# Patient Record
Sex: FEMALE | Race: BLACK OR AFRICAN AMERICAN | Employment: OTHER | URBAN - METROPOLITAN AREA
[De-identification: names, ages, dates, MRNs, and addresses within clinical notes are randomized per-mention and may not be internally consistent; named-entity substitution may affect disease eponyms.]

---

## 2017-01-19 ENCOUNTER — OFFICE VISIT (OUTPATIENT)
Dept: INTERNAL MEDICINE CLINIC | Facility: CLINIC | Age: 52
End: 2017-01-19

## 2017-01-19 VITALS
DIASTOLIC BLOOD PRESSURE: 78 MMHG | HEART RATE: 84 BPM | HEIGHT: 58 IN | BODY MASS INDEX: 45.13 KG/M2 | SYSTOLIC BLOOD PRESSURE: 122 MMHG | WEIGHT: 215 LBS | RESPIRATION RATE: 16 BRPM

## 2017-01-19 DIAGNOSIS — I27.20 MILD PULMONARY HYPERTENSION (HCC): ICD-10-CM

## 2017-01-19 DIAGNOSIS — Z51.81 ENCOUNTER FOR THERAPEUTIC DRUG MONITORING: Primary | ICD-10-CM

## 2017-01-19 DIAGNOSIS — E88.81 INSULIN RESISTANCE: ICD-10-CM

## 2017-01-19 DIAGNOSIS — E66.01 OBESITY, CLASS III, BMI 40-49.9 (MORBID OBESITY) (HCC): ICD-10-CM

## 2017-01-19 PROBLEM — E55.9 VITAMIN D DEFICIENCY: Status: ACTIVE | Noted: 2017-01-19

## 2017-01-19 PROCEDURE — 99213 OFFICE O/P EST LOW 20 MIN: CPT | Performed by: INTERNAL MEDICINE

## 2017-01-19 RX ORDER — LACTOBACIL 2/BIFIDO 1/S.THERMO 450B CELL
PACKET (EA) ORAL
COMMUNITY

## 2017-01-19 RX ORDER — TOPIRAMATE 25 MG/1
25 TABLET ORAL 2 TIMES DAILY
Qty: 60 TABLET | Refills: 5 | Status: SHIPPED | OUTPATIENT
Start: 2017-01-19 | End: 2017-02-23

## 2017-01-19 NOTE — PROGRESS NOTES
CC: Patient presents with:  Weight Check: up 2 lb       HPI:   Obesity, been trying to watch her carbs, didn't see dietician. Stopped metformin due to nausea. Current Outpatient Prescriptions:  Probiotic Product (VSL#3) Oral Cap Take by mouth. III, BMI 40-49.9 (morbid obesity) ([facility] [address]     Encounter for therapeutic drug monitoring     Insulin resistance     Mild pulmonary hypertension (HCC)     Fatigue     Abnormal EKG        REVIEW OF SYSTEMS:   CARDIOVASCULAR: denies chest pain    EXAM:   /

## 2017-01-23 RX ORDER — TOPIRAMATE 25 MG/1
TABLET ORAL
Qty: 60 TABLET | Refills: 0 | OUTPATIENT
Start: 2017-01-23

## 2017-01-23 NOTE — TELEPHONE ENCOUNTER
Requesting topirimate  LOV: 1/19/17  RTC: 4 weeks  Last Labs: n/a  Filled: 1/19/17 #60 with 5 refills    Future Appointments  Date Time Provider Radha Quarles   1/26/2017 10:00 PM SCHEDULE BY DATE 81 Yisel Hicks   2/23/2017 2:45 PM Trish Morris

## 2017-01-26 ENCOUNTER — OFFICE VISIT (OUTPATIENT)
Dept: SLEEP CENTER | Facility: HOSPITAL | Age: 52
End: 2017-01-26
Attending: INTERNAL MEDICINE
Payer: MEDICARE

## 2017-01-26 PROCEDURE — 95810 POLYSOM 6/> YRS 4/> PARAM: CPT

## 2017-01-30 NOTE — PROCEDURES
[address] [address],[facility]       Accredited by the [facility] ([facility])    PATIENT'S NAME:        Missy Nguyễn  ATTENDING PHYSICIAN:   Annabelle Roberts M.D. REFERRING PHYSICIAN:   Jennifer Cruz M.D.   JENNIFER limited recording montage, no EEG abnormalities were detected. EKG: The EKG demonstrates sinus rhythm. IMPRESSION:  Sleep apnea is present; it is borderline. RECOMMENDATIONS:    1.    Clinical correlation is recommended to determine if symptoms ar

## 2017-01-31 ENCOUNTER — OFFICE VISIT (OUTPATIENT)
Dept: AUDIOLOGY | Facility: CLINIC | Age: 52
End: 2017-01-31

## 2017-01-31 ENCOUNTER — OFFICE VISIT (OUTPATIENT)
Dept: OTOLARYNGOLOGY | Facility: CLINIC | Age: 52
End: 2017-01-31

## 2017-01-31 VITALS
DIASTOLIC BLOOD PRESSURE: 68 MMHG | BODY MASS INDEX: 42.13 KG/M2 | HEIGHT: 59 IN | SYSTOLIC BLOOD PRESSURE: 102 MMHG | TEMPERATURE: 98 F | WEIGHT: 209 LBS

## 2017-01-31 DIAGNOSIS — H93.12 RINGING IN THE EAR, LEFT: Primary | ICD-10-CM

## 2017-01-31 DIAGNOSIS — H93.12 TINNITUS, LEFT: ICD-10-CM

## 2017-01-31 DIAGNOSIS — H92.02 OTALGIA, LEFT EAR: Primary | ICD-10-CM

## 2017-01-31 PROBLEM — H90.3 SENSORINEURAL HEARING LOSS, BILATERAL: Status: ACTIVE | Noted: 2017-01-31

## 2017-01-31 PROCEDURE — 92557 COMPREHENSIVE HEARING TEST: CPT | Performed by: AUDIOLOGIST

## 2017-01-31 PROCEDURE — 99214 OFFICE O/P EST MOD 30 MIN: CPT | Performed by: OTOLARYNGOLOGY

## 2017-01-31 PROCEDURE — 92567 TYMPANOMETRY: CPT | Performed by: AUDIOLOGIST

## 2017-01-31 PROCEDURE — G0463 HOSPITAL OUTPT CLINIC VISIT: HCPCS | Performed by: OTOLARYNGOLOGY

## 2017-01-31 RX ORDER — CLONAZEPAM 2 MG/1
TABLET ORAL
Refills: 0 | COMMUNITY
Start: 2016-12-15 | End: 2017-02-13

## 2017-01-31 RX ORDER — MELOXICAM 15 MG/1
15 TABLET ORAL DAILY
Qty: 30 TABLET | Refills: 3 | Status: SHIPPED | OUTPATIENT
Start: 2017-01-31 | End: 2018-05-09

## 2017-01-31 RX ORDER — MONTELUKAST SODIUM 10 MG/1
10 TABLET ORAL NIGHTLY
Qty: 30 TABLET | Refills: 3 | Status: SHIPPED | OUTPATIENT
Start: 2017-01-31 | End: 2017-05-15 | Stop reason: ALTCHOICE

## 2017-01-31 RX ORDER — LORATADINE 10 MG/1
10 TABLET ORAL DAILY
Qty: 30 TABLET | Refills: 3 | Status: SHIPPED | OUTPATIENT
Start: 2017-01-31 | End: 2018-05-09

## 2017-01-31 RX ORDER — FLUTICASONE PROPIONATE 50 MCG
1 SPRAY, SUSPENSION (ML) NASAL 2 TIMES DAILY
Qty: 1 BOTTLE | Refills: 3 | Status: SHIPPED | OUTPATIENT
Start: 2017-01-31 | End: 2018-05-09

## 2017-01-31 RX ORDER — FLUTICASONE PROPIONATE 50 MCG
SPRAY, SUSPENSION (ML) NASAL
Refills: 0 | OUTPATIENT
Start: 2017-01-31

## 2017-01-31 RX ORDER — PANTOPRAZOLE SODIUM 40 MG/1
TABLET, DELAYED RELEASE ORAL
Refills: 3 | COMMUNITY
Start: 2016-12-15

## 2017-01-31 NOTE — PROGRESS NOTES
Maliha Rosenbaum is a 46year old female.   Patient presents with:  Ringing In Ear: ringing in the left ear, left ear pain for 2 years  Nose Problem: nasal congestion, watery eyes, headache for 2 years      HISTORY OF PRESENT ILLNESS    She presents with a t [facility])      pre diabetic   • Dyspareunia    • Pap smear for cervical cancer screening 5/1/2014     wnl, neg hpv   • H/O mammogram 5/22/2014     normal           Past Surgical History    CHOLECYSTECTOMY  1997 [address] TM - Right: Normal, Left: Normal.   Skin Normal Inspection - Normal.        Lymph Detail Normal Submental. Submandibular. Anterior cervical. Posterior cervical. Supraclavicular. TMJ  Tender to palpation on the loop left.  Small amount of cerumen removed f PLAN    1. Ringing in the ear, left  We did review her previous audiogram with her audiogram performed today demonstrating no change in her hearing over 2 years. She continues to have the same left-sided hearing loss.  She does recall having a previous MRI

## 2017-01-31 NOTE — PROGRESS NOTES
AUDIOGRAM     Joss Wooten was referred for testing by Con Kemp due to left otalgia, pressure and tinnitus. 8/16/1965  [id number]    Pt noted occasional right tinnitus.     Otoscopic Inspection:  Right ear:  No cerumen  Left ear:  No cerumen

## 2017-02-02 ENCOUNTER — TELEPHONE (OUTPATIENT)
Dept: INTERNAL MEDICINE CLINIC | Facility: CLINIC | Age: 52
End: 2017-02-02

## 2017-02-03 NOTE — TELEPHONE ENCOUNTER
LM for patient to call back.  Dr. Timoteo Booth reviewed the sleep study and agreed it shows borderline sleep apnea and she should f/u with sleep specialist.

## 2017-02-13 PROBLEM — G47.33 OSA (OBSTRUCTIVE SLEEP APNEA): Status: ACTIVE | Noted: 2017-02-13

## 2017-02-13 PROBLEM — G47.00 INSOMNIA, UNSPECIFIED TYPE: Status: ACTIVE | Noted: 2017-02-13

## 2017-02-23 ENCOUNTER — OFFICE VISIT (OUTPATIENT)
Dept: INTERNAL MEDICINE CLINIC | Facility: CLINIC | Age: 52
End: 2017-02-23

## 2017-02-23 VITALS
SYSTOLIC BLOOD PRESSURE: 124 MMHG | WEIGHT: 215 LBS | DIASTOLIC BLOOD PRESSURE: 78 MMHG | RESPIRATION RATE: 16 BRPM | HEART RATE: 84 BPM | BODY MASS INDEX: 45.13 KG/M2 | HEIGHT: 58 IN

## 2017-02-23 DIAGNOSIS — E78.5 HYPERLIPIDEMIA, UNSPECIFIED HYPERLIPIDEMIA TYPE: ICD-10-CM

## 2017-02-23 DIAGNOSIS — G47.33 OSA (OBSTRUCTIVE SLEEP APNEA): ICD-10-CM

## 2017-02-23 DIAGNOSIS — E66.01 OBESITY, CLASS III, BMI 40-49.9 (MORBID OBESITY) (HCC): ICD-10-CM

## 2017-02-23 DIAGNOSIS — E55.9 VITAMIN D DEFICIENCY: ICD-10-CM

## 2017-02-23 DIAGNOSIS — E88.81 INSULIN RESISTANCE: ICD-10-CM

## 2017-02-23 DIAGNOSIS — Z51.81 ENCOUNTER FOR THERAPEUTIC DRUG MONITORING: Primary | ICD-10-CM

## 2017-02-23 PROCEDURE — 99214 OFFICE O/P EST MOD 30 MIN: CPT | Performed by: INTERNAL MEDICINE

## 2017-02-23 RX ORDER — TOPIRAMATE 50 MG/1
50 TABLET, FILM COATED ORAL 2 TIMES DAILY
Qty: 60 TABLET | Refills: 5 | Status: SHIPPED | OUTPATIENT
Start: 2017-02-23 | End: 2017-08-03

## 2017-02-23 NOTE — PROGRESS NOTES
CC: Patient presents with:  Weight Check: same weight       HPI:   1. Obesity, Class III, BMI 40-49.9 (morbid obesity) (HCC)/Insulin resistance/Mild pulmonary hypertension ([address] doing well on  topamax.     2. KYLE (obstructive sleep apnea), saw sleep Abnormal uterine bleeding 1/2015    • Anxiety state, unspecified    • Diabetes mellitus ([facility] [address]      pre diabetic   • Dyspareunia    • Pap smear for cervical cancer screening 5/1/2014     wnl, neg hpv   • H/O mammogram 5/22/2014     normal   • Obstructive sl daily.          None     ASSESSMENT:   Encounter for therapeutic drug monitoring  (primary encounter diagnosis)  Obesity, Class III, BMI 40-49.9 (morbid obesity) (HCC)  Insulin resistance  KYLE (obstructive sleep apnea)  Vitamin D deficiency  Hyperlipidemia

## 2017-03-03 ENCOUNTER — TELEPHONE (OUTPATIENT)
Dept: INTERNAL MEDICINE CLINIC | Facility: CLINIC | Age: 52
End: 2017-03-03

## 2017-03-03 NOTE — TELEPHONE ENCOUNTER
Prior authorization started on [facility] for farxiga.  Awaiting response

## 2017-03-13 ENCOUNTER — TELEPHONE (OUTPATIENT)
Dept: INTERNAL MEDICINE CLINIC | Facility: CLINIC | Age: 52
End: 2017-03-13

## 2017-03-13 NOTE — TELEPHONE ENCOUNTER
[facility] and was informed that Topamax 50mg does not require Prior Auth, the patient just requested the refill too soon, she is due for a refill on 03/23/17. Pharmacy states that they will contact the patient to inform her of this.      Pl

## 2017-03-13 NOTE — TELEPHONE ENCOUNTER
Tegan Hall MA at 3/10/2017 11:49 AM      Status: Signed : Tegan Hall MA (Medical Assistant)     Expand All Collapse All    Prior auth for Azucena Gee was denied by insurance. Patient needs to try and fail invokana, jardiance. Recommendations?

## 2017-03-13 NOTE — TELEPHONE ENCOUNTER
Informed pt the    Expand All Collapse All    Prior auth for farxiga was denied by insurance. Patient needs to try and fail invokana, jardiance. Recommendations? Please advise.

## 2017-03-20 NOTE — TELEPHONE ENCOUNTER
Patient returned call, informed of below, patient aware and verbalized understanding with intent to comply.

## 2017-03-21 ENCOUNTER — TELEPHONE (OUTPATIENT)
Dept: INTERNAL MEDICINE CLINIC | Facility: CLINIC | Age: 52
End: 2017-03-21

## 2017-03-29 ENCOUNTER — LAB ENCOUNTER (OUTPATIENT)
Dept: LAB | Facility: HOSPITAL | Age: 52
End: 2017-03-29
Attending: INTERNAL MEDICINE
Payer: MEDICARE

## 2017-03-29 DIAGNOSIS — E05.20 TOXIC MULTINODULAR GOITER: ICD-10-CM

## 2017-03-29 DIAGNOSIS — E05.90: ICD-10-CM

## 2017-03-29 DIAGNOSIS — E66.09 NON MORBID OBESITY DUE TO EXCESS CALORIES: Primary | ICD-10-CM

## 2017-03-29 LAB
ALBUMIN SERPL BCP-MCNC: 3.8 G/DL (ref 3.5–4.8)
ALBUMIN/GLOB SERPL: 1.2 {RATIO} (ref 1–2)
ALP SERPL-CCNC: 64 U/L (ref 32–100)
ALT SERPL-CCNC: 39 U/L (ref 14–54)
ANION GAP SERPL CALC-SCNC: 9 MMOL/L (ref 0–18)
AST SERPL-CCNC: 38 U/L (ref 15–41)
BILIRUB SERPL-MCNC: 0.5 MG/DL (ref 0.3–1.2)
BUN SERPL-MCNC: 8 MG/DL (ref 8–20)
BUN/CREAT SERPL: 13.6 (ref 10–20)
CALCIUM SERPL-MCNC: 9.4 MG/DL (ref 8.5–10.5)
CHLORIDE SERPL-SCNC: 101 MMOL/L (ref 95–110)
CO2 SERPL-SCNC: 29 MMOL/L (ref 22–32)
CREAT SERPL-MCNC: 0.59 MG/DL (ref 0.5–1.5)
GLOBULIN PLAS-MCNC: 3.3 G/DL (ref 2.5–3.7)
GLUCOSE SERPL-MCNC: 107 MG/DL (ref 70–99)
OSMOLALITY UR CALC.SUM OF ELEC: 287 MOSM/KG (ref 275–295)
POTASSIUM SERPL-SCNC: 3.8 MMOL/L (ref 3.3–5.1)
PROT SERPL-MCNC: 7.1 G/DL (ref 5.9–8.4)
SODIUM SERPL-SCNC: 139 MMOL/L (ref 136–144)
T3FREE SERPL-MCNC: 2.73 PG/ML (ref 2.53–4.29)
T4 FREE SERPL-MCNC: 0.72 NG/DL (ref 0.58–1.64)
TSH SERPL-ACNC: 0.67 UIU/ML (ref 0.34–5.6)

## 2017-03-29 PROCEDURE — 84443 ASSAY THYROID STIM HORMONE: CPT

## 2017-03-29 PROCEDURE — 84439 ASSAY OF FREE THYROXINE: CPT

## 2017-03-29 PROCEDURE — 84481 FREE ASSAY (FT-3): CPT

## 2017-03-29 PROCEDURE — 80053 COMPREHEN METABOLIC PANEL: CPT

## 2017-03-29 PROCEDURE — 36415 COLL VENOUS BLD VENIPUNCTURE: CPT

## 2017-04-05 ENCOUNTER — HOSPITAL ENCOUNTER (OUTPATIENT)
Dept: ULTRASOUND IMAGING | Age: 52
Discharge: HOME | End: 2017-04-05
Attending: INTERNAL MEDICINE
Payer: MEDICARE

## 2017-04-05 ENCOUNTER — APPOINTMENT (OUTPATIENT)
Dept: LAB | Age: 52
End: 2017-04-05
Attending: INTERNAL MEDICINE
Payer: MEDICARE

## 2017-04-05 DIAGNOSIS — E78.5 HYPERLIPIDEMIA, UNSPECIFIED HYPERLIPIDEMIA TYPE: ICD-10-CM

## 2017-04-05 DIAGNOSIS — Z51.81 ENCOUNTER FOR THERAPEUTIC DRUG MONITORING: ICD-10-CM

## 2017-04-05 DIAGNOSIS — E05.90 HYPERTHYROIDISM: ICD-10-CM

## 2017-04-05 DIAGNOSIS — E55.9 VITAMIN D DEFICIENCY: ICD-10-CM

## 2017-04-05 DIAGNOSIS — G47.33 OSA (OBSTRUCTIVE SLEEP APNEA): ICD-10-CM

## 2017-04-05 DIAGNOSIS — E88.81 INSULIN RESISTANCE: ICD-10-CM

## 2017-04-05 DIAGNOSIS — E66.01 OBESITY, CLASS III, BMI 40-49.9 (MORBID OBESITY) (HCC): ICD-10-CM

## 2017-04-05 PROCEDURE — 76536 US EXAM OF HEAD AND NECK: CPT

## 2017-04-05 PROCEDURE — 80061 LIPID PANEL: CPT

## 2017-04-05 PROCEDURE — 36415 COLL VENOUS BLD VENIPUNCTURE: CPT

## 2017-04-25 ENCOUNTER — OFFICE VISIT (OUTPATIENT)
Dept: INTERNAL MEDICINE CLINIC | Facility: CLINIC | Age: 52
End: 2017-04-25

## 2017-04-25 VITALS
RESPIRATION RATE: 16 BRPM | HEART RATE: 84 BPM | SYSTOLIC BLOOD PRESSURE: 124 MMHG | WEIGHT: 205 LBS | HEIGHT: 58 IN | BODY MASS INDEX: 43.03 KG/M2 | DIASTOLIC BLOOD PRESSURE: 80 MMHG

## 2017-04-25 DIAGNOSIS — E66.01 OBESITY, CLASS III, BMI 40-49.9 (MORBID OBESITY) (HCC): ICD-10-CM

## 2017-04-25 DIAGNOSIS — R73.01 IMPAIRED FASTING GLUCOSE: ICD-10-CM

## 2017-04-25 DIAGNOSIS — Z51.81 ENCOUNTER FOR THERAPEUTIC DRUG MONITORING: Primary | ICD-10-CM

## 2017-04-25 PROCEDURE — 99213 OFFICE O/P EST LOW 20 MIN: CPT | Performed by: INTERNAL MEDICINE

## 2017-04-25 NOTE — PROGRESS NOTES
CC: Patient presents with:  Weight Check: down 10 lb       HPI:   Obesity, doing well on topamax. No chest pain.        Current Outpatient Prescriptions:  Naltrexone-Bupropion HCl ER (CONTRAVE) 8-90 MG Oral Tablet 12 Hr Take 2 tablets by mouth 2 (two) t Patient Active Problem List:     Unspecified gastritis and gastroduodenitis without mention of hemorrhage     Migraine without aura, without mention of intractable migraine without mention of status migrainosus     Chest pain, unspecified     Impaired consider low dose phentermine in the future if needed.  Farxiga/jardiance not covered. Will start contrave, Pt consulted that I am a speaker for the drug and I speak to other doctors about it.    2. KYLE (obstructive sleep apnea), saw sleep doc, trying to u

## 2017-05-16 ENCOUNTER — HOSPITAL ENCOUNTER (OUTPATIENT)
Dept: ULTRASOUND IMAGING | Facility: HOSPITAL | Age: 52
Discharge: HOME | End: 2017-05-16
Attending: INTERNAL MEDICINE
Payer: COMMERCIAL

## 2017-05-22 ENCOUNTER — TELEPHONE (OUTPATIENT)
Dept: INTERNAL MEDICINE CLINIC | Facility: CLINIC | Age: 52
End: 2017-05-22

## 2017-05-23 ENCOUNTER — HOSPITAL ENCOUNTER (OUTPATIENT)
Dept: ULTRASOUND IMAGING | Facility: HOSPITAL | Age: 52
Discharge: HOME | End: 2017-05-23
Attending: INTERNAL MEDICINE
Payer: MEDICARE

## 2017-05-23 VITALS
HEART RATE: 82 BPM | OXYGEN SATURATION: 99 % | RESPIRATION RATE: 16 BRPM | DIASTOLIC BLOOD PRESSURE: 73 MMHG | SYSTOLIC BLOOD PRESSURE: 130 MMHG

## 2017-05-23 DIAGNOSIS — E05.20 TOXIC MULTINODUL GOITER: ICD-10-CM

## 2017-05-23 PROCEDURE — 88177 CYTP FNA EVAL EA ADDL: CPT | Performed by: INTERNAL MEDICINE

## 2017-05-23 PROCEDURE — 88172 CYTP DX EVAL FNA 1ST EA SITE: CPT | Performed by: INTERNAL MEDICINE

## 2017-05-23 PROCEDURE — 10022 US FNA THYROID SH(CPT=10022/76942): CPT | Performed by: INTERNAL MEDICINE

## 2017-05-23 PROCEDURE — 76942 ECHO GUIDE FOR BIOPSY: CPT | Performed by: INTERNAL MEDICINE

## 2017-05-23 PROCEDURE — 88173 CYTOPATH EVAL FNA REPORT: CPT | Performed by: INTERNAL MEDICINE

## 2017-05-23 NOTE — IMAGING NOTE
PT ARRIVED TO ROOM 3   SCANS BY Scottie Line TECH    HX TAKEN:   DM TYPE 2, MIGRAINES, MILD HEARING LOSS, RIGHT THYROID NODULE, ANXIETY,GOITER, PMILD PUMONARY HTN,HYPERLIPIDEMIA      PROCEDURE EXPLAINED QUESTIONS ANSWERED    PT CONSENTED AT 1055

## 2017-06-20 ENCOUNTER — OFFICE VISIT (OUTPATIENT)
Dept: INTERNAL MEDICINE CLINIC | Facility: CLINIC | Age: 52
End: 2017-06-20

## 2017-06-20 VITALS
WEIGHT: 210 LBS | BODY MASS INDEX: 44.08 KG/M2 | HEART RATE: 84 BPM | SYSTOLIC BLOOD PRESSURE: 124 MMHG | DIASTOLIC BLOOD PRESSURE: 80 MMHG | HEIGHT: 58 IN | RESPIRATION RATE: 16 BRPM

## 2017-06-20 DIAGNOSIS — E88.81 INSULIN RESISTANCE: ICD-10-CM

## 2017-06-20 DIAGNOSIS — Z51.81 ENCOUNTER FOR THERAPEUTIC DRUG MONITORING: Primary | ICD-10-CM

## 2017-06-20 DIAGNOSIS — E66.01 OBESITY, CLASS III, BMI 40-49.9 (MORBID OBESITY) (HCC): ICD-10-CM

## 2017-06-20 DIAGNOSIS — G47.33 OSA (OBSTRUCTIVE SLEEP APNEA): ICD-10-CM

## 2017-06-20 PROCEDURE — 99213 OFFICE O/P EST LOW 20 MIN: CPT | Performed by: INTERNAL MEDICINE

## 2017-06-20 RX ORDER — METFORMIN HYDROCHLORIDE 750 MG/1
750 TABLET, EXTENDED RELEASE ORAL
Qty: 30 TABLET | Refills: 5 | Status: SHIPPED | OUTPATIENT
Start: 2017-06-20 | End: 2017-08-03

## 2017-06-20 NOTE — PROGRESS NOTES
CC: Patient presents with:  Weight Check: up 5 lb       HPI:   Obesity, doing well on topamax, increased stress, forgets to take it twice daily.        Current Outpatient Prescriptions:  MetFORMIN HCl  MG Oral Tablet 24 Hr Take 1 tablet (750 mg to apnea (adult) (pediatric) Edward PSG 1-26-17     AHI 5.3 SaO2 cole 81 % autoPAP 6-16 HME      Patient Active Problem List:     Unspecified gastritis and gastroduodenitis without mention of hemorrhage     Migraine without aura, without mention of intractab topamax. Stopped metformin due to nausea, willing to try again, will give metformin XR daily. Labs show prediabetes and high leptin. On topamax 50mg bid, forgets to take bid, will change to trokendi 100mg XR daily.  Echo back in 2015 showed mild pulm HTN, r

## 2017-07-18 ENCOUNTER — TELEPHONE (OUTPATIENT)
Dept: INTERNAL MEDICINE CLINIC | Facility: CLINIC | Age: 52
End: 2017-07-18

## 2017-07-18 NOTE — TELEPHONE ENCOUNTER
Name of Medication:  Ergocalciferol (Cap) DRISDOL/VITAMIN D2 31520 units Oral Take 1 capsule (50,000 Units total) by mouth once a week. For 6 months         Dose:     How is medication prescribed:    Specific name of pharmacy and location:    Name of ma

## 2017-07-19 NOTE — TELEPHONE ENCOUNTER
Requesting Ergocalciferol  LOV: 6/20/17  RTC: 4 weeks  Last Labs: 11/2016  For 6 months  Filled: 11/30/16 #24 with 0 refills    Future Appointments  Date Time Provider Radha Quarles   8/22/2017 2:00 PM Ace Bedoya MD [facility]   9/18/2017 2

## 2017-07-20 RX ORDER — ERGOCALCIFEROL 1.25 MG/1
CAPSULE ORAL
Qty: 24 CAPSULE | Refills: 0 | OUTPATIENT
Start: 2017-07-20

## 2017-08-03 ENCOUNTER — OFFICE VISIT (OUTPATIENT)
Dept: INTERNAL MEDICINE CLINIC | Facility: CLINIC | Age: 52
End: 2017-08-03

## 2017-08-03 VITALS
HEART RATE: 84 BPM | SYSTOLIC BLOOD PRESSURE: 122 MMHG | DIASTOLIC BLOOD PRESSURE: 78 MMHG | WEIGHT: 202 LBS | RESPIRATION RATE: 16 BRPM | HEIGHT: 58 IN | BODY MASS INDEX: 42.4 KG/M2

## 2017-08-03 DIAGNOSIS — G47.33 OSA (OBSTRUCTIVE SLEEP APNEA): ICD-10-CM

## 2017-08-03 DIAGNOSIS — E88.81 INSULIN RESISTANCE: ICD-10-CM

## 2017-08-03 DIAGNOSIS — E66.01 OBESITY, CLASS III, BMI 40-49.9 (MORBID OBESITY) (HCC): ICD-10-CM

## 2017-08-03 DIAGNOSIS — Z51.81 ENCOUNTER FOR THERAPEUTIC DRUG MONITORING: Primary | ICD-10-CM

## 2017-08-03 PROCEDURE — 99213 OFFICE O/P EST LOW 20 MIN: CPT | Performed by: INTERNAL MEDICINE

## 2017-08-03 RX ORDER — PHENTERMINE HYDROCHLORIDE 15 MG/1
15 CAPSULE ORAL EVERY MORNING
Qty: 30 CAPSULE | Refills: 0 | Status: SHIPPED | OUTPATIENT
Start: 2017-08-03 | End: 2018-05-09

## 2017-08-03 NOTE — PROGRESS NOTES
CC: Patient presents with:  Weight Check: down 8 lb       HPI:   Obesity, metformin and trokendi caused dizziness, she stopped. No chest pain.        Current Outpatient Prescriptions:  Phentermine HCl 15 MG Oral Cap Take 1 capsule (15 mg total) by rodrigo Mild hearing loss of left ear     Mild hearing loss of right ear     Hypercholesteremia     Right thyroid nodule     Diabetes type 2, controlled (HCC)     Anxiety about health     Goiter, euthyroid     Positive ABEBE (antinuclear antibody)     Obesity, Class plan.  Return in about 4 weeks (around 8/31/2017).

## 2017-09-20 ENCOUNTER — TELEPHONE (OUTPATIENT)
Dept: FAMILY MEDICINE CLINIC | Facility: CLINIC | Age: 52
End: 2017-09-20

## 2017-09-20 NOTE — TELEPHONE ENCOUNTER
Spoke to pt and per pt she will cb and schedule the AWV, this is not a good time for pt to schedule one. Pt will cb.

## 2017-10-25 ENCOUNTER — TELEPHONE (OUTPATIENT)
Dept: FAMILY MEDICINE CLINIC | Facility: CLINIC | Age: 52
End: 2017-10-25

## 2017-11-20 ENCOUNTER — TELEPHONE (OUTPATIENT)
Dept: FAMILY MEDICINE CLINIC | Facility: CLINIC | Age: 52
End: 2017-11-20

## 2017-12-17 ENCOUNTER — HOSPITAL ENCOUNTER (OUTPATIENT)
Age: 52
Discharge: HOME | End: 2017-12-17
Payer: MEDICARE

## 2017-12-17 VITALS
HEART RATE: 80 BPM | SYSTOLIC BLOOD PRESSURE: 126 MMHG | TEMPERATURE: 98 F | OXYGEN SATURATION: 99 % | DIASTOLIC BLOOD PRESSURE: 75 MMHG | RESPIRATION RATE: 20 BRPM

## 2017-12-17 DIAGNOSIS — J06.9 VIRAL UPPER RESPIRATORY TRACT INFECTION WITH COUGH: Primary | ICD-10-CM

## 2017-12-17 DIAGNOSIS — J02.9 VIRAL PHARYNGITIS: ICD-10-CM

## 2017-12-17 PROCEDURE — 87081 CULTURE SCREEN ONLY: CPT | Performed by: NURSE PRACTITIONER

## 2017-12-17 PROCEDURE — 99204 OFFICE O/P NEW MOD 45 MIN: CPT

## 2017-12-17 PROCEDURE — 99214 OFFICE O/P EST MOD 30 MIN: CPT

## 2017-12-17 PROCEDURE — 87430 STREP A AG IA: CPT | Performed by: NURSE PRACTITIONER

## 2017-12-17 RX ORDER — FLUTICASONE PROPIONATE 50 MCG
1 SPRAY, SUSPENSION (ML) NASAL 2 TIMES DAILY PRN
Qty: 16 G | Refills: 0 | Status: SHIPPED | OUTPATIENT
Start: 2017-12-17 | End: 2017-12-17

## 2017-12-17 RX ORDER — METHYLPREDNISOLONE 4 MG/1
TABLET ORAL
Qty: 1 PACKAGE | Refills: 0 | Status: SHIPPED | OUTPATIENT
Start: 2017-12-17 | End: 2018-05-09

## 2017-12-17 NOTE — ED PROVIDER NOTES
Patient Seen in: [address] [facility]    History   Patient presents with:  Cough/URI  Sore Throat    Stated Complaint: cold,sore throat,nasal congestion    30-year-old female presents today with complaints of URI symptoms sore throat and a dry throat,nasal congestion  Other systems are as noted in HPI. Constitutional and vital signs reviewed. All other systems reviewed and negative except as noted above.     Physical Exam   ED Triage Vitals [12/17/17 1044]  BP: 126/75  Pulse: 80  Resp: 20 diagnosis)  Viral pharyngitis    Disposition:  Discharge  12/17/2017 11:08 am    Follow-up:  Jessica Valderrama MD  [facility] [address] [address]  [phone]    In 1 week  As needed        Medications Prescribed:  Current Discharge Medication L

## 2018-03-16 ENCOUNTER — PATIENT OUTREACH (OUTPATIENT)
Dept: FAMILY MEDICINE CLINIC | Facility: CLINIC | Age: 53
End: 2018-03-16

## 2018-03-16 NOTE — PROGRESS NOTES
Called and left patient a message regarding AWV. Also asked her to let us know is Veda Diane is still her primary.   Looks like she goes to another [facility] office  Dr. Nissa Israel

## 2018-04-13 ENCOUNTER — PATIENT OUTREACH (OUTPATIENT)
Dept: FAMILY MEDICINE CLINIC | Facility: CLINIC | Age: 53
End: 2018-04-13

## 2018-05-01 ENCOUNTER — PATIENT OUTREACH (OUTPATIENT)
Dept: FAMILY MEDICINE CLINIC | Facility: CLINIC | Age: 53
End: 2018-05-01

## 2018-05-09 ENCOUNTER — OFFICE VISIT (OUTPATIENT)
Dept: INTERNAL MEDICINE CLINIC | Facility: CLINIC | Age: 53
End: 2018-05-09

## 2018-05-09 VITALS
BODY MASS INDEX: 42.17 KG/M2 | RESPIRATION RATE: 20 BRPM | HEART RATE: 76 BPM | WEIGHT: 200.88 LBS | HEIGHT: 58 IN | DIASTOLIC BLOOD PRESSURE: 80 MMHG | SYSTOLIC BLOOD PRESSURE: 124 MMHG

## 2018-05-09 DIAGNOSIS — E78.5 HYPERLIPIDEMIA, UNSPECIFIED HYPERLIPIDEMIA TYPE: ICD-10-CM

## 2018-05-09 DIAGNOSIS — G47.33 OSA (OBSTRUCTIVE SLEEP APNEA): ICD-10-CM

## 2018-05-09 DIAGNOSIS — E66.01 OBESITY, CLASS III, BMI 40-49.9 (MORBID OBESITY) (HCC): ICD-10-CM

## 2018-05-09 DIAGNOSIS — Z51.81 ENCOUNTER FOR THERAPEUTIC DRUG MONITORING: Primary | ICD-10-CM

## 2018-05-09 PROCEDURE — 99214 OFFICE O/P EST MOD 30 MIN: CPT | Performed by: INTERNAL MEDICINE

## 2018-05-09 NOTE — PROGRESS NOTES
HISTORY OF PRESENT ILLNESS  Patient presents with:  Weight Problem: Previous patient of Moses Alvarado is a 46year old female here for follow up in medical weight loss program.     Last saw Dr. Edie Guzman in 7/17.    Did lose and then gained the w 03/29/2017   GFRAA >60 03/29/2017   CA 9.4 03/29/2017   OSMOCALC 287 03/29/2017   ALKPHO 64 03/29/2017   AST 38 03/29/2017   ALT 39 03/29/2017   BILT 0.5 03/29/2017   TP 7.1 03/29/2017   ALB 3.8 03/29/2017   GLOBULIN 3.3 03/29/2017    03/29/2017   K HEMOGLOBIN A1C; Future  -     LIPID PANEL;  Future  -     TSH+FREE T4; Future  -     VITAMIN B12; Future  -     VITAMIN D, 25-HYDROXY; Future  -     OP REFERRAL TO DIETITIAN EMG WLC (WLC USE ONLY)    Hyperlipidemia, unspecified hyperlipidemia type  -     CO soda/juice consumption if soda/juice drinker  2. Eat breakfast daily (within 1 hour) 20 gram of protein   3. Focus on protein: (15-30 grams with each meal) ie. greek yogurt, cottage cheese, string cheese, hard boiled eggs  4.  Healthy snacks: peanut butter

## 2018-05-09 NOTE — PATIENT INSTRUCTIONS
Plan:  Continue with medications:   Go to the lab for blood work   Follow up with me in 1 month  Schedule follow up appointments: Melissa Mallory (dietitian) & Valeri Wilson (behavorial psychologist)   Adrian Leary with Lita Bassett    Please try to work on the following [facility]

## 2018-05-10 ENCOUNTER — TELEPHONE (OUTPATIENT)
Dept: INTERNAL MEDICINE CLINIC | Facility: CLINIC | Age: 53
End: 2018-05-10

## 2018-05-10 NOTE — TELEPHONE ENCOUNTER
I can send in a prescription for the pen needles. Did you want patient to stay on the 0.6mg dose of victoza until her f/u visit with you on 6/7/18 or should she titrate up as tolerated?

## 2018-05-10 NOTE — TELEPHONE ENCOUNTER
Patient was seen yesterday by Dr. Yo Shafer and was prescribed Victoza and the needles were never sent over to the pharmacy.  Patient also stated that the prescription was sent over for 90 days and needs to be changed to a 30 day supply in order for it to be  Co

## 2018-05-29 ENCOUNTER — APPOINTMENT (OUTPATIENT)
Dept: LAB | Age: 53
End: 2018-05-29
Attending: INTERNAL MEDICINE
Payer: MEDICARE

## 2018-05-29 DIAGNOSIS — E78.5 HYPERLIPIDEMIA, UNSPECIFIED HYPERLIPIDEMIA TYPE: ICD-10-CM

## 2018-05-29 DIAGNOSIS — E66.01 OBESITY, CLASS III, BMI 40-49.9 (MORBID OBESITY) (HCC): ICD-10-CM

## 2018-05-29 DIAGNOSIS — G47.33 OSA (OBSTRUCTIVE SLEEP APNEA): ICD-10-CM

## 2018-05-29 PROCEDURE — 36415 COLL VENOUS BLD VENIPUNCTURE: CPT

## 2018-05-29 PROCEDURE — 84443 ASSAY THYROID STIM HORMONE: CPT

## 2018-05-29 PROCEDURE — 80061 LIPID PANEL: CPT

## 2018-05-29 PROCEDURE — 82607 VITAMIN B-12: CPT

## 2018-05-29 PROCEDURE — 82306 VITAMIN D 25 HYDROXY: CPT

## 2018-05-29 PROCEDURE — 80053 COMPREHEN METABOLIC PANEL: CPT

## 2018-05-29 PROCEDURE — 83036 HEMOGLOBIN GLYCOSYLATED A1C: CPT

## 2018-05-29 PROCEDURE — 84439 ASSAY OF FREE THYROXINE: CPT

## 2018-05-29 PROCEDURE — 85025 COMPLETE CBC W/AUTO DIFF WBC: CPT

## 2018-06-07 ENCOUNTER — OFFICE VISIT (OUTPATIENT)
Dept: INTERNAL MEDICINE CLINIC | Facility: CLINIC | Age: 53
End: 2018-06-07

## 2018-06-07 ENCOUNTER — TELEPHONE (OUTPATIENT)
Dept: INTERNAL MEDICINE CLINIC | Facility: CLINIC | Age: 53
End: 2018-06-07

## 2018-06-07 VITALS
BODY MASS INDEX: 43.03 KG/M2 | SYSTOLIC BLOOD PRESSURE: 122 MMHG | HEART RATE: 84 BPM | RESPIRATION RATE: 16 BRPM | WEIGHT: 205 LBS | HEIGHT: 58 IN | DIASTOLIC BLOOD PRESSURE: 80 MMHG

## 2018-06-07 DIAGNOSIS — E88.81 INSULIN RESISTANCE: ICD-10-CM

## 2018-06-07 DIAGNOSIS — Z51.81 ENCOUNTER FOR THERAPEUTIC DRUG MONITORING: Primary | ICD-10-CM

## 2018-06-07 DIAGNOSIS — E55.9 VITAMIN D DEFICIENCY: ICD-10-CM

## 2018-06-07 DIAGNOSIS — E66.01 OBESITY, CLASS III, BMI 40-49.9 (MORBID OBESITY) (HCC): ICD-10-CM

## 2018-06-07 DIAGNOSIS — E78.00 PURE HYPERCHOLESTEROLEMIA: ICD-10-CM

## 2018-06-07 DIAGNOSIS — E11.9 CONTROLLED TYPE 2 DIABETES MELLITUS WITHOUT COMPLICATION, WITHOUT LONG-TERM CURRENT USE OF INSULIN (HCC): ICD-10-CM

## 2018-06-07 PROCEDURE — 99214 OFFICE O/P EST MOD 30 MIN: CPT | Performed by: INTERNAL MEDICINE

## 2018-06-07 RX ORDER — METFORMIN HYDROCHLORIDE 750 MG/1
750 TABLET, EXTENDED RELEASE ORAL 2 TIMES DAILY WITH MEALS
Qty: 60 TABLET | Refills: 2 | Status: SHIPPED | OUTPATIENT
Start: 2018-06-07 | End: 2018-08-07

## 2018-06-07 RX ORDER — ERGOCALCIFEROL 1.25 MG/1
50000 CAPSULE ORAL WEEKLY
Qty: 4 CAPSULE | Refills: 2 | Status: SHIPPED | OUTPATIENT
Start: 2018-06-07 | End: 2018-07-07

## 2018-06-07 NOTE — PROGRESS NOTES
HISTORY OF PRESENT ILLNESS  Patient presents with:  Weight Check: up 5 lb      Charity Varela is a 46year old female here for follow up in medical weight loss program.     Has been eating cottage cheese and grape fruit.    Has been walking 1-2/ 3 days of  05/29/2018   CO2 29.0 05/29/2018       Lab Results  Component Value Date    (H) 05/29/2018   A1C 6.2 (H) 05/29/2018       Lab Results  Component Value Date   CHOLEST 216 (H) 05/29/2018   TRIG 88 05/29/2018   HDL 64 05/29/2018    (H) side effects and adverse effects of this medication  · Worsening cholesterol, advised needs to continue to change eating and work on weight loss   · Continue victoza 1.8 mg q day   · Needs to start tracking her daily calorie intake.    · Nutrition: low carb

## 2018-06-07 NOTE — TELEPHONE ENCOUNTER
Aetammy called from the prior authorization dept   Would like to discuss/request additional clinical information on rx jardiance also would like to know names of meds pt has tried before       # [phone]

## 2018-06-07 NOTE — TELEPHONE ENCOUNTER
Insurance is not covering Vit D 50,000 Units Dr Nayeli Alvarez recommends to take Vit D OTC 5,000 Units daily    Insurance is not covering Etoformin ER Dr Nayeli Alvarez wants to change to metformin 500 mg BID, free at [place].

## 2018-06-07 NOTE — TELEPHONE ENCOUNTER
Cash price for Vitamin D at [employer] is $11.  Metformin about $30 and Victoza about $40.

## 2018-06-08 NOTE — TELEPHONE ENCOUNTER
Spoke with patient who states that jardiance is the medication that Dr. Sandhya Agudelo really wants patient to be on but does think that her insurance will cover it so metformin was prescribed instead.     Patient called her insurance to have them fax us paperwork to

## 2018-06-08 NOTE — TELEPHONE ENCOUNTER
Spoke with patient and informed her of the cash prices of the medications that Brenda De Leon called on. Patient states that she will go to [place] and have it figured out. I advised her to call us back if there were any issues.

## 2018-06-12 ENCOUNTER — TELEPHONE (OUTPATIENT)
Dept: INTERNAL MEDICINE CLINIC | Facility: CLINIC | Age: 53
End: 2018-06-12

## 2018-06-12 NOTE — TELEPHONE ENCOUNTER
MD Konrad Linda, RN   18 minutes ago (1:17 PM)      Lets take metformin at dinner every other day until she can tolerate it  (Routing comment)      Patient aware of Dr. Olamide Thompson recommendations. Voiced understanding and agreement.

## 2018-06-12 NOTE — TELEPHONE ENCOUNTER
Miranda:pt called to speak to a nurse states has been feeling extremely sluggis,no energy, tired, struggles to get out of bed and is currently on metformin and Minnie Boyle wondering if those are symptoms and how long will she be that way or does it take time to g

## 2018-06-12 NOTE — TELEPHONE ENCOUNTER
[facility] fax from [place] that [place] medication will be covered from 12/30/17-12/31/18. Referral #  [id number]  Member ID# [id number]  Customer Service # [phone]. Please advise if you would like to change patient from metformin to Jardiance.  Pérez

## 2018-06-12 NOTE — TELEPHONE ENCOUNTER
Paperwork signed by Provider and faxed back to Mary Ann at number below. Confirmation rcvd. Sent to scan and copy in blue bin.

## 2018-06-12 NOTE — TELEPHONE ENCOUNTER
Has been taking metformin ER 750mg 1QAM for 5 days and has been feeling very tired and sluggish since starting it. Her insurance did approve jardiance if you wanted to change her to that    She is also on victoza 1.8mg for 3 weeks now.       LOV: 6/7/18

## 2018-06-21 ENCOUNTER — CHARTING TRANS (OUTPATIENT)
Dept: OTHER | Age: 53
End: 2018-06-21

## 2018-07-23 ENCOUNTER — CHARTING TRANS (OUTPATIENT)
Dept: OTHER | Age: 53
End: 2018-07-23

## 2018-08-07 ENCOUNTER — OFFICE VISIT (OUTPATIENT)
Dept: INTERNAL MEDICINE CLINIC | Facility: CLINIC | Age: 53
End: 2018-08-07
Payer: COMMERCIAL

## 2018-08-07 VITALS
HEIGHT: 58 IN | SYSTOLIC BLOOD PRESSURE: 124 MMHG | RESPIRATION RATE: 16 BRPM | HEART RATE: 76 BPM | BODY MASS INDEX: 43.24 KG/M2 | DIASTOLIC BLOOD PRESSURE: 80 MMHG | WEIGHT: 206 LBS

## 2018-08-07 DIAGNOSIS — I27.20 MILD PULMONARY HYPERTENSION (HCC): ICD-10-CM

## 2018-08-07 DIAGNOSIS — E66.01 OBESITY, CLASS III, BMI 40-49.9 (MORBID OBESITY) (HCC): ICD-10-CM

## 2018-08-07 DIAGNOSIS — Z51.81 ENCOUNTER FOR THERAPEUTIC DRUG MONITORING: Primary | ICD-10-CM

## 2018-08-07 PROCEDURE — 99214 OFFICE O/P EST MOD 30 MIN: CPT | Performed by: INTERNAL MEDICINE

## 2018-08-07 RX ORDER — LORAZEPAM 2 MG/1
TABLET ORAL
COMMUNITY
End: 2018-09-11

## 2018-08-07 RX ORDER — HYDROCHLOROTHIAZIDE 25 MG/1
TABLET ORAL
Refills: 2 | COMMUNITY
Start: 2018-06-16 | End: 2018-09-11

## 2018-08-07 RX ORDER — ERGOCALCIFEROL 1.25 MG/1
CAPSULE ORAL
Refills: 2 | COMMUNITY
Start: 2018-07-10 | End: 2018-10-11

## 2018-08-07 RX ORDER — CLONAZEPAM 1 MG/1
1 TABLET ORAL
COMMUNITY
Start: 2018-07-10 | End: 2018-08-07

## 2018-08-07 RX ORDER — TRIAMTERENE AND HYDROCHLOROTHIAZIDE 37.5; 25 MG/1; MG/1
1 TABLET ORAL DAILY
COMMUNITY
Start: 2018-05-15

## 2018-08-07 RX ORDER — MOMETASONE FUROATE 1 MG/G
CREAM TOPICAL
COMMUNITY
Start: 2018-07-23 | End: 2018-09-11

## 2018-08-08 NOTE — PROGRESS NOTES
HISTORY OF PRESENT ILLNESS  Patient presents with:  Weight Check: up 1 lb      Terese Urbina is a 46year old female here for follow up in medical weight loss program.     Up 1 lb from previous.    Did not tolerate medication with victoza/ metformin felt 05/29/2018    05/29/2018   CO2 29.0 05/29/2018       Lab Results  Component Value Date    (H) 05/29/2018   A1C 6.2 (H) 05/29/2018       Lab Results  Component Value Date   CHOLEST 216 (H) 05/29/2018   TRIG 88 05/29/2018   HDL 64 05/29/2018   L jardiance  · -advised of side effects and adverse effects of this medication  · We reviewed the limitations of medication   · Advised of side effects and adverse effects of this medication.    · Worsening cholesterol, advised needs to continue to change eat

## 2018-09-28 ENCOUNTER — APPOINTMENT (OUTPATIENT)
Dept: CT IMAGING | Age: 53
End: 2018-09-28
Attending: EMERGENCY MEDICINE
Payer: MEDICARE

## 2018-09-28 ENCOUNTER — HOSPITAL ENCOUNTER (EMERGENCY)
Age: 53
Discharge: HOME | End: 2018-09-28
Attending: EMERGENCY MEDICINE
Payer: MEDICARE

## 2018-09-28 VITALS
BODY MASS INDEX: 40.32 KG/M2 | WEIGHT: 200 LBS | HEART RATE: 75 BPM | DIASTOLIC BLOOD PRESSURE: 57 MMHG | TEMPERATURE: 98 F | SYSTOLIC BLOOD PRESSURE: 114 MMHG | OXYGEN SATURATION: 99 % | RESPIRATION RATE: 16 BRPM | HEIGHT: 59 IN

## 2018-09-28 DIAGNOSIS — M51.37 DEGENERATIVE DISC DISEASE AT L5-S1 LEVEL: ICD-10-CM

## 2018-09-28 DIAGNOSIS — R10.12 ABDOMINAL PAIN, LEFT UPPER QUADRANT: Primary | ICD-10-CM

## 2018-09-28 DIAGNOSIS — D25.9 UTERINE LEIOMYOMA, UNSPECIFIED LOCATION: ICD-10-CM

## 2018-09-28 PROCEDURE — 74177 CT ABD & PELVIS W/CONTRAST: CPT | Performed by: EMERGENCY MEDICINE

## 2018-09-28 PROCEDURE — 99284 EMERGENCY DEPT VISIT MOD MDM: CPT

## 2018-09-28 PROCEDURE — 96361 HYDRATE IV INFUSION ADD-ON: CPT

## 2018-09-28 PROCEDURE — 96374 THER/PROPH/DIAG INJ IV PUSH: CPT

## 2018-09-28 PROCEDURE — 85025 COMPLETE CBC W/AUTO DIFF WBC: CPT | Performed by: EMERGENCY MEDICINE

## 2018-09-28 PROCEDURE — 96375 TX/PRO/DX INJ NEW DRUG ADDON: CPT

## 2018-09-28 PROCEDURE — 80053 COMPREHEN METABOLIC PANEL: CPT | Performed by: EMERGENCY MEDICINE

## 2018-09-28 PROCEDURE — 81003 URINALYSIS AUTO W/O SCOPE: CPT | Performed by: EMERGENCY MEDICINE

## 2018-09-28 PROCEDURE — 83690 ASSAY OF LIPASE: CPT | Performed by: EMERGENCY MEDICINE

## 2018-09-28 RX ORDER — KETOROLAC TROMETHAMINE 30 MG/ML
30 INJECTION, SOLUTION INTRAMUSCULAR; INTRAVENOUS ONCE
Status: COMPLETED | OUTPATIENT
Start: 2018-09-28 | End: 2018-09-28

## 2018-09-28 RX ORDER — ONDANSETRON 2 MG/ML
4 INJECTION INTRAMUSCULAR; INTRAVENOUS ONCE
Status: COMPLETED | OUTPATIENT
Start: 2018-09-28 | End: 2018-09-28

## 2018-09-28 RX ORDER — FAMOTIDINE 20 MG/1
20 TABLET ORAL 2 TIMES DAILY PRN
Qty: 30 TABLET | Refills: 0 | Status: SHIPPED | OUTPATIENT
Start: 2018-09-28 | End: 2018-10-11

## 2018-09-28 NOTE — ED PROVIDER NOTES
Patient Seen in: THE [facility] [place]    History   Patient presents with:  Abdomen/Flank Pain (GI/)    Stated Complaint: abd pain radiated around left side    HPI    The patient is a 51-year-old female with a history of chronic low OOPHORECTOMY  3/2015: OTHER SURGICAL HISTORY      Comment:  benign  2000: TUBAL LIGATION        Social History    Tobacco Use      Smoking status: Never Smoker      Smokeless tobacco: Never Used    Alcohol use: No      Alcohol/week: 0.0 oz    Drug use:  No Course     Labs Reviewed   COMP METABOLIC PANEL (14) - Abnormal; Notable for the following components:       Result Value    AST 14 (*)     Total Protein 8.4 (*)     Globulin  4.6 (*)     A/G Ratio 0.8 (*)     All other components within normal limits   LI with her PCP. She states that she already was started on pantoprazole by her PCP but only 2 days ago. I believe it would be reasonable to start an H2 blocker in addition to her PPI. So I also discussed with her dietary modification.   She states that she

## 2018-10-30 PROBLEM — K31.7 POLYP OF STOMACH AND DUODENUM: Status: ACTIVE | Noted: 2018-10-30

## 2018-10-30 PROBLEM — R10.12 LEFT UPPER QUADRANT PAIN: Status: ACTIVE | Noted: 2018-10-30

## 2018-10-30 PROBLEM — R14.1 GAS PAIN: Status: ACTIVE | Noted: 2018-10-30

## 2018-11-23 ENCOUNTER — IMAGING SERVICES (OUTPATIENT)
Dept: OTHER | Age: 53
End: 2018-11-23

## 2019-01-22 ENCOUNTER — OFFICE VISIT (OUTPATIENT)
Dept: PODIATRY | Age: 54
End: 2019-01-22

## 2019-01-22 DIAGNOSIS — M76.821 POSTERIOR TIBIAL TENDINITIS OF RIGHT LOWER EXTREMITY: Primary | ICD-10-CM

## 2019-01-22 DIAGNOSIS — S93.691A: ICD-10-CM

## 2019-01-22 DIAGNOSIS — M21.40 PES PLANUS, UNSPECIFIED LATERALITY: ICD-10-CM

## 2019-01-22 DIAGNOSIS — S86.111A TRAUMATIC RUPTURE OF RIGHT POSTERIOR TIBIAL TENDON, INITIAL ENCOUNTER: ICD-10-CM

## 2019-01-22 PROCEDURE — L4360 PNEUMAT WALKING BOOT PRE CST: HCPCS

## 2019-01-22 PROCEDURE — 99204 OFFICE O/P NEW MOD 45 MIN: CPT | Performed by: PODIATRIST

## 2019-01-22 RX ORDER — TRAMADOL HYDROCHLORIDE 50 MG/1
50 TABLET ORAL
COMMUNITY
Start: 2011-01-18 | End: 2021-11-01 | Stop reason: ALTCHOICE

## 2019-01-22 RX ORDER — CLONAZEPAM 1 MG/1
1 TABLET ORAL
COMMUNITY
Start: 2018-07-10

## 2019-01-22 RX ORDER — TRIAMTERENE AND HYDROCHLOROTHIAZIDE 37.5; 25 MG/1; MG/1
TABLET ORAL PRN
COMMUNITY
Start: 2018-05-15

## 2019-01-22 RX ORDER — PANTOPRAZOLE SODIUM 40 MG/1
TABLET, DELAYED RELEASE ORAL
COMMUNITY
Start: 2016-04-02 | End: 2023-09-19 | Stop reason: ALTCHOICE

## 2019-01-22 RX ORDER — DOCUSATE SODIUM 100 MG/1
CAPSULE, LIQUID FILLED ORAL PRN
COMMUNITY
Start: 2006-03-10

## 2019-01-22 RX ORDER — DIPHENOXYLATE HYDROCHLORIDE AND ATROPINE SULFATE 2.5; .025 MG/1; MG/1
TABLET ORAL
COMMUNITY

## 2019-01-22 RX ORDER — LACTOBACIL 2/BIFIDO 1/S.THERMO 450B CELL
PACKET (EA) ORAL
COMMUNITY

## 2019-01-22 RX ORDER — CETIRIZINE HYDROCHLORIDE 10 MG/1
TABLET ORAL PRN
COMMUNITY
Start: 2018-11-01

## 2019-01-22 RX ORDER — KETOPROFEN 25 MG/1
25 CAPSULE ORAL 4 TIMES DAILY PRN
Qty: 28 CAPSULE | Refills: 0 | Status: SHIPPED | OUTPATIENT
Start: 2019-01-22

## 2019-01-23 PROBLEM — S86.111A: Status: ACTIVE | Noted: 2019-01-23

## 2019-01-23 PROBLEM — M76.821 POSTERIOR TIBIAL TENDINITIS OF RIGHT LOWER EXTREMITY: Status: ACTIVE | Noted: 2019-01-23

## 2019-01-23 PROBLEM — M21.40 PES PLANUS: Status: ACTIVE | Noted: 2019-01-23

## 2019-01-23 PROBLEM — S93.699A: Status: ACTIVE | Noted: 2019-01-23

## 2019-01-23 ASSESSMENT — ENCOUNTER SYMPTOMS: NUMBNESS: 1

## 2019-02-08 RX ORDER — MOMETASONE FUROATE 1 MG/G
CREAM TOPICAL
COMMUNITY
End: 2021-11-01 | Stop reason: ALTCHOICE

## 2019-02-19 ENCOUNTER — OFFICE VISIT (OUTPATIENT)
Dept: PODIATRY | Age: 54
End: 2019-02-19

## 2019-02-19 ENCOUNTER — IMAGING SERVICES (OUTPATIENT)
Dept: GENERAL RADIOLOGY | Age: 54
End: 2019-02-19
Attending: PODIATRIST

## 2019-02-19 DIAGNOSIS — S93.691D: ICD-10-CM

## 2019-02-19 DIAGNOSIS — M76.821 POSTERIOR TIBIAL TENDINITIS OF RIGHT LOWER EXTREMITY: ICD-10-CM

## 2019-02-19 DIAGNOSIS — S86.111D TRAUMATIC RUPTURE OF RIGHT POSTERIOR TIBIAL TENDON, SUBSEQUENT ENCOUNTER: ICD-10-CM

## 2019-02-19 DIAGNOSIS — M21.40 PES PLANUS, UNSPECIFIED LATERALITY: ICD-10-CM

## 2019-02-19 DIAGNOSIS — S93.691D: Primary | ICD-10-CM

## 2019-02-19 PROCEDURE — 99213 OFFICE O/P EST LOW 20 MIN: CPT | Performed by: PODIATRIST

## 2019-02-19 PROCEDURE — 73630 X-RAY EXAM OF FOOT: CPT | Performed by: RADIOLOGY

## 2019-02-19 ASSESSMENT — ENCOUNTER SYMPTOMS: NUMBNESS: 1

## 2019-03-05 VITALS — WEIGHT: 202 LBS | HEIGHT: 59 IN | BODY MASS INDEX: 40.72 KG/M2

## 2019-03-15 ENCOUNTER — TELEPHONE (OUTPATIENT)
Dept: CARDIOLOGY | Age: 54
End: 2019-03-15

## 2019-03-28 ENCOUNTER — IMAGING SERVICES (OUTPATIENT)
Dept: MAMMOGRAPHY | Age: 54
End: 2019-03-28

## 2019-03-28 ENCOUNTER — IMAGING SERVICES (OUTPATIENT)
Dept: BONE DENSITY | Age: 54
End: 2019-03-28

## 2019-03-28 ENCOUNTER — IMAGING SERVICES (OUTPATIENT)
Dept: ULTRASOUND IMAGING | Age: 54
End: 2019-03-28

## 2019-03-28 DIAGNOSIS — E05.90 SUBCLINICAL HYPERTHYROIDISM: ICD-10-CM

## 2019-03-28 DIAGNOSIS — E05.20 TOXIC MULTINODULAR GOITER: ICD-10-CM

## 2019-03-28 DIAGNOSIS — Z13.820 SCREENING FOR OSTEOPOROSIS: ICD-10-CM

## 2019-03-28 DIAGNOSIS — Z12.31 VISIT FOR SCREENING MAMMOGRAM: ICD-10-CM

## 2019-03-28 PROCEDURE — 76536 US EXAM OF HEAD AND NECK: CPT | Performed by: RADIOLOGY

## 2019-03-28 PROCEDURE — 77080 DXA BONE DENSITY AXIAL: CPT | Performed by: RADIOLOGY

## 2019-03-28 PROCEDURE — 77063 BREAST TOMOSYNTHESIS BI: CPT | Performed by: RADIOLOGY

## 2019-03-28 PROCEDURE — 77067 SCR MAMMO BI INCL CAD: CPT | Performed by: RADIOLOGY

## 2019-04-02 ENCOUNTER — OFFICE VISIT (OUTPATIENT)
Dept: PODIATRY | Age: 54
End: 2019-04-02

## 2019-04-02 DIAGNOSIS — S86.111D TRAUMATIC RUPTURE OF RIGHT POSTERIOR TIBIAL TENDON, SUBSEQUENT ENCOUNTER: ICD-10-CM

## 2019-04-02 DIAGNOSIS — M21.40 PES PLANUS, UNSPECIFIED LATERALITY: ICD-10-CM

## 2019-04-02 DIAGNOSIS — S93.691D: ICD-10-CM

## 2019-04-02 DIAGNOSIS — M21.6X2 ACQUIRED EQUINUS DEFORMITY OF BOTH FEET: ICD-10-CM

## 2019-04-02 DIAGNOSIS — M21.6X1 ACQUIRED EQUINUS DEFORMITY OF BOTH FEET: ICD-10-CM

## 2019-04-02 DIAGNOSIS — M76.821 POSTERIOR TIBIAL TENDINITIS OF RIGHT LOWER EXTREMITY: Primary | ICD-10-CM

## 2019-04-02 PROCEDURE — 99214 OFFICE O/P EST MOD 30 MIN: CPT | Performed by: PODIATRIST

## 2019-04-02 ASSESSMENT — ENCOUNTER SYMPTOMS: NUMBNESS: 1

## 2019-04-03 PROBLEM — M21.6X1 ACQUIRED EQUINUS DEFORMITY OF BOTH FEET: Status: ACTIVE | Noted: 2019-04-03

## 2019-04-03 PROBLEM — M21.6X2 ACQUIRED EQUINUS DEFORMITY OF BOTH FEET: Status: ACTIVE | Noted: 2019-04-03

## 2019-04-15 ENCOUNTER — IMAGING SERVICES (OUTPATIENT)
Dept: MAMMOGRAPHY | Age: 54
End: 2019-04-15

## 2019-04-15 PROCEDURE — X1094 NO CHARGE VISIT: HCPCS | Performed by: FAMILY MEDICINE

## 2019-05-28 ENCOUNTER — OFFICE VISIT (OUTPATIENT)
Dept: PODIATRY | Age: 54
End: 2019-05-28

## 2019-05-28 DIAGNOSIS — M21.6X1 ACQUIRED EQUINUS DEFORMITY OF BOTH FEET: ICD-10-CM

## 2019-05-28 DIAGNOSIS — M21.6X2 ACQUIRED EQUINUS DEFORMITY OF BOTH FEET: ICD-10-CM

## 2019-05-28 DIAGNOSIS — M76.821 POSTERIOR TIBIAL TENDINITIS OF RIGHT LOWER EXTREMITY: ICD-10-CM

## 2019-05-28 DIAGNOSIS — M21.40 PES PLANUS, UNSPECIFIED LATERALITY: Primary | ICD-10-CM

## 2019-05-28 DIAGNOSIS — S93.691D: ICD-10-CM

## 2019-05-28 PROCEDURE — 99214 OFFICE O/P EST MOD 30 MIN: CPT | Performed by: PODIATRIST

## 2019-05-28 ASSESSMENT — ENCOUNTER SYMPTOMS: NUMBNESS: 1

## 2019-07-01 ENCOUNTER — EXTERNAL RECORD (OUTPATIENT)
Dept: HEALTH INFORMATION MANAGEMENT | Facility: OTHER | Age: 54
End: 2019-07-01

## 2019-07-23 PROBLEM — M21.6X1 ACQUIRED EQUINUS DEFORMITY OF BOTH FEET: Status: ACTIVE | Noted: 2019-04-03

## 2019-07-23 PROBLEM — S93.699A: Status: ACTIVE | Noted: 2019-01-23

## 2019-07-23 PROBLEM — S86.111A: Status: ACTIVE | Noted: 2019-01-23

## 2019-07-23 PROBLEM — M21.6X2 ACQUIRED EQUINUS DEFORMITY OF BOTH FEET: Status: ACTIVE | Noted: 2019-04-03

## 2019-07-23 PROBLEM — M76.821 POSTERIOR TIBIAL TENDINITIS OF RIGHT LOWER EXTREMITY: Status: ACTIVE | Noted: 2019-01-23

## 2019-07-23 PROBLEM — M21.40 PES PLANUS: Status: ACTIVE | Noted: 2019-01-23

## 2019-09-05 ENCOUNTER — TELEPHONE (OUTPATIENT)
Dept: OPHTHALMOLOGY | Age: 54
End: 2019-09-05

## 2019-11-01 ENCOUNTER — APPOINTMENT (OUTPATIENT)
Dept: CT IMAGING | Age: 54
End: 2019-11-01

## 2019-11-06 ENCOUNTER — IMAGING SERVICES (OUTPATIENT)
Dept: CT IMAGING | Age: 54
End: 2019-11-06

## 2019-11-06 DIAGNOSIS — R10.9 FLANK PAIN: ICD-10-CM

## 2019-11-06 DIAGNOSIS — N20.0 RENAL CALCULI: ICD-10-CM

## 2019-11-06 PROCEDURE — 74176 CT ABD & PELVIS W/O CONTRAST: CPT | Performed by: RADIOLOGY

## 2019-11-07 ENCOUNTER — EXTERNAL RECORD (OUTPATIENT)
Dept: HEALTH INFORMATION MANAGEMENT | Facility: OTHER | Age: 54
End: 2019-11-07

## 2019-11-08 ENCOUNTER — TELEPHONE (OUTPATIENT)
Dept: GASTROENTEROLOGY | Age: 54
End: 2019-11-08

## 2019-11-19 ENCOUNTER — TELEPHONE (OUTPATIENT)
Dept: GASTROENTEROLOGY | Age: 54
End: 2019-11-19

## 2019-11-19 ENCOUNTER — OFFICE VISIT (OUTPATIENT)
Dept: GASTROENTEROLOGY | Age: 54
End: 2019-11-19

## 2019-11-19 VITALS — BODY MASS INDEX: 41.73 KG/M2 | WEIGHT: 207 LBS | HEIGHT: 59 IN

## 2019-11-19 DIAGNOSIS — R93.5 ABNORMAL CT OF THE ABDOMEN: ICD-10-CM

## 2019-11-19 DIAGNOSIS — R10.12 LUQ PAIN: Primary | ICD-10-CM

## 2019-11-19 DIAGNOSIS — R93.5 ABNORMAL CT OF THE ABDOMEN: Primary | ICD-10-CM

## 2019-11-19 DIAGNOSIS — K59.00 CONSTIPATION, UNSPECIFIED CONSTIPATION TYPE: ICD-10-CM

## 2019-11-19 PROCEDURE — 99204 OFFICE O/P NEW MOD 45 MIN: CPT | Performed by: INTERNAL MEDICINE

## 2019-11-19 RX ORDER — LUBIPROSTONE 8 UG/1
8 CAPSULE ORAL 2 TIMES DAILY WITH MEALS
Qty: 60 CAPSULE | Refills: 5 | Status: SHIPPED | OUTPATIENT
Start: 2019-11-19 | End: 2019-12-27 | Stop reason: DRUGHIGH

## 2019-11-20 RX ORDER — SIMETHICONE 125 MG
TABLET,CHEWABLE ORAL
Qty: 2 TABLET | Refills: 0 | Status: SHIPPED | OUTPATIENT
Start: 2019-11-20 | End: 2020-01-04

## 2019-11-20 RX ORDER — BISACODYL 5 MG/1
TABLET, DELAYED RELEASE ORAL
Qty: 2 TABLET | Refills: 0 | Status: SHIPPED | OUTPATIENT
Start: 2019-11-20 | End: 2020-01-04

## 2019-12-12 ENCOUNTER — APPOINTMENT (OUTPATIENT)
Dept: OTHER | Facility: PHYSICIAN GROUP | Age: 54
End: 2019-12-12
Attending: INTERNAL MEDICINE

## 2019-12-12 ENCOUNTER — EXTERNAL RECORD (OUTPATIENT)
Dept: GASTROENTEROLOGY | Age: 54
End: 2019-12-12

## 2019-12-12 ENCOUNTER — EXTERNAL RECORD (OUTPATIENT)
Dept: HEALTH INFORMATION MANAGEMENT | Facility: OTHER | Age: 54
End: 2019-12-12

## 2019-12-12 PROCEDURE — 45385 COLONOSCOPY W/LESION REMOVAL: CPT | Performed by: INTERNAL MEDICINE

## 2019-12-13 ENCOUNTER — TELEPHONE (OUTPATIENT)
Dept: GASTROENTEROLOGY | Age: 54
End: 2019-12-13

## 2019-12-27 DIAGNOSIS — Z98.890 S/P COLONOSCOPY: ICD-10-CM

## 2019-12-27 DIAGNOSIS — R93.89 ABNORMAL CT SCAN: Primary | ICD-10-CM

## 2019-12-27 RX ORDER — LUBIPROSTONE 8 UG/1
24 CAPSULE ORAL 2 TIMES DAILY WITH MEALS
Qty: 60 CAPSULE | Refills: 5 | Status: SHIPPED | OUTPATIENT
Start: 2019-12-27 | End: 2019-12-30 | Stop reason: DRUGHIGH

## 2019-12-27 RX ORDER — LUBIPROSTONE 8 UG/1
24 CAPSULE ORAL 2 TIMES DAILY WITH MEALS
Qty: 60 CAPSULE | Refills: 5 | Status: SHIPPED | OUTPATIENT
Start: 2019-12-27 | End: 2019-12-27 | Stop reason: SDUPTHER

## 2019-12-30 ENCOUNTER — TELEPHONE (OUTPATIENT)
Dept: GASTROENTEROLOGY | Age: 54
End: 2019-12-30

## 2019-12-30 RX ORDER — LUBIPROSTONE 24 UG/1
24 CAPSULE ORAL 2 TIMES DAILY WITH MEALS
Qty: 60 CAPSULE | Refills: 5 | Status: SHIPPED | OUTPATIENT
Start: 2019-12-30 | End: 2021-11-01 | Stop reason: ALTCHOICE

## 2020-02-21 ENCOUNTER — APPOINTMENT (OUTPATIENT)
Dept: OPHTHALMOLOGY | Age: 55
End: 2020-02-21

## 2020-09-21 ENCOUNTER — TELEPHONE (OUTPATIENT)
Dept: OPHTHALMOLOGY | Age: 55
End: 2020-09-21

## 2020-09-22 ENCOUNTER — LAB ENCOUNTER (OUTPATIENT)
Dept: LAB | Age: 55
End: 2020-09-22
Attending: INTERNAL MEDICINE
Payer: MEDICARE

## 2020-09-22 DIAGNOSIS — E05.20 TOXIC MULTINODULAR GOITER: ICD-10-CM

## 2020-09-22 DIAGNOSIS — E05.90 PRETIBIAL MYXEDEMA: Primary | ICD-10-CM

## 2020-09-22 LAB
T3 SERPL-MCNC: 90 NG/DL (ref 60–181)
T4 FREE SERPL-MCNC: 1 NG/DL (ref 0.8–1.7)
THYROPEROXIDASE AB SERPL-ACNC: 37 U/ML (ref ?–60)
TSI SER-ACNC: 0.73 MIU/ML (ref 0.36–3.74)

## 2020-09-22 PROCEDURE — 86376 MICROSOMAL ANTIBODY EACH: CPT

## 2020-09-22 PROCEDURE — 84439 ASSAY OF FREE THYROXINE: CPT

## 2020-09-22 PROCEDURE — 84443 ASSAY THYROID STIM HORMONE: CPT

## 2020-09-22 PROCEDURE — 84480 ASSAY TRIIODOTHYRONINE (T3): CPT

## 2020-09-22 PROCEDURE — 36415 COLL VENOUS BLD VENIPUNCTURE: CPT

## 2020-09-24 ENCOUNTER — HOSPITAL ENCOUNTER (OUTPATIENT)
Dept: ULTRASOUND IMAGING | Age: 55
Discharge: HOME | End: 2020-09-24
Attending: INTERNAL MEDICINE
Payer: MEDICARE

## 2020-09-24 DIAGNOSIS — E05.20 TOXIC MULTINODULAR GOITER: ICD-10-CM

## 2020-09-24 DIAGNOSIS — E05.90 SUBCLINICAL HYPERTHYROIDISM: ICD-10-CM

## 2020-09-24 PROCEDURE — 76536 US EXAM OF HEAD AND NECK: CPT | Performed by: INTERNAL MEDICINE

## 2020-10-09 ENCOUNTER — TELEPHONE (OUTPATIENT)
Dept: OPHTHALMOLOGY | Age: 55
End: 2020-10-09

## 2021-01-01 NOTE — PROGRESS NOTES
Quick Note:    Follow up regarding this sleep study will be coordinated through my practice  ______
Quick Note:    Pt should follow-up with sleep medicine  ______
Home

## 2021-05-18 ENCOUNTER — TELEPHONE (OUTPATIENT)
Dept: OTOLARYNGOLOGY | Age: 56
End: 2021-05-18

## 2021-05-21 ENCOUNTER — OFFICE VISIT (OUTPATIENT)
Dept: OTOLARYNGOLOGY | Age: 56
End: 2021-05-21

## 2021-05-21 ENCOUNTER — OFFICE VISIT (OUTPATIENT)
Dept: AUDIOLOGY | Age: 56
End: 2021-05-21

## 2021-05-21 VITALS — WEIGHT: 195 LBS | HEIGHT: 59 IN | BODY MASS INDEX: 39.31 KG/M2

## 2021-05-21 DIAGNOSIS — H90.3 SENSORINEURAL HEARING LOSS (SNHL) OF BOTH EARS: Primary | ICD-10-CM

## 2021-05-21 DIAGNOSIS — S03.00XA DISLOCATION OF TEMPOROMANDIBULAR JOINT, INITIAL ENCOUNTER: ICD-10-CM

## 2021-05-21 DIAGNOSIS — H60.543 ECZEMA OF EXTERNAL EAR, BILATERAL: ICD-10-CM

## 2021-05-21 PROCEDURE — 92557 COMPREHENSIVE HEARING TEST: CPT | Performed by: AUDIOLOGIST

## 2021-05-21 PROCEDURE — 92567 TYMPANOMETRY: CPT | Performed by: AUDIOLOGIST

## 2021-05-21 PROCEDURE — 99214 OFFICE O/P EST MOD 30 MIN: CPT | Performed by: OTOLARYNGOLOGY

## 2021-05-21 RX ORDER — PREDNISONE 10 MG/1
TABLET ORAL
Qty: 22 TABLET | Refills: 0 | Status: SHIPPED | OUTPATIENT
Start: 2021-05-21 | End: 2021-05-29

## 2021-05-21 RX ORDER — MOMETASONE FUROATE 1 MG/G
CREAM TOPICAL
Qty: 15 G | Refills: 2 | Status: SHIPPED | OUTPATIENT
Start: 2021-05-21 | End: 2021-11-01 | Stop reason: ALTCHOICE

## 2021-05-25 ENCOUNTER — TELEPHONE (OUTPATIENT)
Dept: OTOLARYNGOLOGY | Age: 56
End: 2021-05-25

## 2021-05-25 DIAGNOSIS — S03.00XA DISLOCATION OF TEMPOROMANDIBULAR JOINT, INITIAL ENCOUNTER: Primary | ICD-10-CM

## 2021-05-26 ENCOUNTER — APPOINTMENT (OUTPATIENT)
Dept: OTOLARYNGOLOGY | Age: 56
End: 2021-05-26

## 2021-05-27 ENCOUNTER — IMAGING SERVICES (OUTPATIENT)
Dept: MAMMOGRAPHY | Age: 56
End: 2021-05-27
Attending: INTERNAL MEDICINE

## 2021-05-27 ENCOUNTER — APPOINTMENT (OUTPATIENT)
Dept: MAMMOGRAPHY | Age: 56
End: 2021-05-27
Attending: INTERNAL MEDICINE

## 2021-05-27 DIAGNOSIS — Z12.31 VISIT FOR SCREENING MAMMOGRAM: ICD-10-CM

## 2021-05-27 PROCEDURE — 77067 SCR MAMMO BI INCL CAD: CPT | Performed by: RADIOLOGY

## 2021-05-27 PROCEDURE — 77063 BREAST TOMOSYNTHESIS BI: CPT | Performed by: RADIOLOGY

## 2021-07-14 ENCOUNTER — APPOINTMENT (OUTPATIENT)
Dept: CT IMAGING | Age: 56
End: 2021-07-14
Attending: EMERGENCY MEDICINE

## 2021-07-14 ENCOUNTER — HOSPITAL ENCOUNTER (EMERGENCY)
Age: 56
Discharge: HOME | End: 2021-07-15
Attending: EMERGENCY MEDICINE

## 2021-07-14 DIAGNOSIS — M54.12 CERVICAL RADICULOPATHY: ICD-10-CM

## 2021-07-14 DIAGNOSIS — R51.9 CHRONIC LEFT-SIDED HEADACHE: Primary | ICD-10-CM

## 2021-07-14 DIAGNOSIS — G89.29 CHRONIC LEFT-SIDED HEADACHE: Primary | ICD-10-CM

## 2021-07-14 DIAGNOSIS — G50.0 TRIGEMINAL NEURALGIA: ICD-10-CM

## 2021-07-14 LAB
ANION GAP SERPL CALC-SCNC: 7 MMOL/L (ref 10–20)
BASOPHILS # BLD: 0 K/MCL (ref 0–0.3)
BASOPHILS NFR BLD: 0 %
BUN SERPL-MCNC: 14 MG/DL (ref 6–20)
BUN/CREAT SERPL: 23 (ref 7–25)
CALCIUM SERPL-MCNC: 9 MG/DL (ref 8.4–10.2)
CHLORIDE SERPL-SCNC: 104 MMOL/L (ref 98–107)
CO2 SERPL-SCNC: 31 MMOL/L (ref 21–32)
CREAT SERPL-MCNC: 0.61 MG/DL (ref 0.51–0.95)
CRP SERPL-MCNC: 0.4 MG/DL
DEPRECATED RDW RBC: 42.8 FL (ref 39–50)
EOSINOPHIL # BLD: 0.1 K/MCL (ref 0–0.5)
EOSINOPHIL NFR BLD: 1 %
ERYTHROCYTE [DISTWIDTH] IN BLOOD: 12.8 % (ref 11–15)
ERYTHROCYTE [SEDIMENTATION RATE] IN BLOOD BY WESTERGREN METHOD: 23 MM/HR (ref 0–20)
FASTING DURATION TIME PATIENT: ABNORMAL H
GFR SERPLBLD BASED ON 1.73 SQ M-ARVRAT: >90 ML/MIN/1.73M2
GLUCOSE SERPL-MCNC: 103 MG/DL (ref 65–99)
HCT VFR BLD CALC: 36 % (ref 36–46.5)
HGB BLD-MCNC: 11.9 G/DL (ref 12–15.5)
IMM GRANULOCYTES # BLD AUTO: 0 K/MCL (ref 0–0.2)
IMM GRANULOCYTES # BLD: 0 %
LYMPHOCYTES # BLD: 3.7 K/MCL (ref 1–4)
LYMPHOCYTES NFR BLD: 43 %
MAGNESIUM SERPL-MCNC: 2.3 MG/DL (ref 1.7–2.4)
MCH RBC QN AUTO: 30.4 PG (ref 26–34)
MCHC RBC AUTO-ENTMCNC: 33.1 G/DL (ref 32–36.5)
MCV RBC AUTO: 91.8 FL (ref 78–100)
MONOCYTES # BLD: 0.5 K/MCL (ref 0.3–0.9)
MONOCYTES NFR BLD: 6 %
NEUTROPHILS # BLD: 4.3 K/MCL (ref 1.8–7.7)
NEUTROPHILS NFR BLD: 50 %
NRBC BLD MANUAL-RTO: 0 /100 WBC
PLATELET # BLD AUTO: 279 K/MCL (ref 140–450)
POTASSIUM SERPL-SCNC: 3.8 MMOL/L (ref 3.4–5.1)
RBC # BLD: 3.92 MIL/MCL (ref 4–5.2)
SODIUM SERPL-SCNC: 138 MMOL/L (ref 135–145)
WBC # BLD: 8.6 K/MCL (ref 4.2–11)

## 2021-07-14 PROCEDURE — 96374 THER/PROPH/DIAG INJ IV PUSH: CPT

## 2021-07-14 PROCEDURE — 70498 CT ANGIOGRAPHY NECK: CPT

## 2021-07-14 PROCEDURE — 70496 CT ANGIOGRAPHY HEAD: CPT

## 2021-07-14 PROCEDURE — 83735 ASSAY OF MAGNESIUM: CPT | Performed by: EMERGENCY MEDICINE

## 2021-07-14 PROCEDURE — 80048 BASIC METABOLIC PNL TOTAL CA: CPT | Performed by: EMERGENCY MEDICINE

## 2021-07-14 PROCEDURE — 85025 COMPLETE CBC W/AUTO DIFF WBC: CPT | Performed by: EMERGENCY MEDICINE

## 2021-07-14 PROCEDURE — 96361 HYDRATE IV INFUSION ADD-ON: CPT

## 2021-07-14 PROCEDURE — 84443 ASSAY THYROID STIM HORMONE: CPT | Performed by: EMERGENCY MEDICINE

## 2021-07-14 PROCEDURE — 10002800 HB RX 250 W HCPCS: Performed by: EMERGENCY MEDICINE

## 2021-07-14 PROCEDURE — 99284 EMERGENCY DEPT VISIT MOD MDM: CPT

## 2021-07-14 PROCEDURE — 10002803 HB RX 637: Performed by: EMERGENCY MEDICINE

## 2021-07-14 PROCEDURE — 10002807 HB RX 258: Performed by: EMERGENCY MEDICINE

## 2021-07-14 PROCEDURE — 96375 TX/PRO/DX INJ NEW DRUG ADDON: CPT

## 2021-07-14 PROCEDURE — 84145 PROCALCITONIN (PCT): CPT | Performed by: EMERGENCY MEDICINE

## 2021-07-14 PROCEDURE — 85652 RBC SED RATE AUTOMATED: CPT | Performed by: EMERGENCY MEDICINE

## 2021-07-14 PROCEDURE — 86140 C-REACTIVE PROTEIN: CPT | Performed by: EMERGENCY MEDICINE

## 2021-07-14 RX ORDER — MELOXICAM 15 MG/1
TABLET ORAL
COMMUNITY
Start: 2021-04-23

## 2021-07-14 RX ORDER — BUTALBITAL, ACETAMINOPHEN AND CAFFEINE 50; 325; 40 MG/1; MG/1; MG/1
2 TABLET ORAL ONCE
Status: COMPLETED | OUTPATIENT
Start: 2021-07-14 | End: 2021-07-14

## 2021-07-14 RX ORDER — DIPHENHYDRAMINE HYDROCHLORIDE 50 MG/ML
25 INJECTION INTRAMUSCULAR; INTRAVENOUS ONCE
Status: COMPLETED | OUTPATIENT
Start: 2021-07-14 | End: 2021-07-14

## 2021-07-14 RX ORDER — METOCLOPRAMIDE HYDROCHLORIDE 5 MG/ML
10 INJECTION INTRAMUSCULAR; INTRAVENOUS ONCE
Status: COMPLETED | OUTPATIENT
Start: 2021-07-14 | End: 2021-07-14

## 2021-07-14 RX ORDER — ALBUTEROL SULFATE 2.5 MG/3ML
SOLUTION RESPIRATORY (INHALATION)
COMMUNITY
Start: 2021-05-05

## 2021-07-14 RX ADMIN — METOCLOPRAMIDE HYDROCHLORIDE 10 MG: 5 INJECTION INTRAMUSCULAR; INTRAVENOUS at 23:02

## 2021-07-14 RX ADMIN — SODIUM CHLORIDE, POTASSIUM CHLORIDE, SODIUM LACTATE AND CALCIUM CHLORIDE 1000 ML: 600; 310; 30; 20 INJECTION, SOLUTION INTRAVENOUS at 23:02

## 2021-07-14 RX ADMIN — DIPHENHYDRAMINE HYDROCHLORIDE 25 MG: 50 INJECTION, SOLUTION INTRAMUSCULAR; INTRAVENOUS at 23:02

## 2021-07-14 RX ADMIN — BUTALBITAL, ACETAMINOPHEN AND CAFFEINE 2 TABLET: 50; 325; 40 TABLET ORAL at 23:02

## 2021-07-14 ASSESSMENT — PAIN SCALES - GENERAL: PAINLEVEL_OUTOF10: 8

## 2021-07-15 VITALS
OXYGEN SATURATION: 100 % | TEMPERATURE: 97.7 F | BODY MASS INDEX: 38.3 KG/M2 | WEIGHT: 190 LBS | RESPIRATION RATE: 17 BRPM | SYSTOLIC BLOOD PRESSURE: 117 MMHG | DIASTOLIC BLOOD PRESSURE: 76 MMHG | HEART RATE: 78 BPM | HEIGHT: 59 IN

## 2021-07-15 LAB
PROCALCITONIN SERPL IA-MCNC: <0.05 NG/ML
RAINBOW EXTRA TUBES HOLD SPECIMEN: NORMAL
TSH SERPL-ACNC: 0.56 MCUNITS/ML (ref 0.35–5)

## 2021-07-15 PROCEDURE — 10002805 HB CONTRAST AGENT: Performed by: EMERGENCY MEDICINE

## 2021-07-15 RX ORDER — BUTALBITAL, ACETAMINOPHEN AND CAFFEINE 50; 325; 40 MG/1; MG/1; MG/1
1-2 TABLET ORAL EVERY 8 HOURS PRN
Qty: 15 TABLET | Refills: 0 | Status: SHIPPED | OUTPATIENT
Start: 2021-07-15 | End: 2021-07-22

## 2021-07-15 RX ORDER — IBUPROFEN 800 MG/1
800 TABLET ORAL EVERY 8 HOURS PRN
Qty: 21 TABLET | Refills: 0 | Status: SHIPPED | OUTPATIENT
Start: 2021-07-15 | End: 2021-07-22

## 2021-07-15 RX ADMIN — IOHEXOL 85 ML: 350 INJECTION, SOLUTION INTRAVENOUS at 00:21

## 2021-08-02 ENCOUNTER — APPOINTMENT (OUTPATIENT)
Dept: MAMMOGRAPHY | Age: 56
End: 2021-08-02
Attending: INTERNAL MEDICINE

## 2021-08-02 ENCOUNTER — OFFICE VISIT (OUTPATIENT)
Dept: DERMATOLOGY | Age: 56
End: 2021-08-02

## 2021-08-02 DIAGNOSIS — L21.9 SEBORRHEIC DERMATITIS: Primary | ICD-10-CM

## 2021-08-02 PROCEDURE — 99203 OFFICE O/P NEW LOW 30 MIN: CPT | Performed by: DERMATOLOGY

## 2021-08-02 RX ORDER — PREDNISONE 20 MG/1
TABLET ORAL
Qty: 6 TABLET | Refills: 0 | Status: SHIPPED | OUTPATIENT
Start: 2021-08-02 | End: 2021-11-01 | Stop reason: ALTCHOICE

## 2021-08-02 RX ORDER — BETAMETHASONE DIPROPIONATE 0.5 MG/ML
LOTION, AUGMENTED TOPICAL 2 TIMES DAILY
Qty: 30 ML | Refills: 0 | Status: SHIPPED | OUTPATIENT
Start: 2021-08-02 | End: 2021-11-01 | Stop reason: ALTCHOICE

## 2021-10-19 ENCOUNTER — OFFICE VISIT (OUTPATIENT)
Dept: NEUROLOGY | Facility: CLINIC | Age: 56
End: 2021-10-19
Payer: MEDICARE

## 2021-10-19 ENCOUNTER — LAB ENCOUNTER (OUTPATIENT)
Dept: LAB | Age: 56
End: 2021-10-19
Attending: Other
Payer: MEDICARE

## 2021-10-19 VITALS
SYSTOLIC BLOOD PRESSURE: 128 MMHG | DIASTOLIC BLOOD PRESSURE: 78 MMHG | BODY MASS INDEX: 40 KG/M2 | WEIGHT: 198.31 LBS | RESPIRATION RATE: 16 BRPM | HEART RATE: 78 BPM

## 2021-10-19 DIAGNOSIS — F41.9 ANXIETY: ICD-10-CM

## 2021-10-19 DIAGNOSIS — M54.42 CHRONIC LEFT-SIDED LOW BACK PAIN WITH BILATERAL SCIATICA: ICD-10-CM

## 2021-10-19 DIAGNOSIS — G44.209 TENSION-TYPE HEADACHE, NOT INTRACTABLE, UNSPECIFIED CHRONICITY PATTERN: ICD-10-CM

## 2021-10-19 DIAGNOSIS — G44.51 HEMICRANIA CONTINUA: ICD-10-CM

## 2021-10-19 DIAGNOSIS — G89.29 CHRONIC LEFT-SIDED LOW BACK PAIN WITH BILATERAL SCIATICA: ICD-10-CM

## 2021-10-19 DIAGNOSIS — H93.12 TINNITUS, LEFT: ICD-10-CM

## 2021-10-19 DIAGNOSIS — M54.2 NECK PAIN: ICD-10-CM

## 2021-10-19 DIAGNOSIS — F51.01 PRIMARY INSOMNIA: ICD-10-CM

## 2021-10-19 DIAGNOSIS — M54.41 CHRONIC LEFT-SIDED LOW BACK PAIN WITH BILATERAL SCIATICA: ICD-10-CM

## 2021-10-19 DIAGNOSIS — R20.0 NUMBNESS IN LEFT LEG: ICD-10-CM

## 2021-10-19 DIAGNOSIS — H93.12 TINNITUS OF LEFT EAR: Primary | ICD-10-CM

## 2021-10-19 PROBLEM — M54.9 CHRONIC LEFT-SIDED BACK PAIN: Status: ACTIVE | Noted: 2021-10-19

## 2021-10-19 PROCEDURE — 36415 COLL VENOUS BLD VENIPUNCTURE: CPT

## 2021-10-19 PROCEDURE — 86038 ANTINUCLEAR ANTIBODIES: CPT

## 2021-10-19 PROCEDURE — 3078F DIAST BP <80 MM HG: CPT | Performed by: OTHER

## 2021-10-19 PROCEDURE — 99205 OFFICE O/P NEW HI 60 MIN: CPT | Performed by: OTHER

## 2021-10-19 PROCEDURE — 82607 VITAMIN B-12: CPT

## 2021-10-19 PROCEDURE — 85652 RBC SED RATE AUTOMATED: CPT

## 2021-10-19 PROCEDURE — 86431 RHEUMATOID FACTOR QUANT: CPT

## 2021-10-19 PROCEDURE — 3074F SYST BP LT 130 MM HG: CPT | Performed by: OTHER

## 2021-10-19 PROCEDURE — 82746 ASSAY OF FOLIC ACID SERUM: CPT

## 2021-10-19 RX ORDER — MELOXICAM 15 MG/1
15 TABLET ORAL DAILY
COMMUNITY
Start: 2021-09-21

## 2021-10-19 RX ORDER — PREDNISONE 10 MG/1
10 TABLET ORAL DAILY
Qty: 36 TABLET | Refills: 0 | Status: SHIPPED | OUTPATIENT
Start: 2021-10-19 | End: 2021-10-29

## 2021-10-19 RX ORDER — ALBUTEROL SULFATE 90 UG/1
2 AEROSOL, METERED RESPIRATORY (INHALATION) EVERY 4 HOURS PRN
COMMUNITY
Start: 2021-04-30

## 2021-10-19 RX ORDER — ALBUTEROL SULFATE 2.5 MG/3ML
SOLUTION RESPIRATORY (INHALATION)
COMMUNITY
Start: 2021-05-05

## 2021-10-19 RX ORDER — MECLIZINE HYDROCHLORIDE 25 MG/1
TABLET ORAL
COMMUNITY
Start: 2021-09-21

## 2021-10-19 RX ORDER — TOPIRAMATE 25 MG/1
25 TABLET ORAL 2 TIMES DAILY
Qty: 60 TABLET | Refills: 5 | Status: SHIPPED | OUTPATIENT
Start: 2021-10-19

## 2021-10-19 RX ORDER — CETIRIZINE HYDROCHLORIDE 10 MG/1
10 TABLET ORAL DAILY
COMMUNITY
Start: 2021-08-30

## 2021-10-19 RX ORDER — KETOCONAZOLE 20 MG/ML
SHAMPOO TOPICAL
COMMUNITY
Start: 2021-08-04

## 2021-10-19 RX ORDER — SUMATRIPTAN 50 MG/1
TABLET, FILM COATED ORAL
Qty: 9 TABLET | Refills: 5 | Status: SHIPPED | OUTPATIENT
Start: 2021-10-19

## 2021-10-19 NOTE — PROGRESS NOTES
Patient states left sided pressure on the face. Patient states headaches have increased. She is also having dizziness on and off. Patient states left sided tingling and cold sensation in the foot. Denies changes in speech.  Patient states pins and needle se

## 2021-10-19 NOTE — PROGRESS NOTES
[facility]   Neurology; INITIAL CLINIC VISIT  10/19/2021, 1:46 PM     Joss Ralph Patient Status:  No patient class for patient encounter    1965 MRN [id number]   [facility] Staff 10-1-2014    benign pt stated   • H/O mammogram 5/22/2014    normal   • Obstructive sleep apnea (adult) (pediatric) Edward PSG 1-26-17    AHI 5.3 SaO2 cole 81 % autoPAP 6-16 HME   • Pain in joints    • Pap smear for cervical cancer screening 10-1-2014 APPLY 10 ML TOPICALLY TO THE SCALP 2 TIMES EVERY WEEK     • topiramate (TOPAMAX) 25 MG Oral Tab Take 1 tablet (25 mg total) by mouth 2 (two) times daily. 60 tablet 5   • predniSONE 10 MG Oral Tab Take 1 tablet (10 mg total) by mouth daily for 10 days.  Take complaints in  upper or lower extremities  NEURO: see HPI;  PSYCHE: no symptoms of depression or anxiety  HEMATOLOGY:  denies bruising or excessive bleeding  ENDOCRINE: denies excessive thirst or urination; denies unexpected wt gain or wt loss  ALLERGY/IMM sign is negative; Coordination: Normal FTN and HTS;   Gait: Normal based,  Romberg sign is negative, Tandem walk is normal       LABS:     Reviewed with patient      IMAGING: reviewed film or imaging with patient.     CTA brain and neck  1.  CT of the head pain    (G44.209) Tension-type headache, not intractable, unspecified chronicity pattern    (M54.41,  M54.42,  G89.29) Chronic left-sided low back pain with bilateral sciatica  Plan: EMG    (F41.9) Anxiety    (R20.0) Numbness in left leg    (F51.01) Primar

## 2021-10-20 ENCOUNTER — TELEPHONE (OUTPATIENT)
Dept: NEUROLOGY | Facility: CLINIC | Age: 56
End: 2021-10-20

## 2021-10-20 NOTE — TELEPHONE ENCOUNTER
Leif Pendleton MD   10/19/2021  6:00 PM CDT Back to Top        B12 is very high, she does not need to take B supplement     Informed pt. Verbalized understanding, no further questions.

## 2021-10-22 ENCOUNTER — TELEPHONE (OUTPATIENT)
Dept: SURGERY | Facility: CLINIC | Age: 56
End: 2021-10-22

## 2021-10-22 DIAGNOSIS — H93.19 TINNITUS, UNSPECIFIED LATERALITY: ICD-10-CM

## 2021-10-22 DIAGNOSIS — G44.209 TENSION-TYPE HEADACHE, NOT INTRACTABLE, UNSPECIFIED CHRONICITY PATTERN: Primary | ICD-10-CM

## 2021-10-22 DIAGNOSIS — R42 DIZZINESS: ICD-10-CM

## 2021-10-22 DIAGNOSIS — R51.9 WORSENING HEADACHES: ICD-10-CM

## 2021-10-22 NOTE — TELEPHONE ENCOUNTER
Pt called back and wants to know when Dr. Adams Fam in for a new order w/o contrast could the new order be sent over to the [facility], located at [facility], [address] [place] [address], phone# [phone]

## 2021-10-22 NOTE — TELEPHONE ENCOUNTER
Patient states she was scheduling MRI that was ordered by Filipe Tabor and when patient called to schedule she was notified it was W/WO contrast. Patient states she does not want to do the imaging with contrast due to some side effects she had last time with sandra

## 2021-10-25 NOTE — TELEPHONE ENCOUNTER
New order placed for MRI Brain w/o contrast to be done at [address].  Spoke with Michelle Alvarez in [address] who states she will call insurance and send [facility] the order and approval.

## 2021-11-01 ENCOUNTER — TELEPHONE (OUTPATIENT)
Dept: URGENT CARE | Age: 56
End: 2021-11-01

## 2021-11-01 ENCOUNTER — WALK IN (OUTPATIENT)
Dept: URGENT CARE | Age: 56
End: 2021-11-01

## 2021-11-01 ENCOUNTER — IMAGING SERVICES (OUTPATIENT)
Dept: OTHER | Age: 56
End: 2021-11-01

## 2021-11-01 VITALS
SYSTOLIC BLOOD PRESSURE: 135 MMHG | RESPIRATION RATE: 16 BRPM | OXYGEN SATURATION: 96 % | TEMPERATURE: 97.6 F | DIASTOLIC BLOOD PRESSURE: 79 MMHG | HEART RATE: 76 BPM

## 2021-11-01 DIAGNOSIS — R11.0 NAUSEA: ICD-10-CM

## 2021-11-01 DIAGNOSIS — R10.9 LEFT FLANK PAIN: Primary | ICD-10-CM

## 2021-11-01 DIAGNOSIS — R10.30 LOWER ABDOMINAL PAIN: ICD-10-CM

## 2021-11-01 DIAGNOSIS — N90.9 IRRITATION OF EXTERNAL FEMALE GENITALIA: ICD-10-CM

## 2021-11-01 DIAGNOSIS — R10.9 LEFT SIDED ABDOMINAL PAIN: ICD-10-CM

## 2021-11-01 LAB
APPEARANCE, POC: CLEAR
BILIRUBIN, POC: NEGATIVE
COLOR, POC: YELLOW
GLUCOSE UR-MCNC: NEGATIVE MG/DL
KETONES, POC: NEGATIVE MG/DL
MUCOUS: NORMAL
NITRITE, POC: NEGATIVE
OCCULT BLOOD, POC: NEGATIVE
PH UR: 6.5 [PH] (ref 5–7)
PROT UR-MCNC: NEGATIVE MG/DL
RED BLOOD CELLS URINE: NORMAL (ref 0–3)
SP GR UR: 1.01 (ref 1–1.03)
SQUAMOUS EPITHELIAL CELLS: 0
UROBILINOGEN UR-MCNC: 0.2 MG/DL (ref 0–1)
WBC (LEUKOCYTE) ESTERASE, POC: NEGATIVE
WHITE BLOOD CELLS URINE: NORMAL (ref 0–5)

## 2021-11-01 PROCEDURE — 81015 MICROSCOPIC EXAM OF URINE: CPT | Performed by: FAMILY MEDICINE

## 2021-11-01 PROCEDURE — 99203 OFFICE O/P NEW LOW 30 MIN: CPT | Performed by: FAMILY MEDICINE

## 2021-11-01 PROCEDURE — 87086 URINE CULTURE/COLONY COUNT: CPT | Performed by: FAMILY MEDICINE

## 2021-11-01 PROCEDURE — 81002 URINALYSIS NONAUTO W/O SCOPE: CPT | Performed by: FAMILY MEDICINE

## 2021-11-01 RX ORDER — SUMATRIPTAN 25 MG/1
TABLET, FILM COATED ORAL
COMMUNITY
Start: 2021-08-30

## 2021-11-01 RX ORDER — KETOCONAZOLE 20 MG/ML
SHAMPOO TOPICAL
COMMUNITY
Start: 2021-10-25

## 2021-11-01 RX ORDER — MECLIZINE HYDROCHLORIDE 25 MG/1
TABLET ORAL
COMMUNITY
Start: 2021-09-21

## 2021-11-01 ASSESSMENT — PAIN SCALES - GENERAL: PAINLEVEL: 8

## 2021-11-02 LAB — FINAL REPORT: NORMAL

## 2021-11-18 ENCOUNTER — OFFICE VISIT (OUTPATIENT)
Dept: OTOLARYNGOLOGY | Age: 56
End: 2021-11-18

## 2021-11-18 ENCOUNTER — TELEPHONE (OUTPATIENT)
Dept: OTOLARYNGOLOGY | Age: 56
End: 2021-11-18

## 2021-11-18 VITALS — BODY MASS INDEX: 38.1 KG/M2 | WEIGHT: 189 LBS

## 2021-11-18 DIAGNOSIS — H69.92 DYSFUNCTION OF LEFT EUSTACHIAN TUBE: ICD-10-CM

## 2021-11-18 DIAGNOSIS — H60.543 ECZEMA OF EXTERNAL EAR, BILATERAL: ICD-10-CM

## 2021-11-18 DIAGNOSIS — S03.00XD DISLOCATION OF TEMPOROMANDIBULAR JOINT, SUBSEQUENT ENCOUNTER: Primary | ICD-10-CM

## 2021-11-18 PROCEDURE — 99214 OFFICE O/P EST MOD 30 MIN: CPT | Performed by: OTOLARYNGOLOGY

## 2021-11-18 RX ORDER — FLUOCINOLONE ACETONIDE 0.11 MG/ML
OIL AURICULAR (OTIC)
Qty: 1 EACH | Refills: 0 | Status: SHIPPED | OUTPATIENT
Start: 2021-11-18

## 2021-12-02 ENCOUNTER — TELEPHONE (OUTPATIENT)
Dept: NEUROLOGY | Facility: CLINIC | Age: 56
End: 2021-12-02

## 2021-12-02 NOTE — TELEPHONE ENCOUNTER
rec'd MRI of brain report from [address] dated 12/1/21; placed in provider folder for review

## 2021-12-06 ENCOUNTER — TELEPHONE (OUTPATIENT)
Dept: NEUROLOGY | Facility: CLINIC | Age: 56
End: 2021-12-06

## 2021-12-06 NOTE — TELEPHONE ENCOUNTER
I called her back, no answer, left a message to her, MRI brain is not significant, no change from 2016

## 2022-01-10 NOTE — TELEPHONE ENCOUNTER
Pt states vm was unclear, hard to decipher, please call again to discuss. Home # preferred or cell ok [phone].

## 2022-01-12 ENCOUNTER — LAB ENCOUNTER (OUTPATIENT)
Dept: LAB | Facility: HOSPITAL | Age: 57
End: 2022-01-12
Attending: INTERNAL MEDICINE
Payer: MEDICARE

## 2022-01-12 DIAGNOSIS — E05.20 TOXIC MULTINODULAR GOITER: Primary | ICD-10-CM

## 2022-01-12 DIAGNOSIS — E88.81 DYSMETABOLIC SYNDROME X: ICD-10-CM

## 2022-01-12 LAB
ALBUMIN SERPL-MCNC: 4 G/DL (ref 3.4–5)
ALBUMIN/GLOB SERPL: 1.1 {RATIO} (ref 1–2)
ALP LIVER SERPL-CCNC: 65 U/L
ALT SERPL-CCNC: 20 U/L
ANION GAP SERPL CALC-SCNC: 3 MMOL/L (ref 0–18)
AST SERPL-CCNC: 15 U/L (ref 15–37)
BILIRUB SERPL-MCNC: 0.3 MG/DL (ref 0.1–2)
BUN BLD-MCNC: 10 MG/DL (ref 7–18)
BUN/CREAT SERPL: 16.7 (ref 10–20)
CALCIUM BLD-MCNC: 9.5 MG/DL (ref 8.5–10.1)
CHLORIDE SERPL-SCNC: 105 MMOL/L (ref 98–112)
CO2 SERPL-SCNC: 32 MMOL/L (ref 21–32)
CREAT BLD-MCNC: 0.6 MG/DL
EST. AVERAGE GLUCOSE BLD GHB EST-MCNC: 131 MG/DL (ref 68–126)
FASTING STATUS PATIENT QL REPORTED: NO
GLOBULIN PLAS-MCNC: 3.5 G/DL (ref 2.8–4.4)
GLUCOSE BLD-MCNC: 102 MG/DL (ref 70–99)
HBA1C MFR BLD: 6.2 % (ref ?–5.7)
OSMOLALITY SERPL CALC.SUM OF ELEC: 289 MOSM/KG (ref 275–295)
POTASSIUM SERPL-SCNC: 4.6 MMOL/L (ref 3.5–5.1)
PROT SERPL-MCNC: 7.5 G/DL (ref 6.4–8.2)
SODIUM SERPL-SCNC: 140 MMOL/L (ref 136–145)
T3 SERPL-MCNC: 107 NG/DL (ref 60–181)
T4 FREE SERPL-MCNC: 1.1 NG/DL (ref 0.8–1.7)
TSI SER-ACNC: 0.59 MIU/ML (ref 0.36–3.74)

## 2022-01-12 PROCEDURE — 80053 COMPREHEN METABOLIC PANEL: CPT

## 2022-01-12 PROCEDURE — 84480 ASSAY TRIIODOTHYRONINE (T3): CPT

## 2022-01-12 PROCEDURE — 84439 ASSAY OF FREE THYROXINE: CPT

## 2022-01-12 PROCEDURE — 36415 COLL VENOUS BLD VENIPUNCTURE: CPT

## 2022-01-12 PROCEDURE — 84443 ASSAY THYROID STIM HORMONE: CPT

## 2022-01-12 PROCEDURE — 83036 HEMOGLOBIN GLYCOSYLATED A1C: CPT

## 2022-01-20 ENCOUNTER — HOSPITAL ENCOUNTER (OUTPATIENT)
Dept: ULTRASOUND IMAGING | Age: 57
Discharge: HOME | End: 2022-01-20
Attending: INTERNAL MEDICINE
Payer: MEDICARE

## 2022-01-20 DIAGNOSIS — E05.20 TOXIC MULTINODUL GOITER: ICD-10-CM

## 2022-01-20 PROCEDURE — 76536 US EXAM OF HEAD AND NECK: CPT | Performed by: INTERNAL MEDICINE

## 2022-02-14 ENCOUNTER — OFFICE VISIT (OUTPATIENT)
Dept: OPHTHALMOLOGY | Age: 57
End: 2022-02-14

## 2022-02-14 ENCOUNTER — LAB SERVICES (OUTPATIENT)
Dept: LAB | Age: 57
End: 2022-02-14

## 2022-02-14 DIAGNOSIS — H40.003 GLAUCOMA SUSPECT, BILATERAL: ICD-10-CM

## 2022-02-14 DIAGNOSIS — H57.12 OCULAR PAIN, LEFT EYE: Primary | ICD-10-CM

## 2022-02-14 DIAGNOSIS — H57.12 OCULAR PAIN, LEFT EYE: ICD-10-CM

## 2022-02-14 LAB
CRP SERPL-MCNC: 4.4 MG/DL (ref 0–1)
SEDIMENTATION RATE, RBC: 22 MM/H (ref 0–20)

## 2022-02-14 PROCEDURE — 86140 C-REACTIVE PROTEIN: CPT | Performed by: OPHTHALMOLOGY

## 2022-02-14 PROCEDURE — 85652 RBC SED RATE AUTOMATED: CPT | Performed by: OPHTHALMOLOGY

## 2022-02-14 PROCEDURE — 36415 COLL VENOUS BLD VENIPUNCTURE: CPT | Performed by: OPHTHALMOLOGY

## 2022-02-14 PROCEDURE — 99204 OFFICE O/P NEW MOD 45 MIN: CPT | Performed by: OPHTHALMOLOGY

## 2022-02-14 RX ORDER — PREDNISOLONE ACETATE 10 MG/ML
1 SUSPENSION/ DROPS OPHTHALMIC 4 TIMES DAILY
Qty: 5 ML | Refills: 0 | Status: SHIPPED | OUTPATIENT
Start: 2022-02-14

## 2022-02-15 ENCOUNTER — TELEPHONE (OUTPATIENT)
Dept: OPHTHALMOLOGY | Age: 57
End: 2022-02-15

## 2022-10-28 ENCOUNTER — APPOINTMENT (OUTPATIENT)
Dept: OTOLARYNGOLOGY | Age: 57
End: 2022-10-28

## 2022-11-02 ENCOUNTER — APPOINTMENT (OUTPATIENT)
Dept: OTOLARYNGOLOGY | Age: 57
End: 2022-11-02

## 2022-11-03 ENCOUNTER — OFFICE VISIT (OUTPATIENT)
Dept: AUDIOLOGY | Age: 57
End: 2022-11-03

## 2022-11-03 ENCOUNTER — OFFICE VISIT (OUTPATIENT)
Dept: OTOLARYNGOLOGY | Age: 57
End: 2022-11-03

## 2022-11-03 VITALS — HEIGHT: 59 IN | BODY MASS INDEX: 36.08 KG/M2 | WEIGHT: 179 LBS

## 2022-11-03 DIAGNOSIS — M26.629 TMJ PAIN DYSFUNCTION SYNDROME: ICD-10-CM

## 2022-11-03 DIAGNOSIS — H91.93 BILATERAL HEARING LOSS, UNSPECIFIED HEARING LOSS TYPE: Primary | ICD-10-CM

## 2022-11-03 DIAGNOSIS — H69.93 DYSFUNCTION OF BOTH EUSTACHIAN TUBES: ICD-10-CM

## 2022-11-03 DIAGNOSIS — H90.3 SENSORINEURAL HEARING LOSS, BILATERAL: Primary | ICD-10-CM

## 2022-11-03 PROCEDURE — 92567 TYMPANOMETRY: CPT | Performed by: AUDIOLOGIST

## 2022-11-03 PROCEDURE — 92556 SPEECH AUDIOMETRY COMPLETE: CPT | Performed by: AUDIOLOGIST

## 2022-11-03 PROCEDURE — 92552 PURE TONE AUDIOMETRY AIR: CPT | Performed by: AUDIOLOGIST

## 2022-11-03 PROCEDURE — 99214 OFFICE O/P EST MOD 30 MIN: CPT | Performed by: OTOLARYNGOLOGY

## 2022-11-03 RX ORDER — METHYLPREDNISOLONE 4 MG/1
4 TABLET ORAL SEE ADMIN INSTRUCTIONS
Qty: 21 TABLET | Refills: 0 | Status: SHIPPED | OUTPATIENT
Start: 2022-11-03

## 2022-11-03 RX ORDER — FEXOFENADINE HCL 180 MG/1
180 TABLET ORAL DAILY
Qty: 30 TABLET | Refills: 3 | Status: SHIPPED | OUTPATIENT
Start: 2022-11-03

## 2023-04-20 ENCOUNTER — TELEPHONE (OUTPATIENT)
Dept: GASTROENTEROLOGY | Age: 58
End: 2023-04-20

## 2023-04-25 ENCOUNTER — OFFICE VISIT (OUTPATIENT)
Dept: GASTROENTEROLOGY | Age: 58
End: 2023-04-25

## 2023-04-25 ENCOUNTER — LAB SERVICES (OUTPATIENT)
Dept: LAB | Age: 58
End: 2023-04-25

## 2023-04-25 VITALS — BODY MASS INDEX: 38.67 KG/M2 | HEIGHT: 59 IN | WEIGHT: 191.8 LBS

## 2023-04-25 DIAGNOSIS — Z12.11 SPECIAL SCREENING FOR MALIGNANT NEOPLASMS, COLON: ICD-10-CM

## 2023-04-25 DIAGNOSIS — K59.09 OTHER CONSTIPATION: ICD-10-CM

## 2023-04-25 DIAGNOSIS — R10.12 LUQ PAIN: Primary | ICD-10-CM

## 2023-04-25 DIAGNOSIS — R10.12 LUQ PAIN: ICD-10-CM

## 2023-04-25 PROBLEM — K59.00 CONSTIPATION: Status: ACTIVE | Noted: 2023-04-25

## 2023-04-25 LAB
ALBUMIN SERPL-MCNC: 3.6 G/DL (ref 3.6–5.1)
ALBUMIN/GLOB SERPL: 1 {RATIO} (ref 1–2.4)
ALP SERPL-CCNC: 69 UNITS/L (ref 45–117)
ALT SERPL-CCNC: 26 UNITS/L
ANION GAP SERPL CALC-SCNC: 11 MMOL/L (ref 7–19)
AST SERPL-CCNC: 13 UNITS/L
BASOPHILS # BLD: 0 K/MCL (ref 0–0.3)
BASOPHILS NFR BLD: 1 %
BILIRUB SERPL-MCNC: 0.2 MG/DL (ref 0.2–1)
BUN SERPL-MCNC: 8 MG/DL (ref 6–20)
BUN/CREAT SERPL: 13 (ref 7–25)
CALCIUM SERPL-MCNC: 9.4 MG/DL (ref 8.4–10.2)
CHLORIDE SERPL-SCNC: 100 MMOL/L (ref 97–110)
CO2 SERPL-SCNC: 32 MMOL/L (ref 21–32)
CREAT SERPL-MCNC: 0.6 MG/DL (ref 0.51–0.95)
DEPRECATED RDW RBC: 43.5 FL (ref 39–50)
EOSINOPHIL # BLD: 0.1 K/MCL (ref 0–0.5)
EOSINOPHIL NFR BLD: 2 %
ERYTHROCYTE [DISTWIDTH] IN BLOOD: 12.4 % (ref 11–15)
FASTING DURATION TIME PATIENT: ABNORMAL H
GFR SERPLBLD BASED ON 1.73 SQ M-ARVRAT: >90 ML/MIN
GLOBULIN SER-MCNC: 3.5 G/DL (ref 2–4)
GLUCOSE SERPL-MCNC: 105 MG/DL (ref 70–99)
HCT VFR BLD CALC: 37.9 % (ref 36–46.5)
HGB BLD-MCNC: 12.4 G/DL (ref 12–15.5)
IMM GRANULOCYTES # BLD AUTO: 0 K/MCL (ref 0–0.2)
IMM GRANULOCYTES # BLD: 0 %
LIPASE SERPL-CCNC: 52 UNITS/L (ref 73–393)
LYMPHOCYTES # BLD: 3.4 K/MCL (ref 1–4)
LYMPHOCYTES NFR BLD: 41 %
MCH RBC QN AUTO: 31 PG (ref 26–34)
MCHC RBC AUTO-ENTMCNC: 32.7 G/DL (ref 32–36.5)
MCV RBC AUTO: 94.8 FL (ref 78–100)
MONOCYTES # BLD: 0.4 K/MCL (ref 0.3–0.9)
MONOCYTES NFR BLD: 5 %
NEUTROPHILS # BLD: 4.1 K/MCL (ref 1.8–7.7)
NEUTROPHILS NFR BLD: 51 %
NRBC BLD MANUAL-RTO: 0 /100 WBC
PLATELET # BLD AUTO: 286 K/MCL (ref 140–450)
POTASSIUM SERPL-SCNC: 4.3 MMOL/L (ref 3.4–5.1)
PROT SERPL-MCNC: 7.1 G/DL (ref 6.4–8.2)
RBC # BLD: 4 MIL/MCL (ref 4–5.2)
SODIUM SERPL-SCNC: 139 MMOL/L (ref 135–145)
WBC # BLD: 8.1 K/MCL (ref 4.2–11)

## 2023-04-25 PROCEDURE — 83690 ASSAY OF LIPASE: CPT | Performed by: INTERNAL MEDICINE

## 2023-04-25 PROCEDURE — 99204 OFFICE O/P NEW MOD 45 MIN: CPT

## 2023-04-25 PROCEDURE — 80053 COMPREHEN METABOLIC PANEL: CPT | Performed by: INTERNAL MEDICINE

## 2023-04-25 PROCEDURE — 36415 COLL VENOUS BLD VENIPUNCTURE: CPT

## 2023-04-25 PROCEDURE — 85025 COMPLETE CBC W/AUTO DIFF WBC: CPT | Performed by: INTERNAL MEDICINE

## 2023-04-25 RX ORDER — BISACODYL 5 MG/1
TABLET, DELAYED RELEASE ORAL
Qty: 2 TABLET | Refills: 0 | Status: SHIPPED | OUTPATIENT
Start: 2023-04-25 | End: 2023-06-09

## 2023-04-25 RX ORDER — SIMETHICONE 125 MG
TABLET,CHEWABLE ORAL
Qty: 2 TABLET | Refills: 0 | Status: SHIPPED | OUTPATIENT
Start: 2023-04-25 | End: 2023-06-09

## 2023-04-27 ENCOUNTER — EXTERNAL RECORD (OUTPATIENT)
Dept: HEALTH INFORMATION MANAGEMENT | Facility: OTHER | Age: 58
End: 2023-04-27

## 2023-05-01 ENCOUNTER — TELEPHONE (OUTPATIENT)
Dept: GASTROENTEROLOGY | Age: 58
End: 2023-05-01

## 2023-05-03 LAB
CYTOLOGY CVX/VAG DOC THIN PREP: NORMAL
HPV16+18+45 E6+E7MRNA CVX NAA+PROBE: NEGATIVE

## 2023-05-06 PROBLEM — K59.09 OTHER CONSTIPATION: Status: ACTIVE | Noted: 2023-04-25

## 2023-05-06 ASSESSMENT — ENCOUNTER SYMPTOMS
DIARRHEA: 0
NAUSEA: 0
ABDOMINAL PAIN: 1
CHILLS: 0
TROUBLE SWALLOWING: 0
CONFUSION: 0
CHOKING: 0
VOMITING: 0
LIGHT-HEADEDNESS: 0
FATIGUE: 0
COUGH: 0
DIZZINESS: 0
ACTIVITY CHANGE: 0
SHORTNESS OF BREATH: 0
ANAL BLEEDING: 0
WEAKNESS: 0
BLOOD IN STOOL: 0
UNEXPECTED WEIGHT CHANGE: 0
COLOR CHANGE: 0
ABDOMINAL DISTENTION: 0
CONSTIPATION: 1
APPETITE CHANGE: 0
DIAPHORESIS: 0
VOICE CHANGE: 0
RECTAL PAIN: 0
FEVER: 0

## 2023-05-18 ENCOUNTER — IMAGING SERVICES (OUTPATIENT)
Dept: CT IMAGING | Age: 58
End: 2023-05-18

## 2023-05-18 ENCOUNTER — APPOINTMENT (OUTPATIENT)
Dept: CT IMAGING | Age: 58
End: 2023-05-18

## 2023-05-18 DIAGNOSIS — R10.12 LUQ PAIN: ICD-10-CM

## 2023-05-23 ENCOUNTER — TELEPHONE (OUTPATIENT)
Dept: GASTROENTEROLOGY | Age: 58
End: 2023-05-23

## 2023-05-23 DIAGNOSIS — R10.12 LUQ PAIN: Primary | ICD-10-CM

## 2023-05-23 DIAGNOSIS — Z12.11 SPECIAL SCREENING FOR MALIGNANT NEOPLASMS, COLON: ICD-10-CM

## 2023-06-11 ENCOUNTER — HOSPITAL ENCOUNTER (EMERGENCY)
Age: 58
Discharge: HOME | End: 2023-06-11
Attending: EMERGENCY MEDICINE

## 2023-06-11 ENCOUNTER — APPOINTMENT (OUTPATIENT)
Dept: CT IMAGING | Age: 58
End: 2023-06-11
Attending: EMERGENCY MEDICINE

## 2023-06-11 VITALS
SYSTOLIC BLOOD PRESSURE: 153 MMHG | BODY MASS INDEX: 38.74 KG/M2 | HEART RATE: 88 BPM | OXYGEN SATURATION: 99 % | RESPIRATION RATE: 18 BRPM | DIASTOLIC BLOOD PRESSURE: 87 MMHG | HEIGHT: 59 IN | TEMPERATURE: 97.7 F

## 2023-06-11 DIAGNOSIS — R51.9 ACUTE NONINTRACTABLE HEADACHE, UNSPECIFIED HEADACHE TYPE: Primary | ICD-10-CM

## 2023-06-11 LAB
ANION GAP SERPL CALC-SCNC: 9 MMOL/L (ref 7–19)
BASOPHILS # BLD: 0 K/MCL (ref 0–0.3)
BASOPHILS NFR BLD: 0 %
BUN SERPL-MCNC: 11 MG/DL (ref 6–20)
BUN/CREAT SERPL: 18 (ref 7–25)
CALCIUM SERPL-MCNC: 9.5 MG/DL (ref 8.4–10.2)
CHLORIDE SERPL-SCNC: 105 MMOL/L (ref 97–110)
CO2 SERPL-SCNC: 29 MMOL/L (ref 21–32)
CREAT SERPL-MCNC: 0.62 MG/DL (ref 0.51–0.95)
DEPRECATED RDW RBC: 44.7 FL (ref 39–50)
EOSINOPHIL # BLD: 0 K/MCL (ref 0–0.5)
EOSINOPHIL NFR BLD: 0 %
ERYTHROCYTE [DISTWIDTH] IN BLOOD: 12.9 % (ref 11–15)
FASTING DURATION TIME PATIENT: ABNORMAL H
GFR SERPLBLD BASED ON 1.73 SQ M-ARVRAT: >90 ML/MIN
GLUCOSE SERPL-MCNC: 148 MG/DL (ref 70–99)
HCT VFR BLD CALC: 38.9 % (ref 36–46.5)
HGB BLD-MCNC: 12.8 G/DL (ref 12–15.5)
IMM GRANULOCYTES # BLD AUTO: 0 K/MCL (ref 0–0.2)
IMM GRANULOCYTES # BLD: 0 %
LYMPHOCYTES # BLD: 1.4 K/MCL (ref 1–4)
LYMPHOCYTES NFR BLD: 14 %
MCH RBC QN AUTO: 31.2 PG (ref 26–34)
MCHC RBC AUTO-ENTMCNC: 32.9 G/DL (ref 32–36.5)
MCV RBC AUTO: 94.9 FL (ref 78–100)
MONOCYTES # BLD: 0.3 K/MCL (ref 0.3–0.9)
MONOCYTES NFR BLD: 3 %
NEUTROPHILS # BLD: 8.1 K/MCL (ref 1.8–7.7)
NEUTROPHILS NFR BLD: 83 %
NRBC BLD MANUAL-RTO: 0 /100 WBC
PLATELET # BLD AUTO: 315 K/MCL (ref 140–450)
POTASSIUM SERPL-SCNC: 3.9 MMOL/L (ref 3.4–5.1)
RBC # BLD: 4.1 MIL/MCL (ref 4–5.2)
SODIUM SERPL-SCNC: 139 MMOL/L (ref 135–145)
WBC # BLD: 9.9 K/MCL (ref 4.2–11)

## 2023-06-11 PROCEDURE — 96374 THER/PROPH/DIAG INJ IV PUSH: CPT

## 2023-06-11 PROCEDURE — G1004 CDSM NDSC: HCPCS

## 2023-06-11 PROCEDURE — 99285 EMERGENCY DEPT VISIT HI MDM: CPT

## 2023-06-11 PROCEDURE — 96375 TX/PRO/DX INJ NEW DRUG ADDON: CPT

## 2023-06-11 PROCEDURE — 10002807 HB RX 258: Performed by: EMERGENCY MEDICINE

## 2023-06-11 PROCEDURE — 80048 BASIC METABOLIC PNL TOTAL CA: CPT | Performed by: EMERGENCY MEDICINE

## 2023-06-11 PROCEDURE — 10002800 HB RX 250 W HCPCS: Performed by: EMERGENCY MEDICINE

## 2023-06-11 PROCEDURE — 70486 CT MAXILLOFACIAL W/O DYE: CPT

## 2023-06-11 PROCEDURE — 70450 CT HEAD/BRAIN W/O DYE: CPT

## 2023-06-11 PROCEDURE — 85025 COMPLETE CBC W/AUTO DIFF WBC: CPT | Performed by: EMERGENCY MEDICINE

## 2023-06-11 PROCEDURE — 96361 HYDRATE IV INFUSION ADD-ON: CPT

## 2023-06-11 PROCEDURE — 93005 ELECTROCARDIOGRAM TRACING: CPT | Performed by: EMERGENCY MEDICINE

## 2023-06-11 RX ORDER — BUTALBITAL, ACETAMINOPHEN AND CAFFEINE 50; 325; 40 MG/1; MG/1; MG/1
1 TABLET ORAL EVERY 4 HOURS PRN
Qty: 20 TABLET | Refills: 0 | Status: SHIPPED | OUTPATIENT
Start: 2023-06-11

## 2023-06-11 RX ORDER — KETOROLAC TROMETHAMINE 15 MG/ML
15 INJECTION, SOLUTION INTRAMUSCULAR; INTRAVENOUS ONCE
Status: COMPLETED | OUTPATIENT
Start: 2023-06-11 | End: 2023-06-11

## 2023-06-11 RX ORDER — METOCLOPRAMIDE HYDROCHLORIDE 5 MG/ML
10 INJECTION INTRAMUSCULAR; INTRAVENOUS ONCE
Status: COMPLETED | OUTPATIENT
Start: 2023-06-11 | End: 2023-06-11

## 2023-06-11 RX ORDER — DIPHENHYDRAMINE HYDROCHLORIDE 50 MG/ML
25 INJECTION INTRAMUSCULAR; INTRAVENOUS ONCE
Status: COMPLETED | OUTPATIENT
Start: 2023-06-11 | End: 2023-06-11

## 2023-06-11 RX ADMIN — KETOROLAC TROMETHAMINE 15 MG: 15 INJECTION, SOLUTION INTRAMUSCULAR; INTRAVENOUS at 17:34

## 2023-06-11 RX ADMIN — SODIUM CHLORIDE 1000 ML: 9 INJECTION, SOLUTION INTRAVENOUS at 17:34

## 2023-06-11 RX ADMIN — METOCLOPRAMIDE 10 MG: 5 INJECTION, SOLUTION INTRAMUSCULAR; INTRAVENOUS at 17:35

## 2023-06-11 RX ADMIN — DIPHENHYDRAMINE HYDROCHLORIDE 25 MG: 50 INJECTION, SOLUTION INTRAMUSCULAR; INTRAVENOUS at 17:35

## 2023-06-11 ASSESSMENT — ENCOUNTER SYMPTOMS
POLYPHAGIA: 0
FEVER: 0
EYE DISCHARGE: 0
POLYDIPSIA: 0
WOUND: 0
SHORTNESS OF BREATH: 0
NAUSEA: 0
EYE PAIN: 0
DIZZINESS: 0
CHILLS: 0
BRUISES/BLEEDS EASILY: 0
BACK PAIN: 0
HEADACHES: 1
SORE THROAT: 0
FACIAL SWELLING: 0
EYE REDNESS: 0
WHEEZING: 0
ACTIVITY CHANGE: 0
DIARRHEA: 0
NUMBNESS: 0
VOMITING: 0
COUGH: 0
CONFUSION: 0
COLOR CHANGE: 0
ABDOMINAL PAIN: 0

## 2023-06-11 ASSESSMENT — PAIN SCALES - GENERAL: PAINLEVEL_OUTOF10: 8

## 2023-06-12 LAB
ATRIAL RATE (BPM): 111
P AXIS (DEGREES): 62
PR-INTERVAL (MSEC): 140
QRS-INTERVAL (MSEC): 84
QT-INTERVAL (MSEC): 346
QTC: 470
R AXIS (DEGREES): 26
RAINBOW EXTRA TUBES HOLD SPECIMEN: NORMAL
REPORT TEXT: NORMAL
T AXIS (DEGREES): 34
VENTRICULAR RATE EKG/MIN (BPM): 111

## 2023-06-14 ENCOUNTER — APPOINTMENT (OUTPATIENT)
Dept: CT IMAGING | Age: 58
End: 2023-06-14

## 2023-06-14 ENCOUNTER — IMAGING SERVICES (OUTPATIENT)
Dept: CT IMAGING | Age: 58
End: 2023-06-14

## 2023-06-14 PROCEDURE — 74177 CT ABD & PELVIS W/CONTRAST: CPT | Performed by: RADIOLOGY

## 2023-06-21 ENCOUNTER — IMAGING SERVICES (OUTPATIENT)
Dept: MAMMOGRAPHY | Age: 58
End: 2023-06-21
Attending: OBSTETRICS & GYNECOLOGY

## 2023-06-21 DIAGNOSIS — Z12.31 VISIT FOR SCREENING MAMMOGRAM: ICD-10-CM

## 2023-06-21 PROCEDURE — 77063 BREAST TOMOSYNTHESIS BI: CPT | Performed by: STUDENT IN AN ORGANIZED HEALTH CARE EDUCATION/TRAINING PROGRAM

## 2023-06-21 PROCEDURE — 77067 SCR MAMMO BI INCL CAD: CPT | Performed by: STUDENT IN AN ORGANIZED HEALTH CARE EDUCATION/TRAINING PROGRAM

## 2023-06-22 ENCOUNTER — TELEPHONE (OUTPATIENT)
Dept: GASTROENTEROLOGY | Age: 58
End: 2023-06-22

## 2023-07-07 ENCOUNTER — TELEPHONE (OUTPATIENT)
Dept: ORTHOPEDICS | Age: 58
End: 2023-07-07

## 2023-07-12 ENCOUNTER — OFFICE VISIT (OUTPATIENT)
Dept: ORTHOPEDICS | Age: 58
End: 2023-07-12

## 2023-07-12 DIAGNOSIS — M16.11 PRIMARY OSTEOARTHRITIS OF RIGHT HIP: Primary | ICD-10-CM

## 2023-07-12 PROCEDURE — 20610 DRAIN/INJ JOINT/BURSA W/O US: CPT | Performed by: ORTHOPAEDIC SURGERY

## 2023-07-12 PROCEDURE — 99203 OFFICE O/P NEW LOW 30 MIN: CPT | Performed by: ORTHOPAEDIC SURGERY

## 2023-07-12 RX ADMIN — BUPIVACAINE HYDROCHLORIDE 2 ML: 5 INJECTION, SOLUTION PERINEURAL at 16:57

## 2023-07-12 RX ADMIN — TRIAMCINOLONE ACETONIDE 40 MG: 40 INJECTION, SUSPENSION INTRA-ARTICULAR; INTRAMUSCULAR at 16:57

## 2023-07-14 ENCOUNTER — TELEPHONE (OUTPATIENT)
Dept: GASTROENTEROLOGY | Age: 58
End: 2023-07-14

## 2023-07-14 ENCOUNTER — TELEPHONE (OUTPATIENT)
Dept: ORTHOPEDICS | Age: 58
End: 2023-07-14

## 2023-07-18 ENCOUNTER — TELEPHONE (OUTPATIENT)
Dept: OBGYN CLINIC | Facility: CLINIC | Age: 58
End: 2023-07-18

## 2023-07-18 NOTE — TELEPHONE ENCOUNTER
Patient was advised that Dr. Linda Choi will call to schedule earlier appointment per sister, Jeanne Moya who is a nurse at [employer]. (1) bedfast

## 2023-07-18 NOTE — TELEPHONE ENCOUNTER
Called pt and pt stated she is scheduled to see CAP as a new pt in January for fibroids. Pt stated that her daughter, Dallas Green who works at [employer], stated that [facility] told her to call our office to get pt in for sooner appt. Message to CAP to please advise where you would like for pt to be added.

## 2023-07-20 ENCOUNTER — APPOINTMENT (OUTPATIENT)
Dept: GENERAL RADIOLOGY | Age: 58
End: 2023-07-20
Attending: STUDENT IN AN ORGANIZED HEALTH CARE EDUCATION/TRAINING PROGRAM

## 2023-07-20 ENCOUNTER — HOSPITAL ENCOUNTER (EMERGENCY)
Age: 58
Discharge: HOME | End: 2023-07-20
Attending: STUDENT IN AN ORGANIZED HEALTH CARE EDUCATION/TRAINING PROGRAM

## 2023-07-20 ENCOUNTER — APPOINTMENT (OUTPATIENT)
Dept: ULTRASOUND IMAGING | Age: 58
End: 2023-07-20
Attending: STUDENT IN AN ORGANIZED HEALTH CARE EDUCATION/TRAINING PROGRAM

## 2023-07-20 VITALS
TEMPERATURE: 97.9 F | SYSTOLIC BLOOD PRESSURE: 141 MMHG | HEIGHT: 59 IN | OXYGEN SATURATION: 97 % | WEIGHT: 190.92 LBS | BODY MASS INDEX: 38.49 KG/M2 | RESPIRATION RATE: 15 BRPM | HEART RATE: 72 BPM | DIASTOLIC BLOOD PRESSURE: 86 MMHG

## 2023-07-20 DIAGNOSIS — D21.9 FIBROID: ICD-10-CM

## 2023-07-20 DIAGNOSIS — N95.0 POSTMENOPAUSAL VAGINAL BLEEDING: ICD-10-CM

## 2023-07-20 DIAGNOSIS — R07.9 CHEST PAIN, UNSPECIFIED TYPE: Primary | ICD-10-CM

## 2023-07-20 DIAGNOSIS — R93.89 ENDOMETRIAL THICKENING ON ULTRASOUND: ICD-10-CM

## 2023-07-20 LAB
ALBUMIN SERPL-MCNC: 4 G/DL (ref 3.6–5.1)
ALBUMIN/GLOB SERPL: 1 {RATIO} (ref 1–2.4)
ALP SERPL-CCNC: 72 UNITS/L (ref 45–117)
ALT SERPL-CCNC: 24 UNITS/L
ANION GAP SERPL CALC-SCNC: 7 MMOL/L (ref 7–19)
APPEARANCE UR: CLEAR
AST SERPL-CCNC: 15 UNITS/L
BACTERIA #/AREA URNS HPF: ABNORMAL /HPF
BASOPHILS # BLD: 0 K/MCL (ref 0–0.3)
BASOPHILS NFR BLD: 0 %
BILIRUB SERPL-MCNC: 0.3 MG/DL (ref 0.2–1)
BILIRUB UR QL STRIP: NEGATIVE
BUN SERPL-MCNC: 11 MG/DL (ref 6–20)
BUN/CREAT SERPL: 21 (ref 7–25)
CALCIUM SERPL-MCNC: 9.8 MG/DL (ref 8.4–10.2)
CHLORIDE SERPL-SCNC: 105 MMOL/L (ref 97–110)
CO2 SERPL-SCNC: 31 MMOL/L (ref 21–32)
COLOR UR: COLORLESS
CREAT SERPL-MCNC: 0.52 MG/DL (ref 0.51–0.95)
DEPRECATED RDW RBC: 43.6 FL (ref 39–50)
EOSINOPHIL # BLD: 0 K/MCL (ref 0–0.5)
EOSINOPHIL NFR BLD: 0 %
ERYTHROCYTE [DISTWIDTH] IN BLOOD: 12.6 % (ref 11–15)
FASTING DURATION TIME PATIENT: ABNORMAL H
GFR SERPLBLD BASED ON 1.73 SQ M-ARVRAT: >90 ML/MIN
GLOBULIN SER-MCNC: 4.2 G/DL (ref 2–4)
GLUCOSE SERPL-MCNC: 101 MG/DL (ref 70–99)
GLUCOSE UR STRIP-MCNC: NEGATIVE MG/DL
HCT VFR BLD CALC: 39 % (ref 36–46.5)
HGB BLD-MCNC: 13 G/DL (ref 12–15.5)
HGB UR QL STRIP: ABNORMAL
HYALINE CASTS #/AREA URNS LPF: ABNORMAL /LPF
IMM GRANULOCYTES # BLD AUTO: 0.1 K/MCL (ref 0–0.2)
IMM GRANULOCYTES # BLD: 1 %
KETONES UR STRIP-MCNC: NEGATIVE MG/DL
LEUKOCYTE ESTERASE UR QL STRIP: NEGATIVE
LYMPHOCYTES # BLD: 3.8 K/MCL (ref 1–4)
LYMPHOCYTES NFR BLD: 31 %
MCH RBC QN AUTO: 31.6 PG (ref 26–34)
MCHC RBC AUTO-ENTMCNC: 33.3 G/DL (ref 32–36.5)
MCV RBC AUTO: 94.7 FL (ref 78–100)
MONOCYTES # BLD: 0.6 K/MCL (ref 0.3–0.9)
MONOCYTES NFR BLD: 5 %
NEUTROPHILS # BLD: 7.8 K/MCL (ref 1.8–7.7)
NEUTROPHILS NFR BLD: 63 %
NITRITE UR QL STRIP: NEGATIVE
NRBC BLD MANUAL-RTO: 0 /100 WBC
PH UR STRIP: 6.5 [PH] (ref 5–7)
PLATELET # BLD AUTO: 296 K/MCL (ref 140–450)
POTASSIUM SERPL-SCNC: 3.7 MMOL/L (ref 3.4–5.1)
PROT SERPL-MCNC: 8.2 G/DL (ref 6.4–8.2)
PROT UR STRIP-MCNC: NEGATIVE MG/DL
RBC # BLD: 4.12 MIL/MCL (ref 4–5.2)
RBC #/AREA URNS HPF: ABNORMAL /HPF
SODIUM SERPL-SCNC: 139 MMOL/L (ref 135–145)
SP GR UR STRIP: 1.01 (ref 1–1.03)
SQUAMOUS #/AREA URNS HPF: ABNORMAL /HPF
TROPONIN I SERPL DL<=0.01 NG/ML-MCNC: <4 NG/L
UROBILINOGEN UR STRIP-MCNC: 0.2 MG/DL
WBC # BLD: 12.3 K/MCL (ref 4.2–11)
WBC #/AREA URNS HPF: ABNORMAL /HPF

## 2023-07-20 PROCEDURE — 10002803 HB RX 637: Performed by: STUDENT IN AN ORGANIZED HEALTH CARE EDUCATION/TRAINING PROGRAM

## 2023-07-20 PROCEDURE — 93005 ELECTROCARDIOGRAM TRACING: CPT | Performed by: EMERGENCY MEDICINE

## 2023-07-20 PROCEDURE — 76830 TRANSVAGINAL US NON-OB: CPT

## 2023-07-20 PROCEDURE — 71045 X-RAY EXAM CHEST 1 VIEW: CPT

## 2023-07-20 PROCEDURE — 10004651 HB RX, NO CHARGE ITEM: Performed by: STUDENT IN AN ORGANIZED HEALTH CARE EDUCATION/TRAINING PROGRAM

## 2023-07-20 PROCEDURE — 36415 COLL VENOUS BLD VENIPUNCTURE: CPT

## 2023-07-20 PROCEDURE — 81001 URINALYSIS AUTO W/SCOPE: CPT | Performed by: EMERGENCY MEDICINE

## 2023-07-20 PROCEDURE — 85025 COMPLETE CBC W/AUTO DIFF WBC: CPT | Performed by: STUDENT IN AN ORGANIZED HEALTH CARE EDUCATION/TRAINING PROGRAM

## 2023-07-20 PROCEDURE — 80053 COMPREHEN METABOLIC PANEL: CPT | Performed by: STUDENT IN AN ORGANIZED HEALTH CARE EDUCATION/TRAINING PROGRAM

## 2023-07-20 PROCEDURE — 99285 EMERGENCY DEPT VISIT HI MDM: CPT

## 2023-07-20 PROCEDURE — 84484 ASSAY OF TROPONIN QUANT: CPT | Performed by: STUDENT IN AN ORGANIZED HEALTH CARE EDUCATION/TRAINING PROGRAM

## 2023-07-20 RX ORDER — ACETAMINOPHEN 325 MG/1
325 TABLET ORAL ONCE
Status: DISCONTINUED | OUTPATIENT
Start: 2023-07-20 | End: 2023-07-20 | Stop reason: SDUPTHER

## 2023-07-20 RX ORDER — ONDANSETRON 4 MG/1
4 TABLET, ORALLY DISINTEGRATING ORAL EVERY 6 HOURS PRN
Qty: 9 TABLET | Refills: 0 | Status: SHIPPED | OUTPATIENT
Start: 2023-07-20 | End: 2023-07-23

## 2023-07-20 RX ORDER — ONDANSETRON 4 MG/1
4 TABLET, ORALLY DISINTEGRATING ORAL ONCE
Status: COMPLETED | OUTPATIENT
Start: 2023-07-20 | End: 2023-07-20

## 2023-07-20 RX ORDER — ACETAMINOPHEN 325 MG/1
650 TABLET ORAL ONCE
Status: COMPLETED | OUTPATIENT
Start: 2023-07-20 | End: 2023-07-20

## 2023-07-20 RX ADMIN — ACETAMINOPHEN 650 MG: 325 TABLET ORAL at 15:54

## 2023-07-20 RX ADMIN — ONDANSETRON 4 MG: 4 TABLET, ORALLY DISINTEGRATING ORAL at 15:54

## 2023-07-20 ASSESSMENT — HEART SCORE
AGE: GREATER THAN 45 TO LESS THAN 65
HEART SCORE: 2
RISK FACTORS: 1-2 RISK FACTORS
HISTORY: SLIGHTLY SUSPICIOUS
EKG: NORMAL
TROPONIN: EQUAL OR LESS THAN NORMAL LIMIT

## 2023-07-21 LAB
RAINBOW EXTRA TUBES HOLD SPECIMEN: NORMAL

## 2023-07-22 LAB
ATRIAL RATE (BPM): 88
P AXIS (DEGREES): 65
PR-INTERVAL (MSEC): 146
QRS-INTERVAL (MSEC): 86
QT-INTERVAL (MSEC): 354
QTC: 428
R AXIS (DEGREES): 19
REPORT TEXT: NORMAL
T AXIS (DEGREES): 31
VENTRICULAR RATE EKG/MIN (BPM): 88

## 2023-07-31 RX ORDER — BUPIVACAINE HYDROCHLORIDE 5 MG/ML
2 INJECTION, SOLUTION PERINEURAL
Status: COMPLETED | OUTPATIENT
Start: 2023-07-12 | End: 2023-07-12

## 2023-07-31 RX ORDER — TRIAMCINOLONE ACETONIDE 40 MG/ML
40 INJECTION, SUSPENSION INTRA-ARTICULAR; INTRAMUSCULAR
Status: COMPLETED | OUTPATIENT
Start: 2023-07-12 | End: 2023-07-12

## 2023-08-07 NOTE — TELEPHONE ENCOUNTER
Pt calling and stating 8/18 at 11:30 works for her unless something earlier is available. There was no slot, so I couldn't reschedule her.

## 2023-08-18 ENCOUNTER — OFFICE VISIT (OUTPATIENT)
Dept: OBGYN CLINIC | Facility: CLINIC | Age: 58
End: 2023-08-18

## 2023-08-18 VITALS
DIASTOLIC BLOOD PRESSURE: 80 MMHG | WEIGHT: 190.38 LBS | HEART RATE: 92 BPM | BODY MASS INDEX: 38 KG/M2 | SYSTOLIC BLOOD PRESSURE: 144 MMHG

## 2023-08-18 DIAGNOSIS — D25.1 FIBROIDS, INTRAMURAL: Primary | ICD-10-CM

## 2023-08-18 DIAGNOSIS — N95.0 POSTMENOPAUSAL BLEEDING: ICD-10-CM

## 2023-08-18 DIAGNOSIS — N89.8 VAGINAL MASS: ICD-10-CM

## 2023-08-18 PROCEDURE — 3079F DIAST BP 80-89 MM HG: CPT | Performed by: OBSTETRICS & GYNECOLOGY

## 2023-08-18 PROCEDURE — 3077F SYST BP >= 140 MM HG: CPT | Performed by: OBSTETRICS & GYNECOLOGY

## 2023-08-18 PROCEDURE — 99204 OFFICE O/P NEW MOD 45 MIN: CPT | Performed by: OBSTETRICS & GYNECOLOGY

## 2023-08-18 RX ORDER — ONDANSETRON 4 MG/1
TABLET, ORALLY DISINTEGRATING ORAL
COMMUNITY

## 2023-08-18 RX ORDER — ALBUTEROL SULFATE 90 UG/1
2 AEROSOL, METERED RESPIRATORY (INHALATION) EVERY 4 HOURS PRN
COMMUNITY
Start: 2023-07-28

## 2023-08-18 RX ORDER — AMOXICILLIN AND CLAVULANATE POTASSIUM 875; 125 MG/1; MG/1
1 TABLET, FILM COATED ORAL 2 TIMES DAILY
COMMUNITY
Start: 2023-07-21

## 2023-08-18 RX ORDER — TRAMADOL HYDROCHLORIDE 50 MG/1
50 TABLET ORAL EVERY 12 HOURS PRN
COMMUNITY
Start: 2023-07-27

## 2023-08-20 ENCOUNTER — OFF PREMISE (OUTPATIENT)
Dept: OTHER | Age: 58
End: 2023-08-20

## 2023-08-20 PROCEDURE — 93010 ELECTROCARDIOGRAM REPORT: CPT | Performed by: INTERNAL MEDICINE

## 2023-08-24 ENCOUNTER — TELEPHONE (OUTPATIENT)
Facility: CLINIC | Age: 58
End: 2023-08-24

## 2023-08-24 NOTE — TELEPHONE ENCOUNTER
8/24/23-Received via fax from [facility] medical records from 9/28/20, 1/12/22 and 1/20/22. Placed in folder at reception desk.

## 2023-08-25 PROCEDURE — 93018 CV STRESS TEST I&R ONLY: CPT | Performed by: INTERNAL MEDICINE

## 2023-08-25 PROCEDURE — 93016 CV STRESS TEST SUPVJ ONLY: CPT | Performed by: INTERNAL MEDICINE

## 2023-08-25 PROCEDURE — 93306 TTE W/DOPPLER COMPLETE: CPT | Performed by: INTERNAL MEDICINE

## 2023-08-25 PROCEDURE — 78452 HT MUSCLE IMAGE SPECT MULT: CPT | Performed by: INTERNAL MEDICINE

## 2023-08-31 ENCOUNTER — TELEPHONE (OUTPATIENT)
Dept: ORTHOPEDICS | Age: 58
End: 2023-08-31

## 2023-09-05 ENCOUNTER — OFFICE VISIT (OUTPATIENT)
Dept: OBGYN CLINIC | Facility: CLINIC | Age: 58
End: 2023-09-05

## 2023-09-05 VITALS — DIASTOLIC BLOOD PRESSURE: 85 MMHG | SYSTOLIC BLOOD PRESSURE: 141 MMHG | HEART RATE: 95 BPM

## 2023-09-05 DIAGNOSIS — R93.89 THICKENED ENDOMETRIUM: ICD-10-CM

## 2023-09-05 DIAGNOSIS — N84.1 MUCOUS POLYP OF CERVIX: Primary | ICD-10-CM

## 2023-09-05 PROCEDURE — 3079F DIAST BP 80-89 MM HG: CPT | Performed by: OBSTETRICS & GYNECOLOGY

## 2023-09-05 PROCEDURE — 57500 BIOPSY OF CERVIX: CPT | Performed by: OBSTETRICS & GYNECOLOGY

## 2023-09-05 PROCEDURE — 3077F SYST BP >= 140 MM HG: CPT | Performed by: OBSTETRICS & GYNECOLOGY

## 2023-09-05 NOTE — PROCEDURES
Endometrial Biopsy    Pre-Procedure Care:   Polyp was removed first- see separate procedure note- Consent was obtained. Procedure/risks were explained. Questions were answered. Correct patient was identified. Correct side and site were confirmed. Pregnancy Results: negative from  n/a  test   Birth control method(s) used:  postmenopausal    Pre-Medications: The patient was premedicated with Ibuprofen . Description of Procedure:  Under satisfactory analgesia, the patient was prepped and draped in the dorsal lithotomy position. A bivalve speculum was placed in the vagina and the cervix was prepped with Betadine solution. Single tooth tenaculum placed at the  o'clock position. The endometrial cavity was curetted for pipelle tissue sampling, 1   passes. Specimen was sent to pathology. The single tooth tenaculum was removed. Silver nitrate was applied at the site of tenaculum application   Good hemostasis was noted. There were no complications. There was no blood loss. Discharge instructions were provided to the patient. Visit Plan:  Await final pathology prior to treatment.

## 2023-09-05 NOTE — PROCEDURES
Polypectomy     Birth control method(s) used: condoms  Consent signed. Procedure discussed with the patient in detail including indication, risks, benefits, alternatives and complications. Pelvic Exam Findings:  Cervix: large irreg red fleshy polyp prolapsed through the os    Procedure:  Speculum placed in the vagina. Polyp grasped with ring forceps and twisted off base. Good hemostasis noted. Patient tolerated procedure well. Visit Plan:  Specimen sent to pathology. Pelvic rest for 1 week. Discharge instructions reviewed with patient. Pt requesting a call to schedule a TORRES consult with castro.Please contact pt to schedule.

## 2023-09-07 ENCOUNTER — ANESTHESIA EVENT (OUTPATIENT)
Dept: GASTROENTEROLOGY | Age: 58
End: 2023-09-07

## 2023-09-07 ENCOUNTER — E-ADVICE (OUTPATIENT)
Dept: GASTROENTEROLOGY | Age: 58
End: 2023-09-07

## 2023-09-07 ENCOUNTER — TELEPHONE (OUTPATIENT)
Dept: GASTROENTEROLOGY | Age: 58
End: 2023-09-07

## 2023-09-08 ENCOUNTER — TELEPHONE (OUTPATIENT)
Dept: GASTROENTEROLOGY | Age: 58
End: 2023-09-08

## 2023-09-11 ENCOUNTER — ANESTHESIA (OUTPATIENT)
Dept: GASTROENTEROLOGY | Age: 58
End: 2023-09-11

## 2023-09-11 ENCOUNTER — HOSPITAL ENCOUNTER (OUTPATIENT)
Dept: GASTROENTEROLOGY | Age: 58
Discharge: HOME | End: 2023-09-11
Attending: INTERNAL MEDICINE

## 2023-09-11 VITALS
OXYGEN SATURATION: 99 % | HEIGHT: 59 IN | SYSTOLIC BLOOD PRESSURE: 127 MMHG | TEMPERATURE: 97 F | HEART RATE: 78 BPM | BODY MASS INDEX: 39.51 KG/M2 | WEIGHT: 196 LBS | RESPIRATION RATE: 16 BRPM | DIASTOLIC BLOOD PRESSURE: 78 MMHG

## 2023-09-11 DIAGNOSIS — K63.5 POLYP OF SIGMOID COLON, UNSPECIFIED TYPE: ICD-10-CM

## 2023-09-11 DIAGNOSIS — Z12.11 SPECIAL SCREENING FOR MALIGNANT NEOPLASMS, COLON: ICD-10-CM

## 2023-09-11 DIAGNOSIS — K29.70 GASTRITIS WITHOUT BLEEDING, UNSPECIFIED CHRONICITY, UNSPECIFIED GASTRITIS TYPE: ICD-10-CM

## 2023-09-11 DIAGNOSIS — R10.12 LUQ PAIN: ICD-10-CM

## 2023-09-11 PROCEDURE — 45385 COLONOSCOPY W/LESION REMOVAL: CPT | Performed by: INTERNAL MEDICINE

## 2023-09-11 PROCEDURE — 43239 EGD BIOPSY SINGLE/MULTIPLE: CPT | Performed by: INTERNAL MEDICINE

## 2023-09-11 PROCEDURE — 43239 EGD BIOPSY SINGLE/MULTIPLE: CPT | Performed by: CLINIC/CENTER

## 2023-09-11 PROCEDURE — 45385 COLONOSCOPY W/LESION REMOVAL: CPT | Performed by: CLINIC/CENTER

## 2023-09-11 PROCEDURE — 88342 IMHCHEM/IMCYTCHM 1ST ANTB: CPT | Performed by: INTERNAL MEDICINE

## 2023-09-11 PROCEDURE — 88305 TISSUE EXAM BY PATHOLOGIST: CPT | Performed by: INTERNAL MEDICINE

## 2023-09-11 RX ORDER — SODIUM CHLORIDE 9 MG/ML
INJECTION, SOLUTION INTRAVENOUS CONTINUOUS
Status: DISCONTINUED | OUTPATIENT
Start: 2023-09-11 | End: 2023-09-13 | Stop reason: HOSPADM

## 2023-09-11 RX ORDER — SODIUM CHLORIDE, SODIUM LACTATE, POTASSIUM CHLORIDE, CALCIUM CHLORIDE 600; 310; 30; 20 MG/100ML; MG/100ML; MG/100ML; MG/100ML
INJECTION, SOLUTION INTRAVENOUS CONTINUOUS
Status: DISCONTINUED | OUTPATIENT
Start: 2023-09-11 | End: 2023-09-13 | Stop reason: HOSPADM

## 2023-09-11 RX ORDER — DEXTROSE MONOHYDRATE 50 MG/ML
INJECTION, SOLUTION INTRAVENOUS CONTINUOUS PRN
Status: DISCONTINUED | OUTPATIENT
Start: 2023-09-11 | End: 2023-09-13 | Stop reason: HOSPADM

## 2023-09-11 RX ORDER — PROPOFOL 10 MG/ML
INJECTION, EMULSION INTRAVENOUS PRN
Status: DISCONTINUED | OUTPATIENT
Start: 2023-09-11 | End: 2023-09-11

## 2023-09-11 RX ORDER — LIDOCAINE HYDROCHLORIDE 10 MG/ML
5-10 INJECTION, SOLUTION INFILTRATION; PERINEURAL PRN
Status: DISCONTINUED | OUTPATIENT
Start: 2023-09-11 | End: 2023-09-13 | Stop reason: HOSPADM

## 2023-09-11 RX ADMIN — SODIUM CHLORIDE, SODIUM LACTATE, POTASSIUM CHLORIDE, CALCIUM CHLORIDE: 600; 310; 30; 20 INJECTION, SOLUTION INTRAVENOUS at 07:04

## 2023-09-11 RX ADMIN — PROPOFOL 100 MG: 10 INJECTION, EMULSION INTRAVENOUS at 07:14

## 2023-09-11 RX ADMIN — PROPOFOL 100 MG: 10 INJECTION, EMULSION INTRAVENOUS at 07:29

## 2023-09-11 RX ADMIN — PROPOFOL 50 MG: 10 INJECTION, EMULSION INTRAVENOUS at 07:25

## 2023-09-11 RX ADMIN — PROPOFOL 100 MG: 10 INJECTION, EMULSION INTRAVENOUS at 07:20

## 2023-09-11 RX ADMIN — PROPOFOL 50 MG: 10 INJECTION, EMULSION INTRAVENOUS at 07:35

## 2023-09-11 SDOH — SOCIAL STABILITY: SOCIAL INSECURITY: RISK FACTORS: BMI> 30 (OBESITY)

## 2023-09-11 ASSESSMENT — PAIN SCALES - GENERAL
PAINLEVEL_OUTOF10: 0

## 2023-09-11 ASSESSMENT — ACTIVITIES OF DAILY LIVING (ADL)
ADL_SCORE: 12
ADL_BEFORE_ADMISSION: INDEPENDENT

## 2023-09-12 ENCOUNTER — TELEPHONE (OUTPATIENT)
Dept: GASTROENTEROLOGY | Age: 58
End: 2023-09-12

## 2023-09-14 LAB
ASR DISCLAIMER: NORMAL
CASE RPRT: NORMAL
CLINICAL INFO: NORMAL
PATH REPORT.FINAL DX SPEC: NORMAL
PATH REPORT.GROSS SPEC: NORMAL

## 2023-09-19 ENCOUNTER — CLINICAL DOCUMENTATION (OUTPATIENT)
Dept: GASTROENTEROLOGY | Age: 58
End: 2023-09-19

## 2023-09-19 LAB — COLONOSCOPY STUDY: NORMAL

## 2023-09-19 RX ORDER — PANTOPRAZOLE SODIUM 40 MG/1
40 TABLET, DELAYED RELEASE ORAL DAILY
Qty: 90 TABLET | Refills: 3 | Status: SHIPPED | OUTPATIENT
Start: 2023-09-19

## 2023-09-20 ENCOUNTER — E-ADVICE (OUTPATIENT)
Dept: GASTROENTEROLOGY | Age: 58
End: 2023-09-20

## 2023-09-20 ENCOUNTER — TELEPHONE (OUTPATIENT)
Dept: GASTROENTEROLOGY | Age: 58
End: 2023-09-20

## 2023-09-22 ENCOUNTER — MED REC SCAN ONLY (OUTPATIENT)
Facility: CLINIC | Age: 58
End: 2023-09-22

## 2023-09-27 ENCOUNTER — APPOINTMENT (OUTPATIENT)
Dept: GASTROENTEROLOGY | Age: 58
End: 2023-09-27

## 2023-09-27 ENCOUNTER — OFFICE VISIT (OUTPATIENT)
Dept: GASTROENTEROLOGY | Age: 58
End: 2023-09-27

## 2023-09-27 VITALS — WEIGHT: 200 LBS | HEIGHT: 59 IN | BODY MASS INDEX: 40.32 KG/M2

## 2023-09-27 DIAGNOSIS — E66.01 CLASS 3 SEVERE OBESITY WITHOUT SERIOUS COMORBIDITY WITH BODY MASS INDEX (BMI) OF 40.0 TO 44.9 IN ADULT, UNSPECIFIED OBESITY TYPE (CMD): ICD-10-CM

## 2023-09-27 DIAGNOSIS — K22.70 BARRETT'S ESOPHAGUS WITHOUT DYSPLASIA: Primary | ICD-10-CM

## 2023-09-27 DIAGNOSIS — R10.12 LUQ PAIN: ICD-10-CM

## 2023-09-27 PROBLEM — E66.813 CLASS 3 SEVERE OBESITY WITHOUT SERIOUS COMORBIDITY WITH BODY MASS INDEX (BMI) OF 40.0 TO 44.9 IN ADULT (CMD): Status: ACTIVE | Noted: 2023-09-27

## 2023-09-27 PROCEDURE — 99214 OFFICE O/P EST MOD 30 MIN: CPT | Performed by: INTERNAL MEDICINE

## 2023-09-27 RX ORDER — DICYCLOMINE HCL 20 MG
20 TABLET ORAL EVERY 6 HOURS PRN
Qty: 120 TABLET | Refills: 0 | Status: SHIPPED | OUTPATIENT
Start: 2023-09-27

## 2023-10-03 ASSESSMENT — ENCOUNTER SYMPTOMS
FATIGUE: 0
SPEECH DIFFICULTY: 0
APPETITE CHANGE: 0
RECTAL PAIN: 0
VOMITING: 0
CHEST TIGHTNESS: 0
CONFUSION: 0
NAUSEA: 0
ABDOMINAL DISTENTION: 1
SEIZURES: 0
BRUISES/BLEEDS EASILY: 0
ANAL BLEEDING: 0
BLOOD IN STOOL: 0
COUGH: 0
CHILLS: 0
DIAPHORESIS: 0
CONSTIPATION: 0
HEADACHES: 0
SORE THROAT: 0
BACK PAIN: 0
EYE REDNESS: 0
DIARRHEA: 0
UNEXPECTED WEIGHT CHANGE: 0
ABDOMINAL PAIN: 1
LIGHT-HEADEDNESS: 0
SHORTNESS OF BREATH: 0
TREMORS: 0
EYE PAIN: 0
CHOKING: 0
COLOR CHANGE: 0

## 2023-10-10 ENCOUNTER — OFFICE VISIT (OUTPATIENT)
Facility: CLINIC | Age: 58
End: 2023-10-10
Payer: MEDICARE

## 2023-10-10 VITALS
HEART RATE: 87 BPM | DIASTOLIC BLOOD PRESSURE: 92 MMHG | WEIGHT: 193.38 LBS | HEIGHT: 59 IN | OXYGEN SATURATION: 97 % | BODY MASS INDEX: 38.99 KG/M2 | SYSTOLIC BLOOD PRESSURE: 132 MMHG | RESPIRATION RATE: 18 BRPM

## 2023-10-10 DIAGNOSIS — E04.2 NONTOXIC MULTINODULAR GOITER: Primary | ICD-10-CM

## 2023-10-10 NOTE — PATIENT INSTRUCTIONS
Return Visit   [ x ] Physician in 1 month   [  ] In person or video visit  [  ] In person only    [ x ] After visit summary   [ x ] Placed labs/imaging. Labs are to be drawn at [address] and fasting. [ x ] Central scheduling # for ultrasound/nuclear med/CT/MRI/DXA  [  ] Directions to 1st floor lab  [  ] Med rep info for:  [  ] Dental clearance form  [  ] Will need authorization for outside records  [  ] Give blood sugar log  [  ] Other: It was great meeting you today!     Today we discussed your thyroid:  -Because you recently got IV contrast, lets delay getting your thyroid blood work until 10/30  -Please also get your thyroid US done at your earliest convenience   -Once all your labs result, we can discuss at your follow up appointment  -If anything needs further work up prior to your follow up, I will be in touch with you   - Please bring any paperwork from your previous provider to your next visit     Take care!  -Dr. Bo Caballero

## 2023-10-19 ENCOUNTER — TELEPHONE (OUTPATIENT)
Dept: GASTROENTEROLOGY | Age: 58
End: 2023-10-19

## 2023-10-20 ENCOUNTER — E-ADVICE (OUTPATIENT)
Dept: GASTROENTEROLOGY | Age: 58
End: 2023-10-20

## 2023-11-02 ENCOUNTER — E-ADVICE (OUTPATIENT)
Dept: BARIATRICS/WEIGHT MGMT | Age: 58
End: 2023-11-02

## 2023-11-21 ENCOUNTER — TELEPHONE (OUTPATIENT)
Facility: CLINIC | Age: 58
End: 2023-11-21

## 2023-11-21 DIAGNOSIS — R20.0 NUMBNESS IN LEFT LEG: ICD-10-CM

## 2023-11-21 DIAGNOSIS — E04.2 NONTOXIC MULTINODULAR GOITER: Primary | ICD-10-CM

## 2023-11-21 NOTE — TELEPHONE ENCOUNTER
Patient has been having \"issues\" and wants to make sure not diabetes    Symptom: Baby toe has been numb since summer- PCP said it was due to the shoes she was wearing, but patient has switched to [employer] shoes\" and still has no feelings. Patient had an A1C of 6.2 on 1/12/2022  Then and A1C of 5.7 on 02/10/2023  She states that she was said to have prediabetes before. Labs pended with out Dx code chosen, routed to Dr. Souleymane Rios for review. Once labs are ordered- they need to be faxed to [facility]:   LOCATION INFORMATION  [id number], [address] [place] [address] Street  Phone   [phone]  Fax   [phone]    Patient would also like call to know which lab tests were ordered and if she needs to be fasting or not.

## 2023-11-21 NOTE — TELEPHONE ENCOUNTER
RN printed lab orders and faxed to [address] [place] [address] at [phone]. Will await fax confirmation.

## 2023-11-28 ENCOUNTER — CLINICAL ABSTRACT (OUTPATIENT)
Dept: CARE COORDINATION | Age: 58
End: 2023-11-28

## 2023-12-05 ENCOUNTER — APPOINTMENT (OUTPATIENT)
Dept: GASTROENTEROLOGY | Age: 58
End: 2023-12-05

## 2023-12-06 ENCOUNTER — OFFICE VISIT (OUTPATIENT)
Dept: GASTROENTEROLOGY | Age: 58
End: 2023-12-06

## 2023-12-06 VITALS — HEIGHT: 59 IN | WEIGHT: 201 LBS | BODY MASS INDEX: 40.52 KG/M2

## 2023-12-06 DIAGNOSIS — K21.9 GERD WITHOUT ESOPHAGITIS: ICD-10-CM

## 2023-12-06 DIAGNOSIS — R10.12 LUQ ABDOMINAL PAIN: ICD-10-CM

## 2023-12-06 DIAGNOSIS — K29.60 OTHER GASTRITIS WITHOUT HEMORRHAGE, UNSPECIFIED CHRONICITY: ICD-10-CM

## 2023-12-06 DIAGNOSIS — K57.30 DIVERTICULOSIS OF LARGE INTESTINE WITHOUT PERFORATION OR ABSCESS WITHOUT BLEEDING: ICD-10-CM

## 2023-12-06 DIAGNOSIS — K22.70 BARRETT'S ESOPHAGUS WITHOUT DYSPLASIA: Primary | ICD-10-CM

## 2023-12-06 DIAGNOSIS — Z86.010 PERSONAL HISTORY OF COLONIC POLYPS: ICD-10-CM

## 2023-12-06 DIAGNOSIS — K59.09 OTHER CONSTIPATION: ICD-10-CM

## 2023-12-06 PROBLEM — Z86.0100 PERSONAL HISTORY OF COLONIC POLYPS: Status: ACTIVE | Noted: 2023-12-06

## 2023-12-06 PROCEDURE — 99213 OFFICE O/P EST LOW 20 MIN: CPT

## 2023-12-06 RX ORDER — OMEPRAZOLE 40 MG/1
40 CAPSULE, DELAYED RELEASE ORAL DAILY
Qty: 30 CAPSULE | Refills: 5 | Status: SHIPPED | OUTPATIENT
Start: 2023-12-06 | End: 2024-06-03

## 2023-12-15 PROBLEM — E53.8 B12 DEFICIENCY: Status: ACTIVE | Noted: 2023-09-14

## 2023-12-15 PROBLEM — K22.70 BARRETT'S ESOPHAGUS WITHOUT DYSPLASIA: Status: ACTIVE | Noted: 2023-09-27

## 2023-12-15 PROBLEM — I10 ESSENTIAL HYPERTENSION: Status: ACTIVE | Noted: 2022-08-26

## 2023-12-15 PROBLEM — M16.10 HIP ARTHRITIS: Status: ACTIVE | Noted: 2022-08-26

## 2023-12-15 PROBLEM — R73.03 PREDIABETES: Status: ACTIVE | Noted: 2022-12-28

## 2023-12-15 PROBLEM — K59.09 OTHER CONSTIPATION: Status: ACTIVE | Noted: 2023-04-25

## 2023-12-15 PROBLEM — M16.11 PRIMARY OSTEOARTHRITIS OF RIGHT HIP: Status: ACTIVE | Noted: 2023-07-12

## 2023-12-15 PROBLEM — R06.2 WHEEZING: Status: ACTIVE | Noted: 2023-12-15

## 2023-12-15 PROBLEM — R94.6 ABNORMAL RESULTS OF THYROID FUNCTION STUDIES: Status: ACTIVE | Noted: 2023-09-14

## 2023-12-15 PROBLEM — H65.90 SEROUS OTITIS MEDIA: Status: ACTIVE | Noted: 2023-12-15

## 2023-12-15 PROBLEM — K57.30 DIVERTICULOSIS OF LARGE INTESTINE WITHOUT PERFORATION OR ABSCESS WITHOUT BLEEDING: Status: ACTIVE | Noted: 2023-12-06

## 2023-12-17 PROBLEM — K29.70 GASTRITIS: Status: ACTIVE | Noted: 2023-12-17

## 2023-12-17 ASSESSMENT — ENCOUNTER SYMPTOMS
TROUBLE SWALLOWING: 1
WEAKNESS: 0
ROS GI COMMENTS: BELCHING
COLOR CHANGE: 0
FATIGUE: 0
APPETITE CHANGE: 0
VOMITING: 0
NAUSEA: 0
UNEXPECTED WEIGHT CHANGE: 0
FEVER: 0
DIAPHORESIS: 0
ANAL BLEEDING: 0
BLOOD IN STOOL: 0
CONFUSION: 0
ABDOMINAL PAIN: 1
DIARRHEA: 0
CONSTIPATION: 0
RECTAL PAIN: 0
DIZZINESS: 0
LIGHT-HEADEDNESS: 0
VOICE CHANGE: 0
SHORTNESS OF BREATH: 0
ABDOMINAL DISTENTION: 0
ACTIVITY CHANGE: 0
COUGH: 0
CHILLS: 0

## 2023-12-23 LAB
T3, TOTAL: 121 NG/DL (ref 76–181)
T4, FREE: 1 NG/DL (ref 0.8–1.8)
TSH: 0.53 MIU/L (ref 0.4–4.5)

## 2023-12-26 ENCOUNTER — TELEPHONE (OUTPATIENT)
Facility: CLINIC | Age: 58
End: 2023-12-26

## 2023-12-26 NOTE — TELEPHONE ENCOUNTER
Received fax from [employer] [place] [employer] with thyroid results for patient. Routed and placed in Dr. Maci Bailey for review.

## 2023-12-26 NOTE — TELEPHONE ENCOUNTER
Reviewed labs, they are in the normal range. She was supposed to get a thyroid US 10/31/2023. Would you be able to remind her to get this done and schedule a follow up visit with me after to discuss results and thyroid testing. Thanks!

## 2023-12-28 ENCOUNTER — MED REC SCAN ONLY (OUTPATIENT)
Facility: CLINIC | Age: 58
End: 2023-12-28

## 2024-01-20 ENCOUNTER — APPOINTMENT (OUTPATIENT)
Dept: CT IMAGING | Facility: HOSPITAL | Age: 59
End: 2024-01-20
Attending: EMERGENCY MEDICINE
Payer: MEDICARE

## 2024-01-20 ENCOUNTER — HOSPITAL ENCOUNTER (EMERGENCY)
Facility: HOSPITAL | Age: 59
Discharge: HOME | End: 2024-01-20
Attending: EMERGENCY MEDICINE
Payer: MEDICARE

## 2024-01-20 VITALS
RESPIRATION RATE: 15 BRPM | DIASTOLIC BLOOD PRESSURE: 60 MMHG | HEART RATE: 77 BPM | BODY MASS INDEX: 40.72 KG/M2 | WEIGHT: 202 LBS | TEMPERATURE: 99 F | SYSTOLIC BLOOD PRESSURE: 106 MMHG | OXYGEN SATURATION: 100 % | HEIGHT: 59 IN

## 2024-01-20 DIAGNOSIS — K59.00 CONSTIPATION, UNSPECIFIED CONSTIPATION TYPE: ICD-10-CM

## 2024-01-20 DIAGNOSIS — R10.9 ABDOMINAL PAIN OF UNKNOWN ETIOLOGY: Primary | ICD-10-CM

## 2024-01-20 LAB
ALBUMIN SERPL-MCNC: 4 G/DL (ref 3.4–5)
ALBUMIN/GLOB SERPL: 1 {RATIO} (ref 1–2)
ALP LIVER SERPL-CCNC: 77 U/L
ANION GAP SERPL CALC-SCNC: 4 MMOL/L (ref 0–18)
AST SERPL-CCNC: 12 U/L (ref 15–37)
BASOPHILS # BLD AUTO: 0.03 X10(3) UL (ref 0–0.2)
BASOPHILS NFR BLD AUTO: 0.3 %
BILIRUB SERPL-MCNC: 0.2 MG/DL (ref 0.1–2)
BILIRUB UR QL STRIP.AUTO: NEGATIVE
BUN BLD-MCNC: 9 MG/DL (ref 9–23)
CALCIUM BLD-MCNC: 9.5 MG/DL (ref 8.5–10.1)
CHLORIDE SERPL-SCNC: 103 MMOL/L (ref 98–112)
CLARITY UR REFRACT.AUTO: CLEAR
CO2 SERPL-SCNC: 32 MMOL/L (ref 21–32)
COLOR UR AUTO: COLORLESS
CREAT BLD-MCNC: 0.64 MG/DL
EGFRCR SERPLBLD CKD-EPI 2021: 102 ML/MIN/1.73M2 (ref 60–?)
EOSINOPHIL # BLD AUTO: 0.11 X10(3) UL (ref 0–0.7)
EOSINOPHIL NFR BLD AUTO: 1.2 %
ERYTHROCYTE [DISTWIDTH] IN BLOOD BY AUTOMATED COUNT: 12.4 %
GLOBULIN PLAS-MCNC: 4 G/DL (ref 2.8–4.4)
GLUCOSE BLD-MCNC: 111 MG/DL (ref 70–99)
GLUCOSE UR STRIP.AUTO-MCNC: NORMAL MG/DL
HCT VFR BLD AUTO: 38.6 %
HGB BLD-MCNC: 12.9 G/DL
IMM GRANULOCYTES # BLD AUTO: 0.03 X10(3) UL (ref 0–1)
IMM GRANULOCYTES NFR BLD: 0.3 %
KETONES UR STRIP.AUTO-MCNC: NEGATIVE MG/DL
LEUKOCYTE ESTERASE UR QL STRIP.AUTO: NEGATIVE
LIPASE SERPL-CCNC: 15 U/L (ref 13–75)
LYMPHOCYTES # BLD AUTO: 3.66 X10(3) UL (ref 1–4)
LYMPHOCYTES NFR BLD AUTO: 39 %
MCH RBC QN AUTO: 30.4 PG (ref 26–34)
MCHC RBC AUTO-ENTMCNC: 33.4 G/DL (ref 31–37)
MCV RBC AUTO: 91 FL
MONOCYTES # BLD AUTO: 0.51 X10(3) UL (ref 0.1–1)
MONOCYTES NFR BLD AUTO: 5.4 %
NEUTROPHILS # BLD AUTO: 5.04 X10 (3) UL (ref 1.5–7.7)
NEUTROPHILS # BLD AUTO: 5.04 X10(3) UL (ref 1.5–7.7)
NEUTROPHILS NFR BLD AUTO: 53.8 %
NITRITE UR QL STRIP.AUTO: NEGATIVE
OSMOLALITY SERPL CALC.SUM OF ELEC: 287 MOSM/KG (ref 275–295)
PH UR STRIP.AUTO: 6.5 [PH] (ref 5–8)
PLATELET # BLD AUTO: 277 10(3)UL (ref 150–450)
POTASSIUM SERPL-SCNC: 3.8 MMOL/L (ref 3.5–5.1)
PROT SERPL-MCNC: 8 G/DL (ref 6.4–8.2)
PROT UR STRIP.AUTO-MCNC: NEGATIVE MG/DL
RBC # BLD AUTO: 4.24 X10(6)UL
RBC UR QL AUTO: NEGATIVE
SODIUM SERPL-SCNC: 139 MMOL/L (ref 136–145)
SP GR UR STRIP.AUTO: <1.005 (ref 1–1.03)
UROBILINOGEN UR STRIP.AUTO-MCNC: NORMAL MG/DL
WBC # BLD AUTO: 9.4 X10(3) UL (ref 4–11)

## 2024-01-20 PROCEDURE — 80053 COMPREHEN METABOLIC PANEL: CPT

## 2024-01-20 PROCEDURE — 80053 COMPREHEN METABOLIC PANEL: CPT | Performed by: EMERGENCY MEDICINE

## 2024-01-20 PROCEDURE — 96374 THER/PROPH/DIAG INJ IV PUSH: CPT

## 2024-01-20 PROCEDURE — 99285 EMERGENCY DEPT VISIT HI MDM: CPT

## 2024-01-20 PROCEDURE — 83690 ASSAY OF LIPASE: CPT

## 2024-01-20 PROCEDURE — 85025 COMPLETE CBC W/AUTO DIFF WBC: CPT

## 2024-01-20 PROCEDURE — 96361 HYDRATE IV INFUSION ADD-ON: CPT

## 2024-01-20 PROCEDURE — 81003 URINALYSIS AUTO W/O SCOPE: CPT | Performed by: EMERGENCY MEDICINE

## 2024-01-20 PROCEDURE — 83690 ASSAY OF LIPASE: CPT | Performed by: EMERGENCY MEDICINE

## 2024-01-20 PROCEDURE — 85025 COMPLETE CBC W/AUTO DIFF WBC: CPT | Performed by: EMERGENCY MEDICINE

## 2024-01-20 PROCEDURE — 81003 URINALYSIS AUTO W/O SCOPE: CPT

## 2024-01-20 PROCEDURE — 99284 EMERGENCY DEPT VISIT MOD MDM: CPT

## 2024-01-20 PROCEDURE — 96375 TX/PRO/DX INJ NEW DRUG ADDON: CPT

## 2024-01-20 PROCEDURE — 74177 CT ABD & PELVIS W/CONTRAST: CPT | Performed by: EMERGENCY MEDICINE

## 2024-01-20 RX ORDER — SODIUM CHLORIDE 9 MG/ML
125 INJECTION, SOLUTION INTRAVENOUS CONTINUOUS
Status: DISCONTINUED | OUTPATIENT
Start: 2024-01-20 | End: 2024-01-21

## 2024-01-20 RX ORDER — METOCLOPRAMIDE 10 MG/1
10 TABLET ORAL 3 TIMES DAILY PRN
Qty: 20 TABLET | Refills: 0 | Status: SHIPPED | OUTPATIENT
Start: 2024-01-20 | End: 2024-01-20 | Stop reason: CLARIF

## 2024-01-20 RX ORDER — HYDROMORPHONE HYDROCHLORIDE 1 MG/ML
0.5 INJECTION, SOLUTION INTRAMUSCULAR; INTRAVENOUS; SUBCUTANEOUS EVERY 30 MIN PRN
Status: DISCONTINUED | OUTPATIENT
Start: 2024-01-20 | End: 2024-01-21

## 2024-01-20 RX ORDER — DICYCLOMINE HCL 20 MG
20 TABLET ORAL 4 TIMES DAILY PRN
Qty: 30 TABLET | Refills: 0 | Status: SHIPPED | OUTPATIENT
Start: 2024-01-20 | End: 2024-02-19

## 2024-01-20 RX ORDER — IOHEXOL 350 MG/ML
100 INJECTION, SOLUTION INTRAVENOUS
Status: COMPLETED | OUTPATIENT
Start: 2024-01-20 | End: 2024-01-20

## 2024-01-20 RX ORDER — KETOROLAC TROMETHAMINE 15 MG/ML
15 INJECTION, SOLUTION INTRAMUSCULAR; INTRAVENOUS ONCE
Status: COMPLETED | OUTPATIENT
Start: 2024-01-20 | End: 2024-01-20

## 2024-01-20 RX ORDER — ONDANSETRON 2 MG/ML
4 INJECTION INTRAMUSCULAR; INTRAVENOUS ONCE
Status: COMPLETED | OUTPATIENT
Start: 2024-01-20 | End: 2024-01-20

## 2024-01-20 RX ORDER — TRAMADOL HYDROCHLORIDE 50 MG/1
TABLET ORAL EVERY 6 HOURS PRN
Qty: 10 TABLET | Refills: 0 | Status: SHIPPED | OUTPATIENT
Start: 2024-01-20 | End: 2024-01-20 | Stop reason: CLARIF

## 2024-01-20 NOTE — ED INITIAL ASSESSMENT (HPI)
To the ED via walk-in with c/o abd pain for three days. States it has been off and on but has gotten worse today. Feels like its' a sharp pain in her left side/flank. Feels like it's burning and nagging. Radiates to her back. Pooping makings pain worse. To have a EGD but had a hip replacement recently. Follows Dr. Valencia.     Hip surgery on 1.30.24 coming up. EGD delayed until May per Dr. Valencia.

## 2024-01-21 NOTE — ED QUICK NOTES
Received bedside report from Ned FRANCO. Pt aware that we are waiting to go to CT. Aware that she needs to stay NPO. Family at bedside.

## 2024-01-21 NOTE — DISCHARGE INSTRUCTIONS
Follow-up for further evaluation with primary physician and GI as discussed.  Plenty of fluids.  May use MiraLAX or magnesium citrate as discussed.  May use Bentyl for pain.  May use Tylenol first.  Bentyl as needed.  If taking ibuprofen recommend take Prilosec over-the-counter and take it with food.  Increase fiber in the diet.

## 2024-01-21 NOTE — ED PROVIDER NOTES
Patient Seen in: [facility] Emergency Department      History     Chief Complaint   Patient presents with    Abdomen/Flank Pain     Stated Complaint: abd pain x3 days    Subjective:   HPI    Patient is a 58-year-old female who states she has had abdominal pain on and off for 10 to 15 years.  Patient states pain is worsened over the last several days.  Patient states pain is left upper quadrant left flank.  Patient states it is sharp constant 7 out of 10.  Patient states it is worse when she has a bowel movement.  Patient denies vomiting, no fevers or chills, no diarrhea.  Patient is tried over-the-counter pain medicines which may remain out of helped.  Patient denies any blood in her stool, no hematuria dysuria.  Patient states she had imaging ordered of her abdomen through her GI but has not been able to get until February.  Patient came in for further evaluation.  Remainder of review of systems negative.    Objective:   Past Medical History:   Diagnosis Date    Abdominal distention     Abdominal pain     Abnormal uterine bleeding 2015    Amenorrhea 2014-2015    Anxiety state, unspecified     Arthritis     Back pain     Belching     Bloating     Blurred vision     Chronic cough     Constipation     Diabetes mellitus (HCC)     pre diabetic    Dyspareunia     Easy bruising     H/O mammogram 10/01/2014    benign pt stated    H/O mammogram 2014    normal    High cholesterol     Hoarseness, chronic     Leaking of urine     Obstructive sleep apnea (adult) (pediatric) [place] PSG 17    AHI 5.3 SaO2 cole 81 % autoPAP 6-16 HME    Pain in joints     Pap smear for cervical cancer screening 10/01/2014    wnl pt stated    Pap smear for cervical cancer screening 2014    wnl, neg hpv    Sleep disturbance     Stress     Uncomfortable fullness after meals     Wears glasses     Weight gain               Past Surgical History:   Procedure Laterality Date      ,     CHOLECYSTECTOMY       LAP ADJUSTABLE GASTRIC BAND  2004    Had removed in 2005    OOPHORECTOMY  2000    OTHER SURGICAL HISTORY  3/2015    benign    REMOVAL GALLBLADDER      TUBAL LIGATION  2000                Social History     Socioeconomic History    Marital status:    Tobacco Use    Smoking status: Never    Smokeless tobacco: Never   Vaping Use    Vaping Use: Never used   Substance and Sexual Activity    Alcohol use: No     Alcohol/week: 0.0 standard drinks of alcohol    Drug use: No    Sexual activity: Not Currently     Partners: Male   Other Topics Concern    Caffeine Concern Yes     Comment: tea occ    Exercise Yes     Comment: trys to walk on treadmill, biking              Review of Systems    Positive for stated complaint: abd pain x3 days  Other systems are as noted in HPI.  Constitutional and vital signs reviewed.      All other systems reviewed and negative except as noted above.    Physical Exam     ED Triage Vitals [01/20/24 1717]   BP (!) 176/101   Pulse 94   Resp 22   Temp 98.6 °F (37 °C)   Temp src Temporal   SpO2 98 %   O2 Device None (Room air)       Current:/60   Pulse 77   Temp 98.6 °F (37 °C) (Temporal)   Resp 15   Ht 149.9 cm (4' 11\")   Wt 91.6 kg   SpO2 100%   BMI 40.80 kg/m²         Physical Exam  GENERAL: Patient resting on the cart in no acute distress.  HEENT: Extraocular muscles intact, pupils equal round reactive to light, neck supple, no meningismus.  LUNGS: Lungs clear to auscultation bilaterally.  CARDIOVASCULAR: + S1-S2, regular rate and rhythm, no murmurs.  BACK: No CVA tenderness, no midline bony tenderness.  ABDOMEN: + Bowel sounds, soft, mild tenderness left upper quadrant left flank, nondistended.  No rebound, no guarding, no hepatosplenomegaly.  EXTREMITIES: Full range of motion, no tenderness, good capillary refill.  SKIN: No rash, good turgor.  NEURO: Patient answers questions appropriately.  No focal deficits appreciated.           ED Course     Labs Reviewed   COMP  METABOLIC PANEL (14) - Abnormal; Notable for the following components:       Result Value    Glucose 111 (*)     AST 12 (*)     All other components within normal limits   URINALYSIS, ROUTINE - Abnormal; Notable for the following components:    Urine Color Colorless (*)     Spec Gravity <1.005 (*)     All other components within normal limits   LIPASE - Normal   CBC WITH DIFFERENTIAL WITH PLATELET    Narrative:     The following orders were created for panel order CBC With Differential With Platelet.  Procedure                               Abnormality         Status                     ---------                               -----------         ------                     CBC W/ DIFFERENTIAL[[id number]]                              Final result                 Please view results for these tests on the individual orders.   RAINBOW DRAW LAVENDER   RAINBOW DRAW LIGHT GREEN   RAINBOW DRAW GOLD   RAINBOW DRAW BLUE   CBC W/ DIFFERENTIAL             CT abdomen pelvis Normal caliber appendix.      Extensive stool throughout the colon consistent with constipation.   Independent reviewed by myself, no free air         MDM      Patient given IV fluids.  Patient with Toradol.  Patient did request something more for pain was given additional pain medicines.  Patient did have a CT showing extensive stool throughout the colon.  No evidence of infection.  Patient had normal white count.  Patient felt comfortable going home.  Patient is advised to take MiraLAX or magnesium citrate as discussed.  May use Bentyl as needed.  If taking ibuprofen recommend take it with food and take Prilosec.  Follow-up for further evaluation primary physician as well as GI doctor.  Call for appointment.  Return if fever get 100.3, increased pain, new or worse symptoms.  I did consider constipation, diverticulitis.                                 Medical Decision Making      Disposition and Plan     Clinical Impression:  1. Abdominal pain of unknown  etiology    2. Constipation, unspecified constipation type         Disposition:  Discharge  1/20/2024  8:28 pm    Follow-up:  Michael Gerardo MD  [address]  [facility] [id number] [phone]    Follow up in 2 day(s)      Lc Valencia MD  [address] [place] [id number] [phone]    Follow up in 2 day(s)            Medications Prescribed:  Current Discharge Medication List        START taking these medications    Details   dicyclomine 20 MG Oral Tab Take 1 tablet (20 mg total) by mouth 4 (four) times daily as needed.  Qty: 30 tablet, Refills: 0

## 2024-01-25 ENCOUNTER — TELEPHONE (OUTPATIENT)
Facility: CLINIC | Age: 59
End: 2024-01-25

## 2024-01-25 NOTE — TELEPHONE ENCOUNTER
Pt phoned into clinic stating that she has been unable to schedule her thyroid US d/t scheduling conflicts. States that she is having hip surgery and will be in recovery which will further delay her ability to have US completed. States will aim to have the US done \"later in the spring\".

## 2024-04-04 ENCOUNTER — HOSPITAL ENCOUNTER (EMERGENCY)
Facility: HOSPITAL | Age: 59
Discharge: HOME | End: 2024-04-04
Attending: EMERGENCY MEDICINE
Payer: MEDICARE

## 2024-04-04 ENCOUNTER — APPOINTMENT (OUTPATIENT)
Dept: GENERAL RADIOLOGY | Facility: HOSPITAL | Age: 59
End: 2024-04-04
Attending: EMERGENCY MEDICINE
Payer: MEDICARE

## 2024-04-04 VITALS
DIASTOLIC BLOOD PRESSURE: 74 MMHG | TEMPERATURE: 98 F | RESPIRATION RATE: 16 BRPM | WEIGHT: 200 LBS | BODY MASS INDEX: 40.32 KG/M2 | HEART RATE: 86 BPM | SYSTOLIC BLOOD PRESSURE: 152 MMHG | HEIGHT: 59 IN | OXYGEN SATURATION: 100 %

## 2024-04-04 DIAGNOSIS — G89.18 POST-OP PAIN: Primary | ICD-10-CM

## 2024-04-04 LAB
ALBUMIN SERPL-MCNC: 3.7 G/DL (ref 3.4–5)
ALBUMIN/GLOB SERPL: 0.9 {RATIO} (ref 1–2)
ALP LIVER SERPL-CCNC: 81 U/L
ALT SERPL-CCNC: 20 U/L
ANION GAP SERPL CALC-SCNC: 5 MMOL/L (ref 0–18)
AST SERPL-CCNC: 16 U/L (ref 15–37)
BASOPHILS # BLD AUTO: 0.03 X10(3) UL (ref 0–0.2)
BASOPHILS NFR BLD AUTO: 0.4 %
BILIRUB SERPL-MCNC: 0.2 MG/DL (ref 0.1–2)
BUN BLD-MCNC: 10 MG/DL (ref 9–23)
CALCIUM BLD-MCNC: 10 MG/DL (ref 8.5–10.1)
CHLORIDE SERPL-SCNC: 106 MMOL/L (ref 98–112)
CO2 SERPL-SCNC: 30 MMOL/L (ref 21–32)
CREAT BLD-MCNC: 0.62 MG/DL
EGFRCR SERPLBLD CKD-EPI 2021: 103 ML/MIN/1.73M2 (ref 60–?)
EOSINOPHIL # BLD AUTO: 0.08 X10(3) UL (ref 0–0.7)
EOSINOPHIL NFR BLD AUTO: 1 %
ERYTHROCYTE [DISTWIDTH] IN BLOOD BY AUTOMATED COUNT: 12.2 %
GLOBULIN PLAS-MCNC: 4 G/DL (ref 2.8–4.4)
GLUCOSE BLD-MCNC: 104 MG/DL (ref 70–99)
HCT VFR BLD AUTO: 35.9 %
HGB BLD-MCNC: 12.2 G/DL
IMM GRANULOCYTES # BLD AUTO: 0.02 X10(3) UL (ref 0–1)
IMM GRANULOCYTES NFR BLD: 0.3 %
LYMPHOCYTES # BLD AUTO: 3.33 X10(3) UL (ref 1–4)
LYMPHOCYTES NFR BLD AUTO: 42.7 %
MCH RBC QN AUTO: 30.9 PG (ref 26–34)
MCHC RBC AUTO-ENTMCNC: 34 G/DL (ref 31–37)
MCV RBC AUTO: 90.9 FL
MONOCYTES # BLD AUTO: 0.46 X10(3) UL (ref 0.1–1)
MONOCYTES NFR BLD AUTO: 5.9 %
NEUTROPHILS # BLD AUTO: 3.88 X10 (3) UL (ref 1.5–7.7)
NEUTROPHILS # BLD AUTO: 3.88 X10(3) UL (ref 1.5–7.7)
NEUTROPHILS NFR BLD AUTO: 49.7 %
OSMOLALITY SERPL CALC.SUM OF ELEC: 291 MOSM/KG (ref 275–295)
PLATELET # BLD AUTO: 236 10(3)UL (ref 150–450)
POTASSIUM SERPL-SCNC: 3.7 MMOL/L (ref 3.5–5.1)
PROT SERPL-MCNC: 7.7 G/DL (ref 6.4–8.2)
RBC # BLD AUTO: 3.95 X10(6)UL
SODIUM SERPL-SCNC: 141 MMOL/L (ref 136–145)
WBC # BLD AUTO: 7.8 X10(3) UL (ref 4–11)

## 2024-04-04 PROCEDURE — 36415 COLL VENOUS BLD VENIPUNCTURE: CPT

## 2024-04-04 PROCEDURE — 85025 COMPLETE CBC W/AUTO DIFF WBC: CPT | Performed by: EMERGENCY MEDICINE

## 2024-04-04 PROCEDURE — 99284 EMERGENCY DEPT VISIT MOD MDM: CPT

## 2024-04-04 PROCEDURE — 73630 X-RAY EXAM OF FOOT: CPT | Performed by: EMERGENCY MEDICINE

## 2024-04-04 PROCEDURE — 80053 COMPREHEN METABOLIC PANEL: CPT | Performed by: EMERGENCY MEDICINE

## 2024-04-04 RX ORDER — NAPROXEN 500 MG/1
500 TABLET ORAL 2 TIMES DAILY PRN
Qty: 20 TABLET | Refills: 0 | Status: SHIPPED | OUTPATIENT
Start: 2024-04-04 | End: 2024-04-14

## 2024-04-05 NOTE — ED PROVIDER NOTES
Patient Seen in: [facility] Emergency Department      History     Chief Complaint   Patient presents with    Wound Recheck     Stated Complaint: sore on foot, pain    Subjective:   HPI    58-year-old female who had a \"corn\" on her left foot had a procedure to have it removed earlier this week is complaining of having pain to this area.  She came for a check.  It hurts to walk on it.  She denies any numbness or weakness pressures no fevers or chills.  She is no pus drainage.  She is no other complaints.    Objective:   Past Medical History:   Diagnosis Date    Abdominal distention     Abdominal pain     Abnormal uterine bleeding 2015    Amenorrhea 2014-2015    Anxiety state, unspecified     Arthritis     Back pain     Belching     Bloating     Blurred vision     Chronic cough     Constipation     Diabetes mellitus (HCC)     pre diabetic    Dyspareunia     Easy bruising     H/O mammogram 10/01/2014    benign pt stated    H/O mammogram 2014    normal    High cholesterol     Hoarseness, chronic     Leaking of urine     Obstructive sleep apnea (adult) (pediatric) [place] PSG 17    AHI 5.3 SaO2 cole 81 % autoPAP 6-16 HME    Pain in joints     Pap smear for cervical cancer screening 10/01/2014    wnl pt stated    Pap smear for cervical cancer screening 2014    wnl, neg hpv    Pulmonary embolism (HCC) 2024    Sleep disturbance     Stress     Uncomfortable fullness after meals     Wears glasses     Weight gain               Past Surgical History:   Procedure Laterality Date      ,     CHOLECYSTECTOMY      LAP ADJUSTABLE GASTRIC BAND  2004    Had removed in     OOPHORECTOMY  2000    OTHER SURGICAL HISTORY  2015    benign    OTHER SURGICAL HISTORY Right     R hip replacement    REMOVAL GALLBLADDER      TUBAL LIGATION                  Social History     Socioeconomic History    Marital status:    Tobacco Use    Smoking status: Never    Smokeless  tobacco: Never   Vaping Use    Vaping Use: Never used   Substance and Sexual Activity    Alcohol use: No     Alcohol/week: 0.0 standard drinks of alcohol    Drug use: No    Sexual activity: Not Currently     Partners: Male   Other Topics Concern    Caffeine Concern Yes     Comment: tea occ    Exercise Yes     Comment: trys to walk on treadmill, biking              Review of Systems    Positive for stated complaint: sore on foot, pain  Other systems are as noted in HPI.  Constitutional and vital signs reviewed.      All other systems reviewed and negative except as noted above.    Physical Exam     ED Triage Vitals [04/04/24 1939]   /83   Pulse 85   Resp 18   Temp 98 °F (36.7 °C)   Temp src Oral   SpO2 97 %   O2 Device None (Room air)       Current:/74   Pulse 79   Temp 98 °F (36.7 °C) (Oral)   Resp 16   Ht 149.9 cm (4' 11\")   Wt 90.7 kg   LMP 03/28/2015 (Approximate)   SpO2 99%   BMI 40.40 kg/m²         Physical Exam    Extremity wound to the bottom of the foot shows no other significant erythema, pus, drainage, induration or fluctuance there is some tenderness with palpation noted.  He is otherwise neurovascularly intact.    ED Course     Labs Reviewed   COMP METABOLIC PANEL (14) - Abnormal; Notable for the following components:       Result Value    Glucose 104 (*)     A/G Ratio 0.9 (*)     All other components within normal limits   CBC WITH DIFFERENTIAL WITH PLATELET    Narrative:     The following orders were created for panel order CBC With Differential With Platelet.  Procedure                               Abnormality         Status                     ---------                               -----------         ------                     CBC W/ DIFFERENTIAL[[id number]]                              Final result                 Please view results for these tests on the individual orders.   CBC W/ DIFFERENTIAL          ED Course as of 04/04/24  2225  ------------------------------------------------------------  Time: 04/04 2223  Comment: While here the patient CMP was normal.  The CBC was normal.  The patient had an x-ray done of the left foot that I interpreted where there was no acute process.  Read the radiology report as well.     XR FOOT, COMPLETE (MIN 3 VIEWS), LEFT (CPT=73630)    Result Date: 4/4/2024            PROCEDURE:  XR FOOT, COMPLETE (MIN 3 VIEWS), LEFT (CPT=73630)  TECHNIQUE:  AP, oblique, and lateral views were obtained.  COMPARISON:  None.  INDICATIONS:  sore on foot, pain  PATIENT STATED HISTORY: (As transcribed by Technologist)  Patient has pain on the plantar surface of her foot and now has inabilty to bear weight. Patient offered no additional history at this time.   FINDINGS: No fracture or dislocation. Joint spaces are well maintained. Pes planus is noted.  Widening of the talonavicular joint along with talar beaking is noted. IMPRESSION: No fracture or dislocation.  Mild pes planus is noted.  Widening of the talonavicular space.  This is nonspecific.   LOCATION:  [facility]   Dictated by (CST): Jacobo Spicer MD on 4/04/2024 at 10:05 PM     Finalized by (CST): Jacobo Spicer MD on 4/04/2024 at 10:06 PM               MDM      Differential diagnosis includes infection, postop pain but not limited to these.  I see no signs of infection or bony injury noted at this time.  Her pain I believe is postoperative pain at this time patient discharged home with anti-inflammatories after the area was dressed and was she will follow-up with her podiatrist    Patient was screened and evaluated during this visit.   As a treating physician attending to the patient, I determined, within reasonable clinical confidence and prior to discharge, that an emergency medical condition was not or was no longer present.  There was no indication for further evaluation, treatment or admission on an emergency basis.       The usual and customary discharge  instuctions were discussed given the patient's ER course.  We discussed signs and symptoms that should prompt the patient's immediate return to the emergency department.   Reasonable over the counter and prescription treatment options and Physician follow up plan was discussed.       The patient is discharged in good condition.     This note was prepared using Dragon Medical voice recognition dictation software.  As a result errors may occur.  When identified to these areas have been corrected.  While every attempt is made to correct errors during dictation discrepancies may still exist.  Please contact if there are any errors.                                   Medical Decision Making      Disposition and Plan     Clinical Impression:  1. Post-op pain         Disposition:  Discharge  4/4/2024 10:25 pm    Follow-up:  Rene Larry Dpm  [address] [place] [id number] [phone]    Schedule an appointment as soon as possible for a visit in 2 day(s)            Medications Prescribed:  Current Discharge Medication List        START taking these medications    Details   naproxen 500 MG Oral Tab Take 1 tablet (500 mg total) by mouth 2 (two) times daily as needed.  Qty: 20 tablet, Refills: 0

## 2024-05-15 ENCOUNTER — HOSPITAL ENCOUNTER (OUTPATIENT)
Dept: ULTRASOUND IMAGING | Age: 59
Discharge: HOME | End: 2024-05-15
Attending: STUDENT IN AN ORGANIZED HEALTH CARE EDUCATION/TRAINING PROGRAM

## 2024-05-15 DIAGNOSIS — E04.2 NONTOXIC MULTINODULAR GOITER: ICD-10-CM

## 2024-05-15 PROCEDURE — 76536 US EXAM OF HEAD AND NECK: CPT | Performed by: STUDENT IN AN ORGANIZED HEALTH CARE EDUCATION/TRAINING PROGRAM

## 2024-05-16 ENCOUNTER — TELEPHONE (OUTPATIENT)
Facility: CLINIC | Age: 59
End: 2024-05-16

## 2024-05-16 NOTE — TELEPHONE ENCOUNTER
24-Patient called into clinic.  Verified name and .  Patient was returning missed call from our office.  Per Owen FRANCO we are waiting for Dr. Zheng to review labs and we will call patient back after labs have been reviewed.

## 2024-05-16 NOTE — TELEPHONE ENCOUNTER
Received the following message from dr Zheng:\"She can see me sooner if I have any earlier openings. I will wait for her labs to result.\"  Patient did not answer, left VM message to call office. Provided call back # and offered patient sooner appt than her July appt. Offered patient 05/28 or 05/29/24. Informed pt that we reached out to dr office to get her labs and dr zheng can discuss US and lab results both at her visit here in the office.   Waiting for patient to call office back.

## 2024-05-17 NOTE — TELEPHONE ENCOUNTER
Per Dr Zheng, have the patient make an appointment for 5/28 or 5/29 and if the faxed labs do not come to office by the patients appointment, we will call and cancel her appointment.   I reached out to the patient and offered her an appointment for 5/28 or 5/29 and informed her that if the labs were not received we would have to cancel the appointment.  Patient verbalized that she has a paper copy of the lab results so if the doctors office does not send the labs to our office Dr Zheng will still have the labs to reviewed.   I made an appt for Jewel on 05/29 @3 pm and reminded her to bring the lab results.  Patient confirmed all of the above.     Closing encounter.

## 2024-05-29 ENCOUNTER — OFFICE VISIT (OUTPATIENT)
Facility: CLINIC | Age: 59
End: 2024-05-29
Payer: MEDICARE

## 2024-05-29 VITALS
SYSTOLIC BLOOD PRESSURE: 138 MMHG | WEIGHT: 200 LBS | HEART RATE: 87 BPM | OXYGEN SATURATION: 98 % | BODY MASS INDEX: 40.32 KG/M2 | HEIGHT: 59 IN | DIASTOLIC BLOOD PRESSURE: 80 MMHG

## 2024-05-29 DIAGNOSIS — R79.89 LOW TSH LEVEL: ICD-10-CM

## 2024-05-29 DIAGNOSIS — E11.9 TYPE 2 DIABETES MELLITUS WITHOUT COMPLICATION, WITHOUT LONG-TERM CURRENT USE OF INSULIN (HCC): ICD-10-CM

## 2024-05-29 DIAGNOSIS — E04.2 NONTOXIC MULTINODULAR GOITER: Primary | ICD-10-CM

## 2024-05-29 DIAGNOSIS — R79.89 OTHER SPECIFIED ABNORMAL FINDINGS OF BLOOD CHEMISTRY: ICD-10-CM

## 2024-05-29 PROCEDURE — 99214 OFFICE O/P EST MOD 30 MIN: CPT | Performed by: STUDENT IN AN ORGANIZED HEALTH CARE EDUCATION/TRAINING PROGRAM

## 2024-05-29 NOTE — PROGRESS NOTES
ENDOCRINOLOGY, DIABETES, & METABOLISM NOTE   Name: Joss Carmen    Date: 5/29/2024    Subjective:   Joss Carmen is a 58 year old female with PMHx significant for HLD, T2DM, MNG, obesity, GERD who presents for consultation to establish care regarding her thyroid.   She was established with Dr. Reeves who recently moved to a further location which led her to establish care with [facility].     Her thyroid nodules were first discovered around 8-9 years ago  They were diagnosed when the nodules were incidentally seen on another imaging modality which led to a dediciated thyroid US  She does endorse FNA of 2 separate nodules on 2 separate occasions and states they were benign.  She denies being treated for hyperthyroidism, however, she did have a  Uptake Scan 6/2016:   1. Normal thyroid uptake of 14.6%.   2. Question of hot nodule in the upper pole of the right lobe of the      thyroid. Correlation with thyroid ultrasound suggested.     She has had FNA of 1.2cm of right mid thyroid nodule pathology benign 5/2017  She also had FNA of a different nodule prior to 2017 but cannot remember the exact time and that came back benign as well  Last thyroid US 1/2022 with multiple nodules that are sub-centimeter    History of neck radiation :   denies     History of recent iodine contrasted studies :   CT chest around 1.5 months ago for shortness of breath    History of recent neck trauma:  denies  History of neck pain, tenderness, dysphagia, odynophagia:   endorses globus sensation for the last month with intermittent dysphagia   Family history of Graves/Hashimoto or other thyroid related disorders :  denies  Weight trajectory: Lost 24 pounds last fall but after she started having hip pain 2/2023 had difficulty with working out and now has regained her weight .    Of note, she was treated for some medication for her thyroid that caused side effects, unsure what the name of the medication was. Thinks it  was for about a week. Last time she saw endo, her bloodwork was within appropriate range for her thyroid.      5/2024  Went to urgent care for abdominal pain and labs showed A1c 6.5%, , TSH 0.362, vitamin D 38 5/2024  Started on simvastatin by PCP around 5/7/2024  PCP wanted patient to talk to endocrinologist regarding Mounjaro  Underwent thyroid US 5/15    History/Other:    Chief Complaint Reviewed and Verified  Nursing Notes Reviewed and   Verified  Tobacco Reviewed  Allergies Reviewed  Medications Reviewed    Medical History Reviewed  Surgical History Reviewed  Family History   Reviewed  Social History Reviewed         Tobacco:  She has never smoked tobacco.    Current Outpatient Medications   Medication Sig Dispense Refill    aspirin-acetaminophen-caffeine 250-250-65 MG Oral Tab Every 6 Hours as needed for Headache      Cholecalciferol (VITAMIN D3) 1.25 MG (75044 UT) Oral Cap Take 1 capsule by mouth.      linaCLOtide (LINZESS) 290 MCG Oral Cap Take 1 capsule (290 mcg total) by mouth daily. 90 capsule 3    linaCLOtide (LINZESS) 290 MCG Oral Cap Take 1 capsule (290 mcg total) by mouth every morning before breakfast. 16 capsule 0    ondansetron 4 MG Oral Tablet Dispersible PLACE 1 TABLET BY MOUTH AND LET DISSOLVE EVERY 8 HOURS AS NEEDED FOR NAUSEAS AND VOMITING      traMADol 50 MG Oral Tab Take 1 tablet (50 mg total) by mouth every 12 (twelve) hours as needed.      omeprazole 20 MG Oral Capsule Delayed Release Take 1 capsule (20 mg total) by mouth every morning before breakfast.      Meloxicam 15 MG Oral Tab Take 1 tablet (15 mg total) by mouth daily.      Calcium Ascorbate 500 MG Oral Tab Take by mouth.      Ibuprofen 200 MG Oral Cap 4 capsules (800 mg total).      aspirin 81 MG Oral Tab Take 1 tablet (81 mg total) by mouth daily.      Triamterene-HCTZ 37.5-25 MG Oral Tab Take 1 tablet by mouth daily.      Probiotic Product (VSL#3) Oral Cap Take by mouth.      ClonazePAM 1 MG Oral Tab Take 1 tablet  (1 mg total) by mouth nightly as needed for Anxiety.       Allergies   Allergen Reactions    Morphine ANAPHYLAXIS, OTHER (SEE COMMENTS) and SHORTNESS OF BREATH     Derivatives SOB    Following a surgical procedure.   Pt. States felt SOB.  Derivatives SOB  Pt. States felt SOB.      Hydromorphone OTHER (SEE COMMENTS)     States not allergic      Metformin OTHER (SEE COMMENTS)     weakness     Past Medical History:    Abdominal distention    Abdominal pain    Abnormal uterine bleeding    Amenorrhea    Anxiety state, unspecified    Arthritis    Back pain    Belching    Bloating    Blurred vision    Chronic cough    Constipation    Diabetes mellitus (HCC)    pre diabetic    Dyspareunia    Easy bruising    H/O mammogram    benign pt stated    H/O mammogram    normal    High cholesterol    Hoarseness, chronic    Leaking of urine    Obstructive sleep apnea (adult) (pediatric)    AHI 5.3 SaO2 cole 81 % autoPAP 6-16 HME    Pain in joints    Pap smear for cervical cancer screening    wnl pt stated    Pap smear for cervical cancer screening    wnl, neg hpv    Pulmonary embolism (HCC)    Sleep disturbance    Stress    Uncomfortable fullness after meals    Wears glasses    Weight gain      Past Surgical History:   Procedure Laterality Date      ,     Cholecystectomy      Lap adjustable gastric band      Had removed in     Oophorectomy  2000    Other surgical history  2015    benign    Other surgical history Right     R hip replacement    Removal gallbladder      Tubal ligation       Social History     Socioeconomic History    Marital status:    Tobacco Use    Smoking status: Never    Smokeless tobacco: Never   Vaping Use    Vaping status: Never Used   Substance and Sexual Activity    Alcohol use: No     Alcohol/week: 0.0 standard drinks of alcohol    Drug use: No    Sexual activity: Not Currently     Partners: Male   Other Topics Concern    Caffeine Concern Yes     Comment: tea occ     Exercise Yes     Comment: trys to walk on treadmill, biking     Social Determinants of Health      Received from [facility], [facility]    Social Connections    Received from [employer]    [facility]     Family History   Problem Relation Age of Onset    Heart Disorder Father         CHF    Diabetes Father     Hypertension Father     Heart Attack Father     Hypertension Mother     Colon Polyps Mother     Lung Disorder Maternal Grandfather     Diabetes Brother     Cancer Maternal Aunt 40        ovarian     Review of Systems:  10 point ROS completed, refer to HPI for pertinent positives    Objective:   /80   Pulse 87   Ht 4' 11\" (1.499 m)   Wt 200 lb (90.7 kg)   LMP 03/28/2015 (Approximate)   SpO2 98%   BMI 40.40 kg/m²  Estimated body mass index is 40.4 kg/m² as calculated from the following:    Height as of this encounter: 4' 11\" (1.499 m).    Weight as of this encounter: 200 lb (90.7 kg).  General Appearance:  Alert, in no acute distress, well developed  Eyes:  normal conjunctivae, sclera  Ears/Nose/Mouth/Throat/Neck:  normal hearing, normal speech and  palpable thyroid nodule  Respiratory:  breathing comfortably on room air, clear to auscultation bilaterally  Cardiovascular:  regular rate and rhythm, no murmurs, S3 or S4, no peripheral edema  Psychiatric:  Oriented to person, place and time, appropriate mood & affect  Skin: Normal moisture and skin texture  Neuro: sensory grossly intact, motor grossly intact.       Laboratory Data   Recent Labs: Labs reviewed. Interpretation: TSH low normal 12/2023    Lab Results   Component Value Date    T4F 1.0 12/22/2023    TSH 0.53 12/22/2023      Recent Labs     01/12/22  1501 12/22/23  1541   T4F 1.1 1.0   TSH 0.594 0.53        Imaging   Radiology: Pertinent imaging reviewed.    5/15/2024 Thyroid US    CONCLUSION:    1. Worsening thyromegaly.  2. There are 2 dominant thyroid nodules identified in the right lobe.  The  TI-RADS 3 lesion in the midpole has demonstrated mild interval decrease in size.     3. The TI-RADS 4 lesion in the lower pole of the right lobe measures slightly larger.  Fine needle aspiration biopsy of the TI-RADS 4 lesion is recommended.        1/2022 Thyroid US  NODULES:    Heterogeneous hypoechoic nodule within the left aspect of the isthmus measuring 1.5 x 0.9 x 0.8 cm and measured 1.2 x 0.6 x 1.0 cm on prior exam.   Heterogeneous hypoechoic nodule within the inferior aspect of the right lobe measuring 1.4 x 0.8 x 1.0 cm and measured 1.1 x 0.6 x 1.0 cm on prior exam.   Heterogeneous hypoechoic nodule within the mid to inferior aspect of the right lobe measuring 1.1 x 0.9 x 1.1 cm and measured 1.1 x 0.8 x 1.0 cm on prior exam.   Heterogeneous hypoechoic nodule within the mid aspect of the right lobe measuring 1.5 x 0.9 by cm and measured 1.3 x 0.9 x 1.2 cm on prior exam.   Hypoechoic nodule within the upper aspect of the right lobe measuring 0.6 x 0.6 x 0 point cm and measured 0.7 x 0.5 x 0.6 cm on prior exam.   Hypoechoic nodule within the upper aspect of the left lobe measuring 0.4 x 0.5 x 0 point cm and measured 0.4 x 0.5 x 0.3 cm on prior exam.          Assessment & Plan:    Joss Carmen is a 58 year old female who presented to clinic:     Nontoxic multinodular goiter (Primary)  -     US FNA THYROID, GUIDE INCLD, FIRST LESION (CPT=10005); Future; Expected date: 05/29/2024  Low TSH level  -     TSH and Free T4  -     Thyroxine (T4), Free, Dialysis/Mass Spectrometry ([facility]); Future; Expected date: 05/29/2024  -     Triiodothyronine (T3) Total   -     Thyroxine (T4), Free, Dialysis/Mass Spectrometry ([facility]); Future; Expected date: 05/29/2024  - Clinically with symptoms of difficulty with weight loss and fatigue at initial visit   - Previously with concern for hyperthyroidism. NM US negative in 2016  - Thyroid US 1/2022, FNA of right mid thyroid nodule 5/2017 with benign pathology    - 5/2024 1.4 cm RLP that has grown since 1/2022   - Pathophysiology of condition, goals of therapy,  d/w pt in detail.   - clinical significance of thyroid testing discussed. Indications and risks/benefits of FNA discussed with patient and she verbalized understanding.  -Will send for FNA of RLP nodule since there has been growth and it has not been biopsied in the past  -Discussed risk of GLP1-RA and thyroid cancer. Discussed that clinicians and patients need to always balance benefit and harm of treatments in light of their alternatives. In a population without specific risk factors for thyroid cancer, the benefits of GLP-1 Satya may outweigh the harm. Discussed contrainidication if pt has personal or family hx of MTC or MEN2.      Type 2 diabetes mellitus without complication, without long-term current use of insulin (HCC)  Had recent hip surgery and notes decreased exercise   A1c 5.7 2/2023 to 6.5 5/2024  Discussed lifestyle changes and follows with PT  Will repeat A1c prior to follow up visit     Vitamin D deficiency  Started on ergo 50k weekly   Follows with PCP and will repeat labs in 3 months       Return in about 6 months (around 11/29/2024).     Patient verbalized understanding of above and all questions were answered.     Rajwinder Zheng,   Endocrinology, Diabetes & Metabolism   5/29/2024

## 2024-05-29 NOTE — PATIENT INSTRUCTIONS
Return Visit   [ x ] Physician in 6 months   [  ] In person or video visit  [  ] In person only    [ x ] After visit summary   [ x ] Placed labs/imaging.    [ x ] Central scheduling # for ultrasound/nuclear med/CT/MRI/DXA  [ x ] Directions to 1st floor lab     It was great seeing you today!    Today we discussed your thyroid and diabetes:  -We reviewed your thyroid labs, thyroid ultrasound, and recent A1c level  -Please repeat your thyroid labs today   -Once your labs are back, I will be in touch regarding next steps  -I also placed an order for biopsy of the right lower thyroid nodule that is 1.4 cm. This has grown slightly since 2022   -Here is a copy of your ultrasound results:  FINDINGS:       MEASUREMENTS:  RIGHT LOBE:  5.4 x 2.1 x 2.8 cm.  Previous measurement was 4.4 x 2.2 x 2.5 cm  LEFT LOBE:  4.7 x 1.8 x 1.9 cm.  Previous measurement was 4.6 x 1.7 x 2.3 cm  ISTHMUS:  1.2 cm     NODULES:  Multi nodular thyroid gland is noted.  The 2 largest lesions identified by the technologist were measured.     There is a 14 x 14 x 12 mm solid hypoechoic nodule within the midpole the right lobe.  Previous measurement was 15 x 15 x 9 mm.  Internal color flow seen.  No definitive calcifications are identified.  The lesion is predominantly hyperechoic to isoechoic   to the rest of the gland with small areas of hypoechogenicity seen.  This is a TI-RADS 3 lesion.        There is a hypoechoic solid nodule within the lower pole the right lobe measuring 14 x 14 x 10 mm.  Previous measurement was 14 x 10 x 8 mm.  This is a TI-RADS 4 lesion.       No discrete left lobe thyroid nodules are identified.           OTHER:  The thyroid echotexture is heterogeneous.  The gland does not appear overly hypervascular.  No adenopathy is appreciated near the thyroid gland.                   Impression   CONCLUSION:    1. Worsening thyromegaly.  2. There are 2 dominant thyroid nodules identified in the right lobe.  The TI-RADS 3 lesion in the  midpole has demonstrated mild interval decrease in size.     3. The TI-RADS 4 lesion in the lower pole of the right lobe measures slightly larger.  Fine needle aspiration biopsy of the TI-RADS 4 lesion is recommended.      -Once your biopsy results, I will be in touch    Take care!  -Dr. Zheng

## 2024-05-30 ENCOUNTER — TELEPHONE (OUTPATIENT)
Facility: CLINIC | Age: 59
End: 2024-05-30

## 2024-05-30 NOTE — TELEPHONE ENCOUNTER
From Dr. Zheng, \"Yes, please have her follow with her cardiologist regarding when to hold her blood thinner. Thank you!\"    Attempt to phone patient, no answer, LM per CARLEE. MCM also sent.

## 2024-05-30 NOTE — TELEPHONE ENCOUNTER
Received phone call from patient stating she just scheduled thyroid nodule biopsy for 6/6.     When she scheduled the procedure- she was told to follow up with our office regarding her blood thinner: should she stop in 5 days prior to biopsy?     Patient states she takes Dabigatran, ordered by Cardiologist, Dr. Nathaniel Rodriguez with [facility].    Reviewed will confirm with Dr. Zheng, may need f/u with Dr. Rodriguez office directly.    18

## 2024-05-30 NOTE — TELEPHONE ENCOUNTER
Patient phoned back- reviewed Dr. Zheng's response. States she has already put in call to Dr. Rodriguez's office, but is concerned she won't hear back in time- if she has to hold blood thinners for 5 days and appt is on 6/6- needs to know by tomorrow.    States she will f/u with our office tomorrow to update- and give contact information that we can call Dr. Rodriguez as well.

## 2024-05-31 NOTE — TELEPHONE ENCOUNTER
Received fax from [employer] [facility] for patient stating \"May hold Pradaxa for 5 days prior to biopsy.\"    Patient phoned office- reviewed she should have received VM message from Cardiology as well. Reviewed ok to hold Pradaxa 5 days prior to biopsy- that is starting tomorrow, 6/1. Patient verbalized understanding and states will do.    Fax sent to scanning.    Closing this encounter.

## 2024-05-31 NOTE — TELEPHONE ENCOUNTER
Attempt to phone patient to f/u on this, no answer, left message per CARLEE asking for call back.

## 2024-05-31 NOTE — TELEPHONE ENCOUNTER
Received phone call back from patient. States she has not heard from Cardiologist, Dr. Rodriguez office.     Reviewed we will try to contact as well and f/u with her.    Phoned Dr. Nathaniel Rodriguez at [phone] spoke with Patsy. Reviewed patient having thyroid biopsy on 6/6 and needs directions for blood thinners.    She took message for Dr. Rodriguez's RN, states will either call us back, or took our fax number to send directions.

## 2024-06-03 ENCOUNTER — LAB ENCOUNTER (OUTPATIENT)
Dept: LAB | Age: 59
End: 2024-06-03
Attending: STUDENT IN AN ORGANIZED HEALTH CARE EDUCATION/TRAINING PROGRAM
Payer: MEDICARE

## 2024-06-03 DIAGNOSIS — R79.89 LOW TSH LEVEL: ICD-10-CM

## 2024-06-03 DIAGNOSIS — R79.89 OTHER SPECIFIED ABNORMAL FINDINGS OF BLOOD CHEMISTRY: ICD-10-CM

## 2024-06-03 LAB
T3 SERPL-MCNC: 105 NG/DL (ref 60–181)
T4 FREE SERPL-MCNC: 0.9 NG/DL (ref 0.8–1.7)
TSI SER-ACNC: 0.45 MIU/ML (ref 0.36–3.74)

## 2024-06-03 PROCEDURE — 84443 ASSAY THYROID STIM HORMONE: CPT | Performed by: STUDENT IN AN ORGANIZED HEALTH CARE EDUCATION/TRAINING PROGRAM

## 2024-06-03 PROCEDURE — 84439 ASSAY OF FREE THYROXINE: CPT

## 2024-06-03 PROCEDURE — 84480 ASSAY TRIIODOTHYRONINE (T3): CPT | Performed by: STUDENT IN AN ORGANIZED HEALTH CARE EDUCATION/TRAINING PROGRAM

## 2024-06-03 PROCEDURE — 84439 ASSAY OF FREE THYROXINE: CPT | Performed by: STUDENT IN AN ORGANIZED HEALTH CARE EDUCATION/TRAINING PROGRAM

## 2024-06-03 PROCEDURE — 36415 COLL VENOUS BLD VENIPUNCTURE: CPT | Performed by: STUDENT IN AN ORGANIZED HEALTH CARE EDUCATION/TRAINING PROGRAM

## 2024-06-05 ENCOUNTER — MED REC SCAN ONLY (OUTPATIENT)
Facility: CLINIC | Age: 59
End: 2024-06-05

## 2024-06-06 ENCOUNTER — HOSPITAL ENCOUNTER (OUTPATIENT)
Dept: ULTRASOUND IMAGING | Facility: HOSPITAL | Age: 59
Discharge: HOME | End: 2024-06-06
Attending: STUDENT IN AN ORGANIZED HEALTH CARE EDUCATION/TRAINING PROGRAM
Payer: MEDICARE

## 2024-06-06 DIAGNOSIS — E11.9 TYPE 2 DIABETES MELLITUS WITHOUT COMPLICATION, WITHOUT LONG-TERM CURRENT USE OF INSULIN (HCC): ICD-10-CM

## 2024-06-06 PROCEDURE — 88173 CYTOPATH EVAL FNA REPORT: CPT | Performed by: STUDENT IN AN ORGANIZED HEALTH CARE EDUCATION/TRAINING PROGRAM

## 2024-06-06 PROCEDURE — 10005 FNA BX W/US GDN 1ST LES: CPT | Performed by: STUDENT IN AN ORGANIZED HEALTH CARE EDUCATION/TRAINING PROGRAM

## 2024-06-10 ENCOUNTER — TELEPHONE (OUTPATIENT)
Facility: CLINIC | Age: 59
End: 2024-06-10

## 2024-06-10 NOTE — TELEPHONE ENCOUNTER
If she having any pain or tenderness to her thyroid?  Her continued headache and weakness should not be secondary to her biopsy.  Did she note any improvement in symptoms after taking Tylenol?

## 2024-06-10 NOTE — TELEPHONE ENCOUNTER
Received call from patient who is not feeling well after her Biopsy    Patient said she was stuck 5 times and was told that this was there protocol at [facility]    Symptoms: Patient is weak, having head aches, full ness feeling in her neck, dry throat, has to clear throat     Patient took tylenol over the weekend    She was told by IR that side effects should only last two days- had procedure on Friday    Routed for review

## 2024-06-10 NOTE — TELEPHONE ENCOUNTER
Phoned patient back. Reviewed Dr. Zheng's response. Patient states she does have tenderness at thyroid. States none of these symptoms started until after her biopsy- feels that things have been \"aggravated\" since then. States Tylenol has not helped symptoms. Patient states she just does not \"feel right\".

## 2024-06-10 NOTE — TELEPHONE ENCOUNTER
Spoke with Dr. Zheng, if patient has increasing pain, redness, tenderness, drainage + fever, or severe pain, general neck swelling, or difficulty breathing or swallowing then she is to let us know. Otherwise, can continue taking Tylenol prn and seeing if there is improvement.     Phoned patient and reviewed above, she verbalized understanding of all. Patient denies any bruising. She denies worsening symptoms, feels like they are holding steady. Reviewed above red flags- if any severe worsening symptoms- patient to report to ER. If symptoms continue as is- can monitor self at home, with hopeful improvement.

## 2024-06-11 LAB — T4 FREE DIALYSIS/MS: 0.86 NG/DL

## 2024-06-12 ENCOUNTER — TELEPHONE (OUTPATIENT)
Facility: CLINIC | Age: 59
End: 2024-06-12

## 2024-06-12 NOTE — TELEPHONE ENCOUNTER
Patient phoned in looking for biopsy results    Biopsy note: Thank you for getting your biopsy done.  The results are within acceptable range and available for your review. This is good news! I know you were having some discomfort after the procedure. How are you feeling now?     Patient still having discomfort, using ice pack for help. Still having head aches.     Patient would like results to be faxed to another doctor who was waiting to put her on Mounjaro.     Will either call our office or MCM in the fax number    Routed for review

## 2024-06-14 NOTE — TELEPHONE ENCOUNTER
Patient phoned office. States she is still having discomfort in her neck, she has \"unusual feeling\" that \"comes and goes\". Sometimes she feels \"weak\" \"tired\", states she is still \"not feeling right\". States in her neck it feels like a \"lump or sticking out\" sometimes \"like I want to throw up, vomit, but I don't actually throw up\".    She denies redness, swelling, bruising, or fevers. She states she has been taking Tylenol but does not feel like this is helping. She has also been doing ice pack- this helps some, but not much. She states symptoms over all maybe have marginally improved in the last week- but not much.     Patient asking if anything else to do at this point for above symptoms? Looking for guidance.     Patient also asking to send thyroid labs, US, biopsy results to:    Dr. Mili Henderson,   Fax #: [phone]    Printed and faxed above, fax confirmation received.

## 2024-06-14 NOTE — TELEPHONE ENCOUNTER
Spoke with Dr. Zheng- recommends patient f/u with walk in care for in person examine and evaluation. Patient states she does not want to go to walk in care- feels like it will be a \"waste of time\". States she is going to monitor self at home, and hope symptoms continue to improve. Reviewed if any fevers, severe pain, swelling of neck, difficulty breathing or swallowing- to report to ER.     Patient denies being able to take Ibuprofen, will continue to take Tylenol PRN.    Closing this encounter.

## 2024-06-26 ENCOUNTER — APPOINTMENT (OUTPATIENT)
Dept: CT IMAGING | Facility: HOSPITAL | Age: 59
End: 2024-06-26
Attending: EMERGENCY MEDICINE

## 2024-06-26 ENCOUNTER — HOSPITAL ENCOUNTER (EMERGENCY)
Facility: HOSPITAL | Age: 59
Discharge: HOME | End: 2024-06-26
Attending: EMERGENCY MEDICINE

## 2024-06-26 VITALS
HEART RATE: 78 BPM | SYSTOLIC BLOOD PRESSURE: 127 MMHG | RESPIRATION RATE: 18 BRPM | WEIGHT: 202 LBS | HEIGHT: 59 IN | BODY MASS INDEX: 40.72 KG/M2 | DIASTOLIC BLOOD PRESSURE: 75 MMHG | TEMPERATURE: 99 F | OXYGEN SATURATION: 98 %

## 2024-06-26 DIAGNOSIS — R10.13 EPIGASTRIC PAIN: Primary | ICD-10-CM

## 2024-06-26 LAB
ALBUMIN SERPL-MCNC: 3.8 G/DL (ref 3.4–5)
ALBUMIN/GLOB SERPL: 0.9 {RATIO} (ref 1–2)
ALP LIVER SERPL-CCNC: 89 U/L
ALT SERPL-CCNC: 28 U/L
ANION GAP SERPL CALC-SCNC: 8 MMOL/L (ref 0–18)
AST SERPL-CCNC: 20 U/L (ref 15–37)
BASOPHILS # BLD AUTO: 0.03 X10(3) UL (ref 0–0.2)
BASOPHILS NFR BLD AUTO: 0.4 %
BILIRUB SERPL-MCNC: 0.3 MG/DL (ref 0.1–2)
BILIRUB UR QL STRIP.AUTO: NEGATIVE
BUN BLD-MCNC: 9 MG/DL (ref 9–23)
CALCIUM BLD-MCNC: 9.7 MG/DL (ref 8.5–10.1)
CHLORIDE SERPL-SCNC: 102 MMOL/L (ref 98–112)
CLARITY UR REFRACT.AUTO: CLEAR
CO2 SERPL-SCNC: 29 MMOL/L (ref 21–32)
COLOR UR AUTO: COLORLESS
CREAT BLD-MCNC: 0.73 MG/DL
EGFRCR SERPLBLD CKD-EPI 2021: 95 ML/MIN/1.73M2 (ref 60–?)
EOSINOPHIL # BLD AUTO: 0.09 X10(3) UL (ref 0–0.7)
EOSINOPHIL NFR BLD AUTO: 1.1 %
ERYTHROCYTE [DISTWIDTH] IN BLOOD BY AUTOMATED COUNT: 13 %
GLOBULIN PLAS-MCNC: 4.4 G/DL (ref 2.8–4.4)
GLUCOSE BLD-MCNC: 148 MG/DL (ref 70–99)
GLUCOSE UR STRIP.AUTO-MCNC: NORMAL MG/DL
HCT VFR BLD AUTO: 40.7 %
HGB BLD-MCNC: 13.5 G/DL
IMM GRANULOCYTES # BLD AUTO: 0.03 X10(3) UL (ref 0–1)
IMM GRANULOCYTES NFR BLD: 0.4 %
KETONES UR STRIP.AUTO-MCNC: NEGATIVE MG/DL
LEUKOCYTE ESTERASE UR QL STRIP.AUTO: NEGATIVE
LIPASE SERPL-CCNC: 18 U/L (ref 13–75)
LYMPHOCYTES # BLD AUTO: 2.99 X10(3) UL (ref 1–4)
LYMPHOCYTES NFR BLD AUTO: 37.8 %
MCH RBC QN AUTO: 30.3 PG (ref 26–34)
MCHC RBC AUTO-ENTMCNC: 33.2 G/DL (ref 31–37)
MCV RBC AUTO: 91.3 FL
MONOCYTES # BLD AUTO: 0.37 X10(3) UL (ref 0.1–1)
MONOCYTES NFR BLD AUTO: 4.7 %
NEUTROPHILS # BLD AUTO: 4.4 X10 (3) UL (ref 1.5–7.7)
NEUTROPHILS # BLD AUTO: 4.4 X10(3) UL (ref 1.5–7.7)
NEUTROPHILS NFR BLD AUTO: 55.6 %
NITRITE UR QL STRIP.AUTO: NEGATIVE
OSMOLALITY SERPL CALC.SUM OF ELEC: 289 MOSM/KG (ref 275–295)
PH UR STRIP.AUTO: 8 [PH] (ref 5–8)
PLATELET # BLD AUTO: 257 10(3)UL (ref 150–450)
POTASSIUM SERPL-SCNC: 3.5 MMOL/L (ref 3.5–5.1)
PROT SERPL-MCNC: 8.2 G/DL (ref 6.4–8.2)
PROT UR STRIP.AUTO-MCNC: NEGATIVE MG/DL
RBC # BLD AUTO: 4.46 X10(6)UL
RBC UR QL AUTO: NEGATIVE
SODIUM SERPL-SCNC: 139 MMOL/L (ref 136–145)
SP GR UR STRIP.AUTO: 1 (ref 1–1.03)
UROBILINOGEN UR STRIP.AUTO-MCNC: NORMAL MG/DL
WBC # BLD AUTO: 7.9 X10(3) UL (ref 4–11)

## 2024-06-26 PROCEDURE — 99284 EMERGENCY DEPT VISIT MOD MDM: CPT

## 2024-06-26 PROCEDURE — 83690 ASSAY OF LIPASE: CPT | Performed by: EMERGENCY MEDICINE

## 2024-06-26 PROCEDURE — 83690 ASSAY OF LIPASE: CPT

## 2024-06-26 PROCEDURE — 96361 HYDRATE IV INFUSION ADD-ON: CPT

## 2024-06-26 PROCEDURE — 85025 COMPLETE CBC W/AUTO DIFF WBC: CPT

## 2024-06-26 PROCEDURE — 84439 ASSAY OF FREE THYROXINE: CPT | Performed by: STUDENT IN AN ORGANIZED HEALTH CARE EDUCATION/TRAINING PROGRAM

## 2024-06-26 PROCEDURE — 80053 COMPREHEN METABOLIC PANEL: CPT | Performed by: EMERGENCY MEDICINE

## 2024-06-26 PROCEDURE — 85025 COMPLETE CBC W/AUTO DIFF WBC: CPT | Performed by: EMERGENCY MEDICINE

## 2024-06-26 PROCEDURE — 84443 ASSAY THYROID STIM HORMONE: CPT | Performed by: STUDENT IN AN ORGANIZED HEALTH CARE EDUCATION/TRAINING PROGRAM

## 2024-06-26 PROCEDURE — 74177 CT ABD & PELVIS W/CONTRAST: CPT | Performed by: EMERGENCY MEDICINE

## 2024-06-26 PROCEDURE — 81003 URINALYSIS AUTO W/O SCOPE: CPT | Performed by: EMERGENCY MEDICINE

## 2024-06-26 PROCEDURE — 96374 THER/PROPH/DIAG INJ IV PUSH: CPT

## 2024-06-26 PROCEDURE — 80053 COMPREHEN METABOLIC PANEL: CPT

## 2024-06-26 RX ORDER — SUCRALFATE ORAL 1 G/10ML
1 SUSPENSION ORAL
Qty: 473 ML | Refills: 0 | Status: SHIPPED | OUTPATIENT
Start: 2024-06-26 | End: 2024-06-28

## 2024-06-26 RX ORDER — KETOROLAC TROMETHAMINE 15 MG/ML
15 INJECTION, SOLUTION INTRAMUSCULAR; INTRAVENOUS ONCE
Status: COMPLETED | OUTPATIENT
Start: 2024-06-26 | End: 2024-06-26

## 2024-06-26 RX ORDER — SODIUM CHLORIDE 9 MG/ML
INJECTION, SOLUTION INTRAVENOUS ONCE
Status: COMPLETED | OUTPATIENT
Start: 2024-06-26 | End: 2024-06-26

## 2024-06-26 NOTE — ED PROVIDER NOTES
Patient Seen in: [facility] Emergency Department      History     Chief Complaint   Patient presents with    Abdominal Pain     Stated Complaint: abd pain x days. Burning in abd. N/V no diarrhea    Subjective:   HPI    Patient is a 58-year-old female presents with left mid abdominal pain.  Patient has chronic intermittent left upper abdominal pain for over a year.  She is followed by GI doc without clear etiology.  She had a CT scan done earlier this month.  This was reviewed in her records.  Mild diverticulitis was noted and she was treated with oral antibiotics.  Pain persisted and this was extended by another week by her GI doctor.  She is now off the antibiotics.  She has had persistent left upper quadrant pain.  Similar previous pain.  She has vomited twice.  Decreased appetite.  No lower abdominal pain.  No other specific complaints.    Objective:   Past Medical History:    Abdominal distention    Abdominal pain    Abnormal uterine bleeding    Amenorrhea    Anxiety state, unspecified    Arthritis    Back pain    Belching    Bloating    Blurred vision    Chronic cough    Constipation    Diabetes mellitus (HCC)    pre diabetic    Dyspareunia    Easy bruising    H/O mammogram    benign pt stated    H/O mammogram    normal    High cholesterol    Hoarseness, chronic    Leaking of urine    Obstructive sleep apnea (adult) (pediatric)    AHI 5.3 SaO2 cole 81 % autoPAP 6-16 HME    Pain in joints    Pap smear for cervical cancer screening    wnl pt stated    Pap smear for cervical cancer screening    wnl, neg hpv    Pulmonary embolism (HCC)    Sleep disturbance    Stress    Uncomfortable fullness after meals    Wears glasses    Weight gain              Past Surgical History:   Procedure Laterality Date      ,     Cholecystectomy      Lap adjustable gastric band  2004    Had removed in     Oophorectomy      Other surgical history  2015    benign    Other surgical history Right      R hip replacement    Removal gallbladder      Tubal ligation  2000                Social History     Socioeconomic History    Marital status:    Tobacco Use    Smoking status: Never    Smokeless tobacco: Never   Vaping Use    Vaping status: Never Used   Substance and Sexual Activity    Alcohol use: No     Alcohol/week: 0.0 standard drinks of alcohol    Drug use: No    Sexual activity: Not Currently     Partners: Male   Other Topics Concern    Caffeine Concern Yes     Comment: tea occ    Exercise Yes     Comment: trys to walk on treadmill, biking     Social Determinants of Health      [facility]    Social Connections    Received from [employer] [employer]    [facility]              Review of Systems    Positive for stated Chief Complaint: Abdominal Pain    Other systems are as noted in HPI.  Constitutional and vital signs reviewed.      All other systems reviewed and negative except as noted above.    Physical Exam     ED Triage Vitals [06/26/24 1502]   BP (!) 162/84   Pulse 94   Resp 18   Temp 99 °F (37.2 °C)   Temp src Temporal   SpO2 98 %   O2 Device None (Room air)       Current Vitals:   Vital Signs  BP: 127/75  Pulse: 78  Resp: 18  Temp: 99 °F (37.2 °C)  Temp src: Temporal  MAP (mmHg): 89    Oxygen Therapy  SpO2: 98 %  O2 Device: None (Room air)            Physical Exam    General: Well-appearing patient of stated age resting comfortably  HEENT: Normocephalic atraumatic.  Nonicteric sclera.  Moist mucous membranes  Lungs: No tachypnea  Cardiac: No tachycardia  Abdomen: Tenderness to the left mid abdomen.  Soft.  No guarding or rebound  Skin: No rashes, pallor  Neuro: No focal deficits.  Extremities: No cyanosis/edema    ED Course     Labs Reviewed   COMP METABOLIC PANEL (14) - Abnormal; Notable for the following components:       Result Value    Glucose 148 (*)     A/G Ratio 0.9 (*)     All other components within normal limits    URINALYSIS WITH CULTURE REFLEX - Abnormal; Notable for the following components:    Urine Color Colorless (*)     All other components within normal limits   LIPASE - Normal   CBC WITH DIFFERENTIAL WITH PLATELET    Narrative:     The following orders were created for panel order CBC With Differential With Platelet.  Procedure                               Abnormality         Status                     ---------                               -----------         ------                     CBC W/ DIFFERENTIAL[[id number]]                              Final result                 Please view results for these tests on the individual orders.   RAINBOW DRAW LAVENDER   RAINBOW DRAW LIGHT GREEN   CBC W/ DIFFERENTIAL             Blood work reviewed.  White count 7.9.  Electrolytes normal.  Glucose 148     Urine without infection.  LFTs normal.  Lipase normal.  White count is normal.  CT abdomen pelvis: No acute process.  Official report reviewed  MDM      Acute on chronic left mid upper abdominal pain.  Differential includes recurrent diverticulitis, gastritis, functional abdominal pain, other.  Followed closely by GI.  Discussed options with patient.  Given her persistent and somewhat worsening symptoms we will proceed to CT scan to rule out recurrent diverticulitis, abscess, other pathology.  Will reassess after imaging.  Will treat with IV fluids and IV Toradol.    Discussed imaging and blood work with patient.  Unclear etiology of patient's ongoing epigastric pain.  Says has been present for several years.  Does have a diagnosis of Reno's esophagus per patient.  Had weight loss surgery years ago.  The band has been removed.  Suggested following up with GI.  Consider surgical follow-up.  Given possible reflux, gastritis will add Carafate to her current regimen.  Portance of following up for chronic abdominal pain was discussed.  No evidence of diverticulitis on CT.  Would hold further antibiotics.                                Medical Decision Making      Disposition and Plan     Clinical Impression:  1. Epigastric pain         Disposition:  Discharge  6/26/2024  7:11 pm    Follow-up:  Michael Gerardo MD  [address] [address] [phone]    Follow up in 1 week(s)      [place] [id number] [facility] [id number]  Follow up in 1 week(s)            Medications Prescribed:  Discharge Medication List as of 6/26/2024  7:12 PM        START taking these medications    Details   sucralfate 1 GM/10ML Oral Suspension Take 10 mL (1 g total) by mouth 4 (four) times daily before meals and nightly., Normal, Disp-473 mL, R-0

## 2024-06-26 NOTE — ED INITIAL ASSESSMENT (HPI)
Patient to ED c/o abdominal pain and burning for a few days. Seen by GI doctor about 1 month ago, was told to continue to taking the antibiotic.    Pain mostly to left upper abdomen.

## 2024-06-27 ENCOUNTER — TELEPHONE (OUTPATIENT)
Dept: CASE MANAGEMENT | Facility: HOSPITAL | Age: 59
End: 2024-06-27

## 2024-06-27 NOTE — CM/SW NOTE
Received call from patient asking for her presciption for sucralfate to be switch from liquid to tablet form as the cost of liquid was excessive. RNCM spoke to [facility] pharmacy in [place], [phone] and had prescription switched. Called patient back and informed.

## 2024-06-27 NOTE — DISCHARGE INSTRUCTIONS
Continue Prilosec.  Can try Carafate 10 mL 3 times a day with meals.  Follow-up with GI.  Consider endoscopy.  Follow-up with primary care.

## 2024-06-28 ENCOUNTER — TELEPHONE (OUTPATIENT)
Facility: CLINIC | Age: 59
End: 2024-06-28

## 2024-06-28 DIAGNOSIS — E04.2 NONTOXIC MULTINODULAR GOITER: Primary | ICD-10-CM

## 2024-06-28 LAB
T4 FREE SERPL-MCNC: 0.9 NG/DL (ref 0.8–1.7)
TSI SER-ACNC: 0.44 MIU/ML (ref 0.36–3.74)

## 2024-06-28 NOTE — TELEPHONE ENCOUNTER
RN spoke with Ray in [facility]. TSH and Free T4 can be added on to current lab specimens drawn 6/26/24.    Per Dr. Zheng: Per verbal order, okay for RN to order add on thyroid labs (TSH/Free T4) to existing 6/26 draw and head/neck ultrasound.  Dr. Zheng recommends pt to see ENT Dr. Short/Dr. Gallardo's group. Patient stated her insurance does not require a referral.     MCM sent to pt.

## 2024-06-28 NOTE — TELEPHONE ENCOUNTER
Typical pain from FNA should last for a few day post but her presentation is atypical. The emergent complications are typically seen within the first 72 hours but we can consider getting an US head/neck to look for any fluid collection. This would be atypical but worth ruling out. We could get tsh, ft4 to rule out a thyroiditis, however, if she is not having palpitations, tremors, rapid weight loss, loose stools then this may not be consistent.  Typical post-FNA treatment is NSAIDs or tylenol. I can understand that this is prolonged and uncomfortable and she is free to get a second opinion if that is what she prefers. She could also consider seeing ENT since they are throat specialists.

## 2024-06-28 NOTE — TELEPHONE ENCOUNTER
RN phoned pt; pt made aware of Dr. Zheng's orders/response:  \"Typical pain from FNA should last for a few day post but her presentation is atypical. The emergent complications are typically seen within the first 72 hours but we can consider getting an US head/neck to look for any fluid collection. This would be atypical but worth ruling out. We could get tsh, ft4 to rule out a thyroiditis, however, if she is not having palpitations, tremors, rapid weight loss, loose stools then this may not be consistent.  Typical post-FNA treatment is NSAIDs or tylenol. I can understand that this is prolonged and uncomfortable and she is free to get a second opinion if that is what she prefers. She could also consider seeing ENT since they are throat specialists.\"    After reviewing above information with patient, pt decided she would like to have ultrasound of the head/neck done, thyroid blood work done to rule out thyroiditis (pt reports NOT having any palpitations, tremors, rapid weight loss, or loose stools but still would like lab work done) and would like a referral to see an ENT doctor.       Message routed to Dr. Zheng.

## 2024-06-28 NOTE — TELEPHONE ENCOUNTER
Patient called RN stating that she is still having post FNA biopsy of thyroid symptoms. Pt c/o \"fullness of neck\", difficulties swallowing, and migraines since thyroid biopsy was done on 6/6/24.  (See TE's dated 6/10/24 and 6/12/24 for similar complaints).    Pt states almost on a daily basis, she is experiencing not only the above symptoms but also states, \" I'm not feeling well. I'm tired, I feel lumps in my neck, I can't function anymore and do the things I need to do because of how I feel. It's affecting my life and this is out of hand\". Pt unable to go to PT because of symptoms.    Pt reports feeling no relief from taking Tylenol and has been to ER/ urgent care and per pt, has been told by staff at those facilities that they can't do anything for her and question the pt as to why doctor advised her to seek care at an ER/urgent care. Pt said, \"They can't do anything for me. They tell me to go the specialist (endocrinologist)\".     Pt considering seeking care from another endo if there are no other recommendations per Dr. Zheng.    Message routed to Dr. Zheng.

## 2024-06-28 NOTE — PROGRESS NOTES
Her thyroid labs are within range.  I will keep an eye out for her head and neck ultrasound.  Please let me know if she has any other questions.

## 2024-07-01 NOTE — TELEPHONE ENCOUNTER
Pt did not read MCM (send 6/28/24) per RN.    RN phoned pt; pt made aware of Dr. Zheng's response/orders: \"Her thyroid labs are within range.  I will keep an eye out for her head and neck ultrasound.  Please let me know if she has any other questions.\"    When asked if pt took Biotin gummies prior to last blood draw (on 6/26/24), the blood used to add on thyroid labs, pt was unsure; stated, \"I cannot really answer that\". RN will make Dr. Zheng aware.     Pt considering having ultrasound of head/neck done at an another (non-[facility]) facility and is asking RN to mail her the order.  RN mailed US order to pt's home address on Located within [facility] in [place].    Message routed to Dr. Zheng.

## 2024-07-10 ENCOUNTER — OFFICE VISIT (OUTPATIENT)
Dept: AUDIOLOGY | Facility: CLINIC | Age: 59
End: 2024-07-10

## 2024-07-10 ENCOUNTER — TELEPHONE (OUTPATIENT)
Dept: OTOLARYNGOLOGY | Facility: CLINIC | Age: 59
End: 2024-07-10

## 2024-07-10 ENCOUNTER — OFFICE VISIT (OUTPATIENT)
Dept: OTOLARYNGOLOGY | Facility: CLINIC | Age: 59
End: 2024-07-10

## 2024-07-10 VITALS — BODY MASS INDEX: 40.72 KG/M2 | HEIGHT: 59 IN | WEIGHT: 202 LBS

## 2024-07-10 DIAGNOSIS — H90.3 SENSORINEURAL HEARING LOSS (SNHL) OF BOTH EARS: Primary | ICD-10-CM

## 2024-07-10 DIAGNOSIS — H93.12 TINNITUS OF LEFT EAR: Primary | ICD-10-CM

## 2024-07-10 DIAGNOSIS — R51.9 CHRONIC NONINTRACTABLE HEADACHE, UNSPECIFIED HEADACHE TYPE: ICD-10-CM

## 2024-07-10 DIAGNOSIS — G89.29 CHRONIC NONINTRACTABLE HEADACHE, UNSPECIFIED HEADACHE TYPE: ICD-10-CM

## 2024-07-10 DIAGNOSIS — H93.8X3 SENSATION OF FULLNESS IN BOTH EARS: ICD-10-CM

## 2024-07-10 DIAGNOSIS — M26.609 TMJ (TEMPOROMANDIBULAR JOINT DISORDER): ICD-10-CM

## 2024-07-10 DIAGNOSIS — H60.8X3 CHRONIC ECZEMATOUS OTITIS EXTERNA OF BOTH EARS: ICD-10-CM

## 2024-07-10 PROCEDURE — 92567 TYMPANOMETRY: CPT | Performed by: AUDIOLOGIST

## 2024-07-10 PROCEDURE — 31231 NASAL ENDOSCOPY DX: CPT | Performed by: STUDENT IN AN ORGANIZED HEALTH CARE EDUCATION/TRAINING PROGRAM

## 2024-07-10 PROCEDURE — 92557 COMPREHENSIVE HEARING TEST: CPT | Performed by: AUDIOLOGIST

## 2024-07-10 PROCEDURE — 99204 OFFICE O/P NEW MOD 45 MIN: CPT | Performed by: STUDENT IN AN ORGANIZED HEALTH CARE EDUCATION/TRAINING PROGRAM

## 2024-07-10 PROCEDURE — 92504 EAR MICROSCOPY EXAMINATION: CPT | Performed by: STUDENT IN AN ORGANIZED HEALTH CARE EDUCATION/TRAINING PROGRAM

## 2024-07-10 RX ORDER — FAMOTIDINE 20 MG/1
20 TABLET, FILM COATED ORAL 2 TIMES DAILY
COMMUNITY
Start: 2024-05-07

## 2024-07-10 RX ORDER — HYOSCYAMINE SULFATE 0.125 MG
TABLET,DISINTEGRATING ORAL
COMMUNITY
Start: 2024-06-16

## 2024-07-10 RX ORDER — RIVAROXABAN 20 MG/1
20 TABLET, FILM COATED ORAL
COMMUNITY
Start: 2024-02-22

## 2024-07-10 RX ORDER — AMOXICILLIN AND CLAVULANATE POTASSIUM 500; 125 MG/1; MG/1
TABLET, FILM COATED ORAL
COMMUNITY
Start: 2024-07-09

## 2024-07-10 RX ORDER — DOCUSATE SODIUM 100 MG/1
100 CAPSULE, LIQUID FILLED ORAL 2 TIMES DAILY PRN
COMMUNITY
Start: 2024-04-25

## 2024-07-10 RX ORDER — SULFAMETHOXAZOLE AND TRIMETHOPRIM 800; 160 MG/1; MG/1
1 TABLET ORAL 2 TIMES DAILY
COMMUNITY
Start: 2024-05-03

## 2024-07-10 RX ORDER — LIDOCAINE HYDROCHLORIDE 20 MG/ML
1 SOLUTION ORAL; TOPICAL 2 TIMES DAILY WITH MEALS
COMMUNITY
Start: 2024-05-09

## 2024-07-10 RX ORDER — SIMVASTATIN 20 MG
20 TABLET ORAL DAILY
COMMUNITY
Start: 2024-05-09

## 2024-07-10 RX ORDER — CYCLOBENZAPRINE HCL 5 MG
5 TABLET ORAL NIGHTLY
Qty: 30 TABLET | Refills: 0 | Status: SHIPPED | OUTPATIENT
Start: 2024-07-10 | End: 2024-08-09

## 2024-07-10 RX ORDER — MOMETASONE FUROATE 1 MG/G
1 OINTMENT TOPICAL DAILY
Qty: 1 EACH | Refills: 0 | Status: SHIPPED | OUTPATIENT
Start: 2024-07-10

## 2024-07-10 RX ORDER — DABIGATRAN ETEXILATE 150 MG/1
150 CAPSULE ORAL 2 TIMES DAILY
COMMUNITY
Start: 2024-05-20

## 2024-07-10 RX ORDER — ATORVASTATIN CALCIUM 10 MG/1
10 TABLET, FILM COATED ORAL DAILY
COMMUNITY
Start: 2024-07-09

## 2024-07-10 RX ORDER — APIXABAN 5 MG/1
5 TABLET, FILM COATED ORAL 2 TIMES DAILY
COMMUNITY
Start: 2024-04-05

## 2024-07-10 RX ORDER — FLUTICASONE PROPIONATE 50 MCG
SPRAY, SUSPENSION (ML) NASAL
COMMUNITY
Start: 2024-07-09

## 2024-07-10 NOTE — TELEPHONE ENCOUNTER
Per patient states ibuprofen was also supposed to be prescribed today, was told that he would prescribed this because higher strength is available at pharmacy versus over the counter. Please advise thank you.

## 2024-07-10 NOTE — PROGRESS NOTES
Joss Carmen is a 58 year old female.   Chief Complaint   Patient presents with    New Patient    Sinus Problem     Patient reports pressure in back of head and sinus issues.    Ear Problem     Patient reports left ear ringing and itching x 2 weeks.     HPI:   58-year-old presents with several complaints including bilateral ear fullness and associated headaches and vision changes.  She has been dealing with this issue for some time and is worse on the left side.  Is missing some teeth and was supposed to undergo dental work but this has not occurred.  Reports ringing on the left side as well.  She was seen for this issue about 3 years ago by another ENT who performed an audiogram at that time demonstrated bilateral sensorineural hearing loss and normal tympanograms.  She was seen for similar issues in 2021 where a CT angiogram and CT of her neck were performed at that time that were normal.    Current Outpatient Medications   Medication Sig Dispense Refill    amoxicillin clavulanate 500-125 MG Oral Tab       ELIQUIS 5 MG Oral Tab Take 1 tablet (5 mg total) by mouth 2 (two) times daily.      atorvastatin 10 MG Oral Tab Take 1 tablet (10 mg total) by mouth daily.      dabigatran 150 MG Oral Cap Take 1 capsule (150 mg total) by mouth 2 (two) times daily.      docusate sodium 100 MG Oral Cap Take 1 capsule (100 mg total) by mouth 2 (two) times daily as needed.      famotidine 20 MG Oral Tab Take 1 tablet (20 mg total) by mouth 2 (two) times daily.      FEROSUL 325 (65 Fe) MG Oral Tab Take 1 tablet (325 mg total) by mouth 2 (two) times daily with meals.      fluticasone propionate 50 MCG/ACT Nasal Suspension       hyoscyamine sulfate 0.125 MG Oral Tablet Dispersible       XARELTO 20 MG Oral Tab Take 1 tablet (20 mg total) by mouth daily with food.      simvastatin 20 MG Oral Tab Take 1 tablet (20 mg total) by mouth daily.      sulfamethoxazole-trimethoprim -160 MG Oral Tab per tablet Take 1 tablet by mouth 2  (two) times daily.      sucralfate 1 g Oral Tab Take 1 tablet (1 g total) by mouth 4 (four) times daily before meals and nightly. 120 tablet 0    linaCLOtide (LINZESS) 290 MCG Oral Cap Take 1 capsule (290 mcg total) by mouth daily. 90 capsule 3    Esomeprazole Magnesium 40 MG Oral Capsule Delayed Release Take 1 capsule (40 mg total) by mouth every morning before breakfast. 30 minutes before breakfast. 90 capsule 3    aspirin-acetaminophen-caffeine 250-250-65 MG Oral Tab Every 6 Hours as needed for Headache      Cholecalciferol (VITAMIN D3) 1.25 MG (45682 UT) Oral Cap Take 1 capsule by mouth.      ondansetron 4 MG Oral Tablet Dispersible PLACE 1 TABLET BY MOUTH AND LET DISSOLVE EVERY 8 HOURS AS NEEDED FOR NAUSEAS AND VOMITING      traMADol 50 MG Oral Tab Take 1 tablet (50 mg total) by mouth every 12 (twelve) hours as needed.      Meloxicam 15 MG Oral Tab Take 1 tablet (15 mg total) by mouth daily.      Calcium Ascorbate 500 MG Oral Tab Take by mouth.      Ibuprofen 200 MG Oral Cap 4 capsules (800 mg total).      aspirin 81 MG Oral Tab Take 1 tablet (81 mg total) by mouth daily.      Triamterene-HCTZ 37.5-25 MG Oral Tab Take 1 tablet by mouth as needed.      Probiotic Product (VSL#3) Oral Cap Take by mouth.      ClonazePAM 1 MG Oral Tab Take 1 tablet (1 mg total) by mouth nightly as needed for Anxiety.        Past Medical History:    Abdominal distention    Abdominal pain    Abnormal uterine bleeding    Amenorrhea    Anxiety state, unspecified    Arthritis    Back pain    Belching    Bloating    Blurred vision    Chronic cough    Constipation    Diabetes mellitus (HCC)    pre diabetic    Dyspareunia    Easy bruising    H/O mammogram    benign pt stated    H/O mammogram    normal    High cholesterol    Hoarseness, chronic    Leaking of urine    Obstructive sleep apnea (adult) (pediatric)    AHI 5.3 SaO2 cole 81 % autoPAP 6-16 HME    Pain in joints    Pap smear for cervical cancer screening    wnl pt stated    Pap  smear for cervical cancer screening    wnl, neg hpv    Pulmonary embolism (HCC)    Sleep disturbance    Stress    Uncomfortable fullness after meals    Wears glasses    Weight gain      Social History:  Social History     Socioeconomic History    Marital status:    Tobacco Use    Smoking status: Never    Smokeless tobacco: Never   Vaping Use    Vaping status: Never Used   Substance and Sexual Activity    Alcohol use: Yes    Drug use: Never    Sexual activity: Not Currently     Partners: Male   Other Topics Concern    Caffeine Concern Yes     Comment: tea occ    Exercise Yes     Comment: trys to walk on treadmill, biking     Social Determinants of Health      [facility]    Social Connections    Received from [employer]    [facility]      Past Surgical History:   Procedure Laterality Date      ,     Cholecystectomy      Lap adjustable gastric band  2004    Had removed in     Oophorectomy      Other surgical history  2015    benign    Other surgical history Right     R hip replacement    Removal gallbladder      Tubal ligation           EXAM:   Ht 4' 11\" (1.499 m)   Wt 202 lb (91.6 kg)   LMP 2015 (Approximate)   BMI 40.80 kg/m²     System Details   Skin Inspection - Normal.   Constitutional Overall appearance - Normal.   Head/Face Symmetric, TMJ tenderness present on the left side   Eyes EOMI, PERRL   Right ear:  Canal clear with slight irritation of the skin, TM intact, no ABELINO   Left ear:  Canal clear with slight irritation of the skin, TM intact, no ABELINO   Nose: Septum midline, inferior turbinates not enlarged, nasal valves without collapse    Oral cavity/Oropharynx: No lesions or masses on inspection or palpation, tonsils symmetric    Neck: Soft without LAD, thyroid not enlarged  Voice clear/ no stridor   Other:      SCOPES AND PROCEDURES:     Nasal Endoscopy Procedure Note      Due to inability for adequate examination of the nose and nasopharynx and need for magnification to perform the examination, endoscopy was performed.  Risks and benefits were discussed with patient/family and they have given verbal consent to proceed.    Pre-operative Diagnosis:   1. Tinnitus of left ear    2. Chronic nonintractable headache, unspecified headache type    3. Chronic eczematous otitis externa of both ears    4. TMJ (temporomandibular joint disorder)    5. Sensation of fullness in both ears        Post-operative Diagnosis: Same    Procedure: Diagnostic nasal endoscopy    Anesthesia: Topical anesthetic [place]     Surgeon Sundar Kiran MD    EBL: 0cc    Procedure Detail & Findings:     After placement of topical anesthetic intranasally the endoscope was inserted into each nares and driven through the nasal cavity into the nasopharynx. The following findings were noted:    Septum: Midline  Inferior turbinates: Normal  Middle meatus: Patent  Middle turbinates: Normal  Purulence: None noted  Polyps: None noted  Nasopharynx and eustachian tube: No masses  Other: The middle and superior meatus, the turbinates, and the spheno-ethmoid recess were inspected and seen to be without significant abnormal findings.     Condition: Stable    Complications: Patient tolerated the procedure well with no immediate complication.    Sundar Kiran MD    AUDIOGRAM AND IMAGING:     Audiogram with normal tympanograms and a slight asymmetry in her hearing by 10 dB but only at frequencies between 2000 to 4000 Hz.  Had symmetric word discrimination scores    IMPRESSION:   1. Tinnitus of left ear  - Audiology Referral - In Network    2. Chronic nonintractable headache, unspecified headache type  - Neuro Referral - [facility] ([place])    3. Chronic eczematous otitis externa of both ears    4. TMJ (temporomandibular joint disorder)    5. Sensation of fullness in both ears       Recommendations:  -Discussed the differential diagnosis for  her symptoms for which she had been seen by an ENT in 2021 with similar complaints.  The differential would include a vestibular migraine given the vision changes or a form of a headache or chronic facial pain syndrome possibly related to TMD in the setting of missing dentition and chewing primarily on 1 side of her mouth.  She reports tightness and tenderness of the left temporomandibular joint area and fullness in her ear.  She had a normal ear exam and normal tympanogram there is no fluid or otitis present  -Will trial a muscle relaxant for possible TMD and discussed following up with her dentist and also eating a soft diet with a warm compress as well for comfort.  She is on anticoagulation precluding the use of NSAIDs currently  -Will prescribe her mometasone ointment for likely underlying dermatitis or eczema of her ear canal skin  -Will also place a neurology referral  -She had negative imaging in 2021 and also hearing loss present on an audiogram in 2021 and would like to forego imaging at this time and defer to neurology if thought to be needed    The patient indicates understanding of these issues and agrees to the plan.      Sundar Kiran MD  7/10/2024  11:39 AM

## 2024-07-11 NOTE — TELEPHONE ENCOUNTER
, per pt stopped taking blood  thinner back at the end of June after end of PT for Hip surgery. She is no longer taking blood thinner. Per pt also asking if you think her symptoms can be related to allergy and she should get an Allergy Evaluation. Please advise

## 2024-07-16 RX ORDER — MELOXICAM 15 MG/1
15 TABLET ORAL NIGHTLY
Qty: 30 TABLET | Refills: 0 | Status: SHIPPED | OUTPATIENT
Start: 2024-07-16 | End: 2024-08-15

## 2024-07-24 ENCOUNTER — HOSPITAL ENCOUNTER (OUTPATIENT)
Dept: ULTRASOUND IMAGING | Age: 59
Discharge: HOME | End: 2024-07-24
Attending: STUDENT IN AN ORGANIZED HEALTH CARE EDUCATION/TRAINING PROGRAM
Payer: MEDICARE

## 2024-07-24 DIAGNOSIS — E04.2 NONTOXIC MULTINODULAR GOITER: ICD-10-CM

## 2024-07-24 PROCEDURE — 76536 US EXAM OF HEAD AND NECK: CPT | Performed by: STUDENT IN AN ORGANIZED HEALTH CARE EDUCATION/TRAINING PROGRAM

## 2024-08-25 ENCOUNTER — APPOINTMENT (OUTPATIENT)
Dept: GENERAL RADIOLOGY | Age: 59
End: 2024-08-25
Attending: EMERGENCY MEDICINE
Payer: MEDICARE

## 2024-08-25 ENCOUNTER — APPOINTMENT (OUTPATIENT)
Dept: CT IMAGING | Age: 59
End: 2024-08-25
Attending: EMERGENCY MEDICINE
Payer: MEDICARE

## 2024-08-25 ENCOUNTER — HOSPITAL ENCOUNTER (EMERGENCY)
Age: 59
Discharge: HOME | End: 2024-08-25
Attending: EMERGENCY MEDICINE
Payer: MEDICARE

## 2024-08-25 VITALS
OXYGEN SATURATION: 98 % | HEIGHT: 59 IN | RESPIRATION RATE: 16 BRPM | WEIGHT: 202 LBS | TEMPERATURE: 98 F | HEART RATE: 75 BPM | SYSTOLIC BLOOD PRESSURE: 132 MMHG | DIASTOLIC BLOOD PRESSURE: 81 MMHG | BODY MASS INDEX: 40.72 KG/M2

## 2024-08-25 DIAGNOSIS — R10.9 ACUTE LEFT FLANK PAIN: Primary | ICD-10-CM

## 2024-08-25 LAB
ALBUMIN SERPL-MCNC: 3.6 G/DL (ref 3.4–5)
ALBUMIN/GLOB SERPL: 0.9 {RATIO} (ref 1–2)
ALP LIVER SERPL-CCNC: 72 U/L
ALT SERPL-CCNC: 22 U/L
ANION GAP SERPL CALC-SCNC: 8 MMOL/L (ref 0–18)
AST SERPL-CCNC: 15 U/L (ref 15–37)
BASOPHILS # BLD AUTO: 0.02 X10(3) UL (ref 0–0.2)
BASOPHILS NFR BLD AUTO: 0.2 %
BILIRUB SERPL-MCNC: 0.2 MG/DL (ref 0.1–2)
BILIRUB UR QL STRIP.AUTO: NEGATIVE
BUN BLD-MCNC: 8 MG/DL (ref 9–23)
CALCIUM BLD-MCNC: 9.4 MG/DL (ref 8.5–10.1)
CHLORIDE SERPL-SCNC: 103 MMOL/L (ref 98–112)
CLARITY UR REFRACT.AUTO: CLEAR
CO2 SERPL-SCNC: 28 MMOL/L (ref 21–32)
COLOR UR AUTO: YELLOW
CREAT BLD-MCNC: 0.86 MG/DL
D DIMER PPP FEU-MCNC: <0.27 UG/ML FEU (ref ?–0.59)
EGFRCR SERPLBLD CKD-EPI 2021: 78 ML/MIN/1.73M2 (ref 60–?)
EOSINOPHIL # BLD AUTO: 0 X10(3) UL (ref 0–0.7)
EOSINOPHIL NFR BLD AUTO: 0 %
ERYTHROCYTE [DISTWIDTH] IN BLOOD BY AUTOMATED COUNT: 12.7 %
GLOBULIN PLAS-MCNC: 3.8 G/DL (ref 2.8–4.4)
GLUCOSE BLD-MCNC: 173 MG/DL (ref 70–99)
GLUCOSE UR STRIP.AUTO-MCNC: NEGATIVE MG/DL
HCT VFR BLD AUTO: 35.3 %
HGB BLD-MCNC: 12.1 G/DL
IMM GRANULOCYTES # BLD AUTO: 0.13 X10(3) UL (ref 0–1)
IMM GRANULOCYTES NFR BLD: 1.4 %
KETONES UR STRIP.AUTO-MCNC: NEGATIVE MG/DL
LEUKOCYTE ESTERASE UR QL STRIP.AUTO: NEGATIVE
LYMPHOCYTES # BLD AUTO: 1.65 X10(3) UL (ref 1–4)
LYMPHOCYTES NFR BLD AUTO: 17.3 %
MCH RBC QN AUTO: 31.6 PG (ref 26–34)
MCHC RBC AUTO-ENTMCNC: 34.3 G/DL (ref 31–37)
MCV RBC AUTO: 92.2 FL
MONOCYTES # BLD AUTO: 0.24 X10(3) UL (ref 0.1–1)
MONOCYTES NFR BLD AUTO: 2.5 %
NEUTROPHILS # BLD AUTO: 7.51 X10 (3) UL (ref 1.5–7.7)
NEUTROPHILS # BLD AUTO: 7.51 X10(3) UL (ref 1.5–7.7)
NEUTROPHILS NFR BLD AUTO: 78.6 %
NITRITE UR QL STRIP.AUTO: NEGATIVE
OSMOLALITY SERPL CALC.SUM OF ELEC: 290 MOSM/KG (ref 275–295)
PH UR STRIP.AUTO: 7 [PH] (ref 5–8)
PLATELET # BLD AUTO: 272 10(3)UL (ref 150–450)
POTASSIUM SERPL-SCNC: 3.7 MMOL/L (ref 3.5–5.1)
PROT SERPL-MCNC: 7.4 G/DL (ref 6.4–8.2)
PROT UR STRIP.AUTO-MCNC: NEGATIVE MG/DL
RBC # BLD AUTO: 3.83 X10(6)UL
RBC UR QL AUTO: NEGATIVE
SODIUM SERPL-SCNC: 139 MMOL/L (ref 136–145)
SP GR UR STRIP.AUTO: 1.01 (ref 1–1.03)
TROPONIN I SERPL HS-MCNC: 5 NG/L
UROBILINOGEN UR STRIP.AUTO-MCNC: 0.2 MG/DL
WBC # BLD AUTO: 9.6 X10(3) UL (ref 4–11)

## 2024-08-25 PROCEDURE — 74176 CT ABD & PELVIS W/O CONTRAST: CPT | Performed by: EMERGENCY MEDICINE

## 2024-08-25 PROCEDURE — 93010 ELECTROCARDIOGRAM REPORT: CPT

## 2024-08-25 PROCEDURE — 96374 THER/PROPH/DIAG INJ IV PUSH: CPT

## 2024-08-25 PROCEDURE — 84484 ASSAY OF TROPONIN QUANT: CPT | Performed by: EMERGENCY MEDICINE

## 2024-08-25 PROCEDURE — 99285 EMERGENCY DEPT VISIT HI MDM: CPT

## 2024-08-25 PROCEDURE — 93005 ELECTROCARDIOGRAM TRACING: CPT

## 2024-08-25 PROCEDURE — 85025 COMPLETE CBC W/AUTO DIFF WBC: CPT | Performed by: EMERGENCY MEDICINE

## 2024-08-25 PROCEDURE — 96375 TX/PRO/DX INJ NEW DRUG ADDON: CPT

## 2024-08-25 PROCEDURE — 81003 URINALYSIS AUTO W/O SCOPE: CPT | Performed by: EMERGENCY MEDICINE

## 2024-08-25 PROCEDURE — 71045 X-RAY EXAM CHEST 1 VIEW: CPT | Performed by: EMERGENCY MEDICINE

## 2024-08-25 PROCEDURE — 96361 HYDRATE IV INFUSION ADD-ON: CPT

## 2024-08-25 PROCEDURE — 85379 FIBRIN DEGRADATION QUANT: CPT | Performed by: EMERGENCY MEDICINE

## 2024-08-25 PROCEDURE — 80053 COMPREHEN METABOLIC PANEL: CPT | Performed by: EMERGENCY MEDICINE

## 2024-08-25 PROCEDURE — 99284 EMERGENCY DEPT VISIT MOD MDM: CPT

## 2024-08-25 RX ORDER — MAGNESIUM HYDROXIDE/ALUMINUM HYDROXICE/SIMETHICONE 120; 1200; 1200 MG/30ML; MG/30ML; MG/30ML
30 SUSPENSION ORAL ONCE
Status: COMPLETED | OUTPATIENT
Start: 2024-08-25 | End: 2024-08-25

## 2024-08-25 RX ORDER — CYCLOBENZAPRINE HCL 10 MG
10 TABLET ORAL ONCE
Status: COMPLETED | OUTPATIENT
Start: 2024-08-25 | End: 2024-08-25

## 2024-08-25 RX ORDER — ONDANSETRON 2 MG/ML
4 INJECTION INTRAMUSCULAR; INTRAVENOUS ONCE
Status: COMPLETED | OUTPATIENT
Start: 2024-08-25 | End: 2024-08-25

## 2024-08-25 RX ORDER — LIDOCAINE HYDROCHLORIDE 20 MG/ML
10 SOLUTION OROPHARYNGEAL ONCE
Status: COMPLETED | OUTPATIENT
Start: 2024-08-25 | End: 2024-08-25

## 2024-08-25 RX ORDER — MELOXICAM 7.5 MG/1
7.5 TABLET ORAL DAILY
Qty: 10 TABLET | Refills: 0 | Status: SHIPPED | OUTPATIENT
Start: 2024-08-25 | End: 2024-09-04

## 2024-08-25 RX ORDER — HYDROMORPHONE HYDROCHLORIDE 1 MG/ML
0.5 INJECTION, SOLUTION INTRAMUSCULAR; INTRAVENOUS; SUBCUTANEOUS ONCE
Status: COMPLETED | OUTPATIENT
Start: 2024-08-25 | End: 2024-08-25

## 2024-08-25 RX ORDER — LIDOCAINE 50 MG/G
2 PATCH TOPICAL EVERY 24 HOURS
Qty: 14 EACH | Refills: 0 | Status: SHIPPED | OUTPATIENT
Start: 2024-08-25

## 2024-08-25 RX ORDER — CYCLOBENZAPRINE HCL 10 MG
10 TABLET ORAL 3 TIMES DAILY PRN
Qty: 20 TABLET | Refills: 0 | Status: SHIPPED | OUTPATIENT
Start: 2024-08-25 | End: 2024-09-01

## 2024-08-26 NOTE — ED INITIAL ASSESSMENT (HPI)
Pt c/o left lower abdominal pain since Monday.  + nausea.  Intermittent chest pressure since Thursday.

## 2024-08-26 NOTE — ED PROVIDER NOTES
Patient Seen in: [facility] [place]      History     Chief Complaint   Patient presents with    Abdomen/Flank Pain     Stated Complaint: L upper quad abd pain    Subjective:   HPI    59 female presents to the [facility] with a variety of different issues.  She states that things started on Monday where she has some left upper quadrant pain that radiates into her left flank she has a history of having had issues with reflux and gastritis and so started taking omeprazole as well as probiotics.  She states that she has been doing a bland diet but the symptoms have persisted.  She states that going into her flank is unusual.  Patient now is having some intermittent episodes of chest pain.  She does not know if it is related to the stress of everything but she did have a DVT and PE after hip surgery about a year ago.  She is no longer on anticoagulants.  She has not noted any increased swelling in her lower extremities.  Fevers.  She denies any urgency frequency or dysuria.  She does state that she has been constipated and that she intermittently takes magnesium citrate she has not had a bowel movement in 3 days.  She has not tried taking any medications in the past 3 days for this.    Objective:   Past Medical History:    Abdominal distention    Abdominal pain    Abnormal uterine bleeding    Amenorrhea    Anxiety state, unspecified    Arthritis    Back pain    Belching    Bloating    Blurred vision    Chronic cough    Constipation    Diabetes mellitus (HCC)    pre diabetic    Dyspareunia    Easy bruising    H/O mammogram    benign pt stated    H/O mammogram    normal    High cholesterol    Hoarseness, chronic    Leaking of urine    Obstructive sleep apnea (adult) (pediatric)    AHI 5.3 SaO2 cole 81 % autoPAP 6-16 HME    Pain in joints    Pap smear for cervical cancer screening    wnl pt stated    Pap smear for cervical cancer screening    wnl, neg hpv    Pulmonary embolism (HCC)    Sleep  disturbance    Stress    Uncomfortable fullness after meals    Wears glasses    Weight gain              Past Surgical History:   Procedure Laterality Date      ,     Cholecystectomy      Lap adjustable gastric band  2004    Had removed in     Oophorectomy  2000    Other surgical history  2015    benign    Other surgical history Right     R hip replacement    Removal gallbladder      Tubal ligation  2000                Social History     Socioeconomic History    Marital status:    Tobacco Use    Smoking status: Never    Smokeless tobacco: Never   Vaping Use    Vaping status: Never Used   Substance and Sexual Activity    Alcohol use: Yes    Drug use: Never    Sexual activity: Not Currently     Partners: Male   Other Topics Concern    Caffeine Concern Yes     Comment: tea occ    Exercise Yes     Comment: trys to walk on treadmill, biking     Social Determinants of Health      [facility]    Social Connections    [facility]              Review of Systems   All other systems reviewed and are negative.      Positive for stated Chief Complaint: Abdomen/Flank Pain    Other systems are as noted in HPI.  Constitutional and vital signs reviewed.      All other systems reviewed and negative except as noted above.    Physical Exam     ED Triage Vitals [24]   /85   Pulse 89   Resp 20   Temp 97.7 °F (36.5 °C)   Temp src Oral   SpO2 98 %   O2 Device None (Room air)       Current Vitals:   Vital Signs  BP: 138/77  Pulse: 76  Resp: 20  Temp: 97.7 °F (36.5 °C)  Temp src: Oral    Oxygen Therapy  SpO2: 98 %  O2 Device: None (Room air)            Physical Exam  Vitals and nursing note reviewed.   Constitutional:       Appearance: Normal appearance. She is well-developed.   HENT:      Head: Normocephalic and atraumatic.   Cardiovascular:      Rate and Rhythm: Normal rate and  regular rhythm.      Pulses: Normal pulses.      Heart sounds: Normal heart sounds.   Pulmonary:      Effort: Pulmonary effort is normal.      Breath sounds: Normal breath sounds. No stridor. No wheezing.   Abdominal:      General: Bowel sounds are normal.      Palpations: Abdomen is soft.      Tenderness: There is abdominal tenderness. There is no rebound.      Comments: Mild left lower quadrant pain and left flank pain but not significantly changed with palpation   Musculoskeletal:         General: No tenderness. Normal range of motion.      Cervical back: Normal range of motion and neck supple.      Right lower leg: No edema.      Left lower leg: No edema.   Lymphadenopathy:      Cervical: No cervical adenopathy.   Skin:     General: Skin is warm and dry.      Capillary Refill: Capillary refill takes less than 2 seconds.      Coloration: Skin is not pale.   Neurological:      General: No focal deficit present.      Mental Status: She is alert and oriented to person, place, and time.      Cranial Nerves: No cranial nerve deficit.      Coordination: Coordination normal.              ED Course     Labs Reviewed   COMP METABOLIC PANEL (14) - Abnormal; Notable for the following components:       Result Value    Glucose 173 (*)     BUN 8 (*)     A/G Ratio 0.9 (*)     All other components within normal limits   TROPONIN I HIGH SENSITIVITY - Normal   D-DIMER - Normal   CBC WITH DIFFERENTIAL WITH PLATELET   URINALYSIS WITH CULTURE REFLEX     EKG    Rate, intervals and axes as noted on EKG Report.  Rate: 87  Rhythm: Sinus Rhythm  Reading: No acute ST segment changes                 CT ABDOMEN+PELVIS KIDNEYSTONE 2D RNDR(NO IV,NO ORAL)(CPT=74176)    Result Date: 8/25/2024  PROCEDURE:  CT ABDOMEN+PELVIS KIDNEYSTONE 2D RNDR(NO IV,NO ORAL)(CPT=74176)  COMPARISON:  [facility] , CT, CT ABDOMEN+PELVIS(CONTRAST ONLY)(CPT=74177), 6/26/2024, 4:59 PM.  INDICATIONS:  L upper quad abd pain  TECHNIQUE:  Unenhanced multislice CT scanning from  above the kidneys to below the urinary bladder.  2D rendering are generated on the CT scanner workstation to localize potential stones in the cranio-caudal plane.  Dose reduction techniques were used. Dose information is transmitted to the ACR (American College of Radiology) NRDR (National Radiology Data Registry) which includes the Dose Index Registry.  PATIENT STATED HISTORY: (As transcribed by Technologist)  L upper quad abd pain.    FINDINGS:  KIDNEYS:  No mass, obstruction, or calcification. BLADDER:  No mass, calculus or significant wall thickening. ADRENALS:  No mass or enlargement.  LIVER:  No enlargement, atrophy, abnormal density, or significant focal lesion.  BILIARY:  Gallbladder is surgically absent. PANCREAS:  No lesion, fluid collection, ductal dilatation, or atrophy.  SPLEEN:  No enlargement or focal lesion.  AORTA/VASCULAR:  No aneurysm.  RETROPERITONEUM:  No mass or adenopathy.  BOWEL/MESENTERY:  No visible mass, obstruction, or bowel wall thickening.  Appendix is normal. ABDOMINAL WALL:  No mass or hernia.  BONES:  No bony lesion or fracture. PELVIC ORGANS:  Streak artifact from the patient's right hip prosthesis limits evaluation of the pelvic structures.  Pelvic structures are grossly unremarkable.  LUNG BASES:  No visible pulmonary or pleural disease.  OTHER:  Negative.             CONCLUSION:  No acute abnormality in the abdomen and pelvis..    LOCATION:  [facility]   Dictated by (CST): Gurmeet Richardson MD on 8/25/2024 at 8:56 PM     Finalized by (CST): Gurmeet Richardson MD on 8/25/2024 at 9:01 PM       XR CHEST AP PORTABLE  (CPT=71045)    Result Date: 8/25/2024  PROCEDURE:  XR CHEST AP PORTABLE  (CPT=71045)  TECHNIQUE:  AP chest radiograph was obtained.  COMPARISON:  [facility], XR, XR CHEST PA + LAT CHEST (CPT=71020), 6/26/2015, 11:43 AM.  INDICATIONS:  L upper quad abd pain  PATIENT STATED HISTORY: (As transcribed by Technologist)  Patient states LLQ pain, nausea and shortness of breath.   FINDINGS:   The cardiomediastinal silhouette is within normal limits.  There is no consolidation, effusion, or pneumothorax.  No aggressive osseous lesions are identified.            CONCLUSION: No acute cardiopulmonary abnormality.   LOCATION:  [facility]      Dictated by (CST): Gurmeet Richardson MD on 8/25/2024 at 8:02 PM     Finalized by (CST): Gurmeet Richardson MD on 8/25/2024 at 8:02 PM              MDM      Patient had IV established and blood work obtained.  She is placed on a monitor.  She had an EKG that was normal.  Patient had chest x-ray that I personally reviewed did not appreciate a pneumothorax pleural effusion or significant consolidation that could give her discomfort I reviewed radiology report they felt this with normal notes as well.  She had a negative D-dimer and at this time with a good O2 saturation feel that this is negative for pulmonary embolism.  She had a normal troponin and her chest pain is not consistent with a cardiac etiology and I feel this with a normal EKG essentially rules this out.  She underwent a CT scan of her abdomen pelvis there was no evidence of any acute abnormality in that flank that would be giving her issues.  Patient does have a hip prosthesis that was done within the past year and we discussed how that could change gait and this could be more psoas muscle pain and more muscle wall pain.  Patient was insistent that it is potentially her gastritis.  She was given a GI cocktail and this did not help her pain at all.  Patient was requesting further pain medications.  She will be given medicines for musculoskeletal pain.  She is to follow-up with her primary care physician for any further evaluations and treatments.  She was discharged in good condition                                   Medical Decision Making      Disposition and Plan     Clinical Impression:  1. Acute left flank pain         Disposition:  Discharge  8/25/2024 10:03 pm    Follow-up:  Michael Gerardo MD  [facility]  [place]  [address] [address]  [phone]    Schedule an appointment as soon as possible for a visit            Medications Prescribed:  Current Discharge Medication List        START taking these medications    Details   lidocaine 5 % External Patch Place 2 patches onto the skin daily.  Qty: 14 each, Refills: 0

## 2024-08-27 LAB
ATRIAL RATE: 87 BPM
P AXIS: 65 DEGREES
P-R INTERVAL: 150 MS
Q-T INTERVAL: 354 MS
QRS DURATION: 86 MS
QTC CALCULATION (BEZET): 425 MS
R AXIS: 25 DEGREES
T AXIS: 37 DEGREES
VENTRICULAR RATE: 87 BPM

## 2024-09-24 ENCOUNTER — APPOINTMENT (OUTPATIENT)
Dept: GASTROENTEROLOGY | Age: 59
End: 2024-09-24

## 2024-10-22 NOTE — PROGRESS NOTES
Joss Carmen    1965       Patient presents with:  Consult: New pt consult // Discuss ultrasound - fibroids // Evelyn Bernice a bump about the size of a grape near vaginal opening // Had PMB in 2023 that started off as spotting and became a full flow per pt   Pt is here with her . She is the mother of Tank Greenberg. She had pink spotting at the end of July that then turned shamika a full flow for a few days - just stopped a few days ago. She felt inside her vagina a few days ago and felt a lump. She has also been feeling tired,bloated with LBP. She had an US done 2023 which showed 2 small 1-2 cm fibroids but her endometrium is 8.1mm. I rec EMB and explained procedure to pt. She will reschedule for this after taking motrin 600mg 30 min before.     Past Medical History:   Diagnosis Date    Abdominal distention     Abdominal pain     Abnormal uterine bleeding 2015     Amenorrhea 2014-2015    Anxiety state, unspecified     Arthritis     Back pain     Belching     Bloating     Blurred vision     Chronic cough     Constipation     Diabetes mellitus ([address]     pre diabetic    Dyspareunia     Easy bruising     H/O mammogram 10-1-2014    benign pt stated    H/O mammogram 2014    normal    Hoarseness, chronic     Leaking of urine     Obstructive sleep apnea (adult) (pediatric) [facility] PSG 17    AHI 5.3 SaO2 cole 81 % autoPAP 6-16 HME    Pain in joints     Pap smear for cervical cancer screening 10-1-2014    wnl pt stated    Pap smear for cervical cancer screening 2014    wnl, neg hpv    Sleep disturbance     Stress     Uncomfortable fullness after meals     Weight gain        Past Surgical History:   Procedure Laterality Date      , [address] GASTRIC BAND  2004    Had removed in     OOPHORECTOMY      OTHER SURGICAL HISTORY  3/2015    benign    REMOVAL GALLBLADDER      TUBAL LIGATION          Menarche: 14  Period Cycle (Days): Orders faxed.  Emily Gonzalez LPN    28 to 30  Period Duration (Days): 4 to 7  Period Flow: heavy  Use of Birth Control (if yes, specify type): tubal  Hx Prior Abnormal Pap: No  Pap Date: 05/03/23  Pap Result Notes: Neg Pap/HPV // Mammo 6/21/23  Bi-rads C1 - neg asses      albuterol 108 (90 Base) MCG/ACT Inhalation Aero Soln, Inhale 2 puffs into the lungs every 4 (four) hours as needed. , Disp: , Rfl:   amoxicillin clavulanate 875-125 MG Oral Tab, Take 1 tablet by mouth 2 (two) times daily. , Disp: , Rfl:   ondansetron 4 MG Oral Tablet Dispersible, PLACE 1 TABLET BY MOUTH AND LET DISSOLVE EVERY 8 HOURS AS NEEDED FOR NAUSEAS AND VOMITING, Disp: , Rfl:   traMADol 50 MG Oral Tab, Take 1 tablet (50 mg total) by mouth every 12 (twelve) hours as needed. , Disp: , Rfl:   omeprazole 20 MG Oral Capsule Delayed Release, Take 1 capsule (20 mg total) by mouth every morning before breakfast., Disp: , Rfl:   Meloxicam 15 MG Oral Tab, Take 1 tablet (15 mg total) by mouth daily. , Disp: , Rfl:   meclizine 25 MG Oral Tab, , Disp: , Rfl:   cetirizine 10 MG Oral Tab, Take 1 tablet (10 mg total) by mouth daily. , Disp: , Rfl:   Calcium Ascorbate 500 MG Oral Tab, Take by mouth., Disp: , Rfl:   Ibuprofen 200 MG Oral Cap, 4 capsules (800 mg total). , Disp: , Rfl:   aspirin 81 MG Oral Tab, Take 1 tablet (81 mg total) by mouth daily. , Disp: , Rfl:   Triamterene-HCTZ 37.5-25 MG Oral Tab, Take 1 tablet by mouth daily. , Disp: , Rfl:   Probiotic Product (VSL#3) Oral Cap, Take by mouth., Disp: , Rfl:   ClonazePAM 1 MG Oral Tab, Take 1 tablet (1 mg total) by mouth 2 (two) times daily as needed for Anxiety. , Disp: , Rfl:   lubiprostone (AMITIZA) 24 MCG Oral Cap, Take 1 capsule (24 mcg total) by mouth daily with breakfast. (Patient not taking: Reported on 8/18/2023), Disp: 90 capsule, Rfl: 3  Hyoscyamine Sulfate 0.125 MG Sublingual SL Tab, , Disp: , Rfl:     No current facility-administered medications on file prior to visit.         PHYSICAL EXAM:       Constitutional: well developed, well nourished  Head/Face: normocephalic  Abdomen:  soft, nontender, nondistended, no masses  Psychiatric:  Oriented to time, place, person and situation. Appropriate mood and affect    Pelvic Exam:  External Genitalia: normal appearance, hair distribution, and no lesions  Urethral Meatus:  normal in size, location, without lesions and prolapse  Bladder:  No fullness, masses or tenderness  Vagina:  Normal appearance without lesions, no abnormal discharge  Cervix:  obscurred by a large polyp prolapsing thru os -at least 8x2 cm with ? Thin stalk- polyp was very mobile at it's base but is too large to do without having access to cautery- will schedule in procedure room with LEEP machine. Uterus: 8 weeks size, without tenderness  Adnexa: normal without masses or tenderness  Perineum: normal  Anus: no hemorhroids  Lymphatic: no palpable pelvic nodes     Joss was seen today for consult.     Diagnoses and all orders for this visit:    Fibroids, intramural    Vaginal mass    Postmenopausal bleeding    Rtc for EMB and polypectomy

## 2024-10-25 ENCOUNTER — APPOINTMENT (OUTPATIENT)
Dept: OTOLARYNGOLOGY | Age: 59
End: 2024-10-25

## 2024-10-25 VITALS — HEIGHT: 59 IN | WEIGHT: 206 LBS | BODY MASS INDEX: 41.53 KG/M2

## 2024-10-25 DIAGNOSIS — K21.9 LARYNGOPHARYNGEAL REFLUX: Primary | ICD-10-CM

## 2024-10-25 DIAGNOSIS — S03.00XD DISLOCATION OF TEMPOROMANDIBULAR JOINT, SUBSEQUENT ENCOUNTER: ICD-10-CM

## 2024-10-25 DIAGNOSIS — J31.0 RHINITIS, UNSPECIFIED TYPE: ICD-10-CM

## 2024-10-25 PROCEDURE — 31575 DIAGNOSTIC LARYNGOSCOPY: CPT | Performed by: OTOLARYNGOLOGY

## 2024-10-25 PROCEDURE — 99214 OFFICE O/P EST MOD 30 MIN: CPT | Performed by: OTOLARYNGOLOGY

## 2024-10-25 RX ORDER — AZELASTINE 1 MG/ML
2 SPRAY, METERED NASAL 2 TIMES DAILY
Qty: 90 ML | Refills: 0 | Status: SHIPPED | OUTPATIENT
Start: 2024-10-25

## 2024-10-25 RX ORDER — FLUTICASONE PROPIONATE 50 MCG
2 SPRAY, SUSPENSION (ML) NASAL DAILY
Qty: 48 G | Refills: 0 | Status: SHIPPED | OUTPATIENT
Start: 2024-10-25

## 2024-10-25 RX ORDER — METHYLPREDNISOLONE 4 MG/1
4 TABLET ORAL SEE ADMIN INSTRUCTIONS
Qty: 21 TABLET | Refills: 0 | Status: SHIPPED | OUTPATIENT
Start: 2024-10-25

## 2024-11-15 ENCOUNTER — HOSPITAL ENCOUNTER (OUTPATIENT)
Dept: GENERAL RADIOLOGY | Facility: HOSPITAL | Age: 59
Discharge: HOME | End: 2024-11-15
Attending: STUDENT IN AN ORGANIZED HEALTH CARE EDUCATION/TRAINING PROGRAM
Payer: MEDICARE

## 2024-11-15 ENCOUNTER — OFFICE VISIT (OUTPATIENT)
Dept: SURGERY | Facility: CLINIC | Age: 59
End: 2024-11-15
Payer: MEDICARE

## 2024-11-15 VITALS
SYSTOLIC BLOOD PRESSURE: 121 MMHG | OXYGEN SATURATION: 97 % | WEIGHT: 200 LBS | HEIGHT: 59 IN | BODY MASS INDEX: 40.32 KG/M2 | HEART RATE: 86 BPM | DIASTOLIC BLOOD PRESSURE: 76 MMHG

## 2024-11-15 DIAGNOSIS — M16.10 HIP ARTHRITIS: ICD-10-CM

## 2024-11-15 DIAGNOSIS — M62.838 MUSCLE SPASM: ICD-10-CM

## 2024-11-15 DIAGNOSIS — R20.0 NUMBNESS IN LEFT LEG: ICD-10-CM

## 2024-11-15 DIAGNOSIS — M54.50 CHRONIC LEFT-SIDED LOW BACK PAIN WITHOUT SCIATICA: ICD-10-CM

## 2024-11-15 DIAGNOSIS — G89.29 CHRONIC LEFT-SIDED LOW BACK PAIN WITHOUT SCIATICA: ICD-10-CM

## 2024-11-15 DIAGNOSIS — M21.70 LEG LENGTH DISCREPANCY: ICD-10-CM

## 2024-11-15 DIAGNOSIS — E66.01 OBESITY, CLASS III, BMI 40-49.9 (MORBID OBESITY) (HCC): ICD-10-CM

## 2024-11-15 DIAGNOSIS — M79.605 LEG PAIN, LEFT: ICD-10-CM

## 2024-11-15 DIAGNOSIS — M21.70 LEG LENGTH DISCREPANCY: Primary | ICD-10-CM

## 2024-11-15 PROCEDURE — 99205 OFFICE O/P NEW HI 60 MIN: CPT | Performed by: STUDENT IN AN ORGANIZED HEALTH CARE EDUCATION/TRAINING PROGRAM

## 2024-11-15 PROCEDURE — 77073 BONE LENGTH STUDIES: CPT | Performed by: STUDENT IN AN ORGANIZED HEALTH CARE EDUCATION/TRAINING PROGRAM

## 2024-11-15 RX ORDER — PREDNISONE 50 MG/1
50 TABLET ORAL DAILY
COMMUNITY
Start: 2024-10-22

## 2024-11-15 RX ORDER — TOPIRAMATE 25 MG/1
TABLET, FILM COATED ORAL
COMMUNITY

## 2024-11-15 RX ORDER — AZITHROMYCIN 250 MG/1
TABLET, FILM COATED ORAL
COMMUNITY
Start: 2024-10-25

## 2024-11-15 RX ORDER — AZELASTINE 1 MG/ML
2 SPRAY, METERED NASAL 2 TIMES DAILY
COMMUNITY
Start: 2024-10-25

## 2024-11-15 RX ORDER — METHYLPREDNISOLONE 4 MG/1
4 TABLET ORAL AS DIRECTED
COMMUNITY
Start: 2024-10-25

## 2024-11-15 RX ORDER — FLUCONAZOLE 150 MG/1
TABLET ORAL
COMMUNITY
Start: 2024-08-25

## 2024-11-15 RX ORDER — CLOTRIMAZOLE 2 G/100G
CREAM VAGINAL
COMMUNITY
Start: 2024-08-21

## 2024-11-15 RX ORDER — PREDNISONE 20 MG/1
40 TABLET ORAL DAILY
COMMUNITY
Start: 2024-08-23

## 2024-11-15 RX ORDER — PROPYLTHIOURACIL 50 MG/1
TABLET ORAL
COMMUNITY

## 2024-11-15 RX ORDER — SUMATRIPTAN SUCCINATE 100 MG/1
TABLET ORAL
COMMUNITY
Start: 2024-07-31

## 2024-11-15 RX ORDER — TEMAZEPAM 15 MG/1
CAPSULE ORAL
COMMUNITY

## 2024-11-15 RX ORDER — METHIMAZOLE 5 MG/1
0.5 TABLET ORAL DAILY
COMMUNITY

## 2024-11-15 RX ORDER — FLURAZEPAM HCL 30 MG
CAPSULE ORAL
COMMUNITY

## 2024-11-15 RX ORDER — TOPIRAMATE 50 MG/1
TABLET, FILM COATED ORAL
COMMUNITY

## 2024-11-15 RX ORDER — TRAZODONE HYDROCHLORIDE 50 MG/1
TABLET, FILM COATED ORAL
COMMUNITY

## 2024-11-15 RX ORDER — CETIRIZINE HYDROCHLORIDE 10 MG/1
10 TABLET ORAL DAILY
COMMUNITY
Start: 2024-07-31

## 2024-11-15 RX ORDER — FUROSEMIDE 20 MG/1
TABLET ORAL
COMMUNITY
Start: 2024-08-17

## 2024-11-15 RX ORDER — MECLIZINE HYDROCHLORIDE 25 MG/1
TABLET ORAL
COMMUNITY
Start: 2024-10-25

## 2024-11-15 RX ORDER — HYDROCHLOROTHIAZIDE 25 MG/1
TABLET ORAL
COMMUNITY

## 2024-11-15 RX ORDER — DICYCLOMINE HCL 20 MG
20 TABLET ORAL 3 TIMES DAILY PRN
COMMUNITY
Start: 2024-08-27

## 2024-11-15 RX ORDER — GABAPENTIN 100 MG/1
CAPSULE ORAL
COMMUNITY
Start: 2024-08-05 | End: 2024-11-15

## 2024-11-15 RX ORDER — METFORMIN HYDROCHLORIDE 500 MG/1
TABLET, EXTENDED RELEASE ORAL
COMMUNITY

## 2024-11-15 NOTE — PATIENT INSTRUCTIONS
Refill policies:    Allow 2-3 business days for refills; controlled substances may take longer.  Contact your pharmacy at least 5 days prior to running out of medication and have them send an electronic request or submit request through the “request refill” option in your [employer] account.  Refills are not addressed on weekends; covering physicians do not authorize routine medications on weekends.  No narcotics or controlled substances are refilled after noon on Fridays or by on call physicians.  By law, narcotics must be electronically prescribed.  A 30 day supply with no refills is the maximum allowed.  If your prescription is due for a refill, you may be due for a follow up appointment.  To best provide you care, patients receiving routine medications need to be seen at least once a year.  Patients receiving narcotic/controlled substance medications need to be seen at least once every 3 months.  In the event that your preferred pharmacy does not have the requested medication in stock (e.g. Backordered), it is your responsibility to find another pharmacy that has the requested medication available.  We will gladly send a new prescription to that pharmacy at your request.    Scheduling Tests:    If your physician has ordered radiology tests such as MRI or CT scans, please contact Central Scheduling at [phone] right away to schedule the test.  Once scheduled, the [facility] Centralized Referral Team will work with your insurance carrier to obtain pre-certification or prior authorization.  Depending on your insurance carrier, approval may take 3-10 days.  It is highly recommended patients assure they have received an authorization before having a test performed.  If test is done without insurance authorization, patient may be responsible for the entire amount billed.      Precertification and Prior Authorizations:  If your physician has recommended that you have a procedure or additional testing performed the [facility]  Centralized Referral Team will contact your insurance carrier to obtain pre-certification or prior authorization.    You are strongly encouraged to contact your insurance carrier to verify that your procedure/test has been approved and is a COVERED benefit.  Although the [facility] Centralized Referral Team does its due diligence, the insurance carrier gives the disclaimer that \"Although the procedure is authorized, this does not guarantee payment.\"    Ultimately the patient is responsible for payment.   Thank you for your understanding in this matter.  Paperwork Completion:  If you require FMLA or disability paperwork for your recovery, please make sure to either drop it off or have it faxed to our office at [phone]. Be sure the form has your name and date of birth on it.  The form will be faxed to our Forms Department and they will complete it for you.  There is a 25$ fee for all forms that need to be filled out.  Please be aware there is a 10-14 day turnaround time.  You will need to sign a release of information (CARLEE) form if your paperwork does not come with one.  You may call the Forms Department with any questions at [phone].  Their fax number is [phone].

## 2024-11-15 NOTE — H&P
[facility]  Neurological Surgery New Patient Clinic Note    Joss Carmen  8/16/1965  [id number]  PCP: Michael Gerardo MD  Referring Provider: Self pain    REASON FOR VISIT:  Low back pain, left buttock pain    HISTORY OF PRESENT ILLNESS:  Joss Carmen is a(n) 59 year old female who presents with chronic left-sided buttock pain radiating down the left leg into the shin, accompanied by numbness and a burning sensation in the left little toe. The pain is described as a throbbing ache, currently rated at 6/10 but escalating to 9-10/10 at times. Symptoms have been ongoing for over a year, with the numbness in the toe persisting throughout this period.    She reports that the pain worsened after her right hip replacement surgery in January 2024. Physical therapy provided temporary relief, but symptoms recurred approximately six weeks post-therapy. The pain is aggravated by prolonged sitting and partially alleviated by heat from hot showers and heating pads. She uses meloxicam and ibuprofen with limited relief and notes that tramadol is no longer effective for her chronic back pain. A muscle relaxant (Flexeril) was previously tried without significant benefit.    She denies significant weakness but mentions stiffness and occasional burning sensations. She also experiences headaches, attributing them to stress from her condition. She is not sure if she has leg length discrepancy since her surgery, but did notice her pants fit differently, and has concerns about flat feet potentially affecting her posture and gait.    PAST MEDICAL HISTORY:  Past Medical History:    Abdominal distention    Abdominal pain    Abnormal uterine bleeding    Amenorrhea    Anxiety state, unspecified    Arthritis    Back pain    Belching    Bloating    Blurred vision    Chronic cough    Constipation    Diabetes mellitus (HCC)    pre diabetic    Dyspareunia    Easy bruising    H/O mammogram    benign  pt stated    H/O mammogram    normal    High cholesterol    Hoarseness, chronic    Leaking of urine    Obstructive sleep apnea (adult) (pediatric)    AHI 5.3 SaO2 cole 81 % autoPAP 6-16 HME    Pain in joints    Pap smear for cervical cancer screening    wnl pt stated    Pap smear for cervical cancer screening    wnl, neg hpv    Pulmonary embolism (HCC)    Sleep disturbance    Stress    Uncomfortable fullness after meals    Wears glasses    Weight gain     PAST SURGICAL HISTORY:  Past Surgical History:   Procedure Laterality Date      ,     Cholecystectomy      Lap adjustable gastric band      Had removed in     Oophorectomy      Other surgical history  2015    benign    Other surgical history Right     R hip replacement    Removal gallbladder      Tubal ligation       FAMILY HISTORY:  family history includes Cancer (age of onset: 40) in her maternal aunt; Colon Polyps in her mother; Diabetes in her brother and father; Heart Attack in her father; Heart Disorder in her father; Hypertension in her father and mother; Lung Disorder in her maternal grandfather.    SOCIAL HISTORY:   reports that she has never smoked. She has never used smokeless tobacco. She reports current alcohol use. She reports that she does not use drugs.    ALLERGIES:  Allergies[1]    MEDICATIONS:  Medications Ordered Prior to Encounter[2]    REVIEW OF SYSTEMS:  All other systems were reviewed and were negative except for those previously mentioned in the HPI    PHYSICAL EXAMINATION:  General: No acute distress.  Respiratory: Non-labored respirations bilaterally. No audible wheezing  Cardiovascular: Extremities warm and well-perfused.  Abdomen: Soft, nontender, nondistended.   Musculoskeletal: Moves all extremities well, symmetrically.  Extremities: No edema.    NEUROLOGIC EXAMINATION:  Mental status: Alert and oriented x 3  Speech: Clear, fluent  Cranial nerves: PERRLA, EOMI, face symmetric, with normal  strength and sensation, tongue and palate midline, SCM 5/5 bilaterally  Motor:     RIGHT  Delt 5/5   Bic 5/5  Tri 5/5   HI 5/5    5/5  IP 5/5   Quad 5/5   Ham 5/5   AT 5/5   EHL 5/5 Saadia 5/5     LEFT    Delt 5/5   Bic 5/5  Tri 5/5   HI 5/5    5/5  IP 5/5   Quad 5/5   Ham 5/5   AT 5/5   EHL 5/5 Saadia 5/5   No pronator drift  Tone: Normal  Atrophy/Fasciculations: None  Sensation: Normal to light touch, symmetric, no neglect  Cerebellar: Normal finger nose finger  Gait: Normal, nondistressed heel toe tandem gait      Reflexes: 2+ throughout, symmetric, no Soraya's    IMAGING:  MRI Lumbar Spine: Reviewed on desktop; shows no evidence of nerve root compression, disc herniation, or significant spinal pathology.  CT Abdomen/Pelvis (August 2024): Incidental findings of normal left hip joint anatomy; right hip prosthesis in place. No significant abnormalities noted in the left hip. No dedicated imaging of the left hip or lower extremity is currently available.    ASSESSMENT:  Ms. Carmen presents with chronic left-sided buttock and leg pain with numbness and burning in the left little toe. Her symptoms suggest possible sciatic nerve irritation; however, lumbar MRI is unremarkable for nerve root compression. The pain is likely multifactorial, potentially due to left-sided muscle spasm and inflammation exacerbated by a leg length discrepancy following right total hip replacement. This asymmetry may be causing altered biomechanics, leading to overuse of the left psoas and piriformis muscles, contributing to her symptoms. Flat feet and obesity may further affect her posture and gait, compounding muscle strain and discomfort. There is no evidence of left hip joint pathology on current imaging.    PLAN:  Imaging:  Order standing full-length bilateral lower extremity X-rays to assess for leg length discrepancy.  If leg lengths are equal, consider an MRI of the pelvis to evaluate for soft tissue abnormalities such as  piriformis syndrome or muscle inflammation.  If leg length discrepancy confirmed will refer to orthotics for custom orthotics or shoe lifts.    Weight loss:  Discussed weight management to reduce mechanical stress on the musculoskeletal system.    Pain Management:  Continue meloxicam for its anti-inflammatory effects.  Limit ibuprofen use due to gastrointestinal concerns.    Patient Education:  Explained the potential impact of leg length discrepancy and flat feet on posture and muscle strain.  Discussed the role of muscle spasms and inflammation in her symptoms.    Follow-Up:  Pending imaging results.    Padilla Nation MD  Neurological Surgery    [facility] [address], [address]  [place] [address]  [phone]  Pager [phone]  11/15/2024 12:28 PM      This note was created using a voice-recognition transcribing system. Incorrect words or phrases may have been missed during proofreading. Please interpret accordingly.    Total Time    New Patient Total Time       75  minutes.      Activities       Preparing to see the patient (chart/tests/imaging review).       Obtaining and/or reviewing separately obtained history.       Performing a medically appropriate examination and/or evaluation.       Counseling and educating the patient/family/caregiver.       Ordering medications, tests, or procedures.       Referring and communicating with other health care professionals (when not separately reported).       Documenting clincal information in the electronic or other health record.       Independently interpreting results (not separately reported).    Communicating results to the patient/family/caregiver.    Care coordination (not separately reported).       [1]   Allergies  Allergen Reactions    Morphine ANAPHYLAXIS, OTHER (SEE COMMENTS) and SHORTNESS OF BREATH     Derivatives SOB    Following a surgical procedure.   Pt. States felt SOB.  Derivatives SOB  Pt. States  felt SOB.      Hydromorphone OTHER (SEE COMMENTS)     States not allergic      Metformin OTHER (SEE COMMENTS)     weakness   [2]   Current Outpatient Medications on File Prior to Visit   Medication Sig Dispense Refill    SUMAtriptan Succinate 100 MG Oral Tab TAKE 1 TABLET BY MOUTH 2 TIMES PER DAY WITH AT LEAST 2 HOURS BETWEEN DOSES AS NEEDED      predniSONE 50 MG Oral Tab Take 1 tablet (50 mg total) by mouth daily.      predniSONE 20 MG Oral Tab Take 2 tablets (40 mg total) by mouth daily.      methylPREDNISolone 4 MG Oral Tablet Therapy Pack Take 1 tablet (4 mg total) by mouth As Directed. FOLLOW DIRECTIONS ON PACKAGING      meclizine 25 MG Oral Tab       gabapentin 100 MG Oral Cap       furosemide 20 MG Oral Tab TAKE 1 TABLET BY MOUTH EVERY MORNING X 5 DAYS      fluconazole 150 MG Oral Tab TAKE 1 TABLET BY MOUTH EVERY OTHER DAY FOR 3 DOSES AS NEEDED FOR YEAST INFECTION      dicyclomine 20 MG Oral Tab Take 1 tablet (20 mg total) by mouth 3 (three) times daily as needed.      3 DAY VAGINAL 2 % Vaginal Cream APPLY 1 APPLICATORFUL VAGINALLY DAILY X 3 DAYS      cetirizine 10 MG Oral Tab Take 1 tablet (10 mg total) by mouth daily.      azithromycin 250 MG Oral Tab       azelastine 0.1 % Nasal Solution 2 sprays by Nasal route 2 (two) times daily.      Triamterene (DYRENIUM OR)       traZODone 50 MG Oral Tab       topiramate 50 MG Oral Tab       topiramate 25 MG Oral Tab       temazepam 15 MG Oral Cap TK ONE C PO HS      propylthiouracil 50 MG Oral Tab       metFORMIN  MG Oral Tablet 24 Hr       methIMAzole 5 MG Oral Tab Take 0.5 tablets (2.5 mg total) by mouth daily.      hydroCHLOROthiazide 25 MG Oral Tab       flurazepam 30 MG Oral Cap TK ONE C PO Q NIGHT      lidocaine 5 % External Patch Place 2 patches onto the skin daily. 14 each 0    amoxicillin clavulanate 500-125 MG Oral Tab  (Patient not taking: Reported on 8/25/2024)      ELIQUIS 5 MG Oral Tab Take 1 tablet (5 mg total) by mouth 2 (two) times daily.  (Patient not taking: Reported on 8/25/2024)      atorvastatin 10 MG Oral Tab Take 1 tablet (10 mg total) by mouth daily.      dabigatran 150 MG Oral Cap Take 1 capsule (150 mg total) by mouth 2 (two) times daily.      docusate sodium 100 MG Oral Cap Take 1 capsule (100 mg total) by mouth 2 (two) times daily as needed. (Patient not taking: Reported on 8/25/2024)      famotidine 20 MG Oral Tab Take 1 tablet (20 mg total) by mouth 2 (two) times daily. (Patient not taking: Reported on 8/25/2024)      FEROSUL 325 (65 Fe) MG Oral Tab Take 1 tablet (325 mg total) by mouth 2 (two) times daily with meals.      fluticasone propionate 50 MCG/ACT Nasal Suspension       hyoscyamine sulfate 0.125 MG Oral Tablet Dispersible       XARELTO 20 MG Oral Tab Take 1 tablet (20 mg total) by mouth daily with food. (Patient not taking: Reported on 8/25/2024)      simvastatin 20 MG Oral Tab Take 1 tablet (20 mg total) by mouth daily.      sulfamethoxazole-trimethoprim -160 MG Oral Tab per tablet Take 1 tablet by mouth 2 (two) times daily. (Patient not taking: Reported on 8/25/2024)      mometasone 0.1 % External Ointment Apply 1 Application topically daily. 1 each 0    sucralfate 1 g Oral Tab Take 1 tablet (1 g total) by mouth 4 (four) times daily before meals and nightly. (Patient not taking: Reported on 8/25/2024) 120 tablet 0    linaCLOtide (LINZESS) 290 MCG Oral Cap Take 1 capsule (290 mcg total) by mouth daily. 90 capsule 3    Esomeprazole Magnesium 40 MG Oral Capsule Delayed Release Take 1 capsule (40 mg total) by mouth every morning before breakfast. 30 minutes before breakfast. 90 capsule 3    aspirin-acetaminophen-caffeine 250-250-65 MG Oral Tab Every 6 Hours as needed for Headache (Patient not taking: Reported on 8/25/2024)      Cholecalciferol (VITAMIN D3) 1.25 MG (01836 UT) Oral Cap Take 1 capsule by mouth.      ondansetron 4 MG Oral Tablet Dispersible PLACE 1 TABLET BY MOUTH AND LET DISSOLVE EVERY 8 HOURS AS NEEDED FOR  NAUSEAS AND VOMITING      traMADol 50 MG Oral Tab Take 1 tablet (50 mg total) by mouth every 12 (twelve) hours as needed.      Meloxicam 15 MG Oral Tab Take 1 tablet (15 mg total) by mouth daily.      Calcium Ascorbate 500 MG Oral Tab Take by mouth.      Ibuprofen 200 MG Oral Cap 4 capsules (800 mg total).      aspirin 81 MG Oral Tab Take 1 tablet (81 mg total) by mouth daily.      Triamterene-HCTZ 37.5-25 MG Oral Tab Take 1 tablet by mouth as needed.      Probiotic Product (VSL#3) Oral Cap Take by mouth.      ClonazePAM 1 MG Oral Tab Take 1 tablet (1 mg total) by mouth nightly as needed for Anxiety.       No current facility-administered medications on file prior to visit.

## 2024-11-15 NOTE — PROGRESS NOTES
New patient:  Reason for visit: Left buttock radiating into left leg into shin. Numbness and tingling and burning sensation in left toes. Patient had right hip surgery 01/2024.       Estimated time of onset: Last year      Numeric Rating Scale:      Pain at Present:  6/10                                                                                                                       Distribution of Pain:  Left     Past Treatments for Current Pain Condition:    Surgery: None   Physical Therapy: Some relief - Not currently and heating pad and hot showers.   Injections: None   Medications: Tramadol and Meloxicam     Prior diagnostic testing related to this condition:  MRI Lumbar unable to import into PACS. Physician will review on desktop.

## 2024-11-18 NOTE — PROGRESS NOTES
XR leg surveys 11/15/2024: 4 mm leg length discrepancy, left taller than the right.       Will refer to orthotics.     Padilla Nation MD  Neurological Surgery    [address], [address] [place] [id number]  [phone]  Pager [phone]  11/18/2024 6:10 AM      This note was created using a voice-recognition transcribing system. Incorrect words or phrases may have been missed during proofreading. Please interpret accordingly.

## 2024-12-20 ENCOUNTER — APPOINTMENT (OUTPATIENT)
Dept: CT IMAGING | Age: 59
End: 2024-12-20
Attending: EMERGENCY MEDICINE
Payer: MEDICARE

## 2024-12-20 ENCOUNTER — HOSPITAL ENCOUNTER (EMERGENCY)
Age: 59
Discharge: HOME | End: 2024-12-20
Attending: EMERGENCY MEDICINE
Payer: MEDICARE

## 2024-12-20 VITALS
TEMPERATURE: 99 F | OXYGEN SATURATION: 99 % | RESPIRATION RATE: 18 BRPM | HEART RATE: 83 BPM | SYSTOLIC BLOOD PRESSURE: 139 MMHG | BODY MASS INDEX: 41.12 KG/M2 | DIASTOLIC BLOOD PRESSURE: 88 MMHG | HEIGHT: 59 IN | WEIGHT: 204 LBS

## 2024-12-20 DIAGNOSIS — N89.8 ITCHING IN THE VAGINAL AREA: Primary | ICD-10-CM

## 2024-12-20 DIAGNOSIS — M54.32 SCIATICA OF LEFT SIDE: ICD-10-CM

## 2024-12-20 LAB
BILIRUB UR QL STRIP.AUTO: NEGATIVE
CLARITY UR REFRACT.AUTO: CLEAR
COLOR UR AUTO: YELLOW
GLUCOSE UR STRIP.AUTO-MCNC: NEGATIVE MG/DL
KETONES UR STRIP.AUTO-MCNC: NEGATIVE MG/DL
LEUKOCYTE ESTERASE UR QL STRIP.AUTO: NEGATIVE
NITRITE UR QL STRIP.AUTO: NEGATIVE
PH UR STRIP.AUTO: 7 [PH] (ref 5–8)
PROT UR STRIP.AUTO-MCNC: NEGATIVE MG/DL
RBC UR QL AUTO: NEGATIVE
SP GR UR STRIP.AUTO: 1.01 (ref 1–1.03)
UROBILINOGEN UR STRIP.AUTO-MCNC: 0.2 MG/DL

## 2024-12-20 PROCEDURE — 74176 CT ABD & PELVIS W/O CONTRAST: CPT | Performed by: EMERGENCY MEDICINE

## 2024-12-20 PROCEDURE — 99284 EMERGENCY DEPT VISIT MOD MDM: CPT

## 2024-12-20 PROCEDURE — 81003 URINALYSIS AUTO W/O SCOPE: CPT | Performed by: EMERGENCY MEDICINE

## 2024-12-20 PROCEDURE — 81514 NFCT DS BV&VAGINITIS DNA ALG: CPT | Performed by: EMERGENCY MEDICINE

## 2024-12-20 PROCEDURE — 81003 URINALYSIS AUTO W/O SCOPE: CPT

## 2024-12-20 RX ORDER — FLUCONAZOLE 150 MG/1
150 TABLET ORAL ONCE
Qty: 1 TABLET | Refills: 0 | Status: SHIPPED | OUTPATIENT
Start: 2024-12-20 | End: 2024-12-30 | Stop reason: CLARIF

## 2024-12-20 NOTE — ED INITIAL ASSESSMENT (HPI)
Patient here with pelvic pain, urinary discomfort, left back pain over the past week. Also notes lower back pain that radiates down leg. Feels nauseated. Denies fever. Seen at urgent care and started on macrobid, symptoms have gotten worse.

## 2024-12-21 LAB
BV BACTERIA DNA VAG QL NAA+PROBE: NEGATIVE
C GLABRATA DNA VAG QL NAA+PROBE: NEGATIVE
C KRUSEI DNA VAG QL NAA+PROBE: NEGATIVE
CANDIDA DNA VAG QL NAA+PROBE: NEGATIVE
T VAGINALIS DNA VAG QL NAA+PROBE: NEGATIVE

## 2024-12-21 NOTE — ED PROVIDER NOTES
Patient Seen in: [facility] In [place]      History     Chief Complaint   Patient presents with    Urinary Symptoms    Pain     Stated Complaint: pelvic pain and back pain, urinary frequency, radiating pain from buttox down L*    Subjective:   HPI      Patient presents with multiple complaints.  The patient states that for the past week she has been having some vaginal itching and burning.  She states at times she has some slight yellow discharge.  She feels like there may be an issue with dryness in the vagina.  She is not sexually active.  She feels pain also in her back, both in the flank and in the low back.  She denies urinary symptoms but was seen at urgent care and put on Macrobid for UTI.  She feels like it is not helping.  She denies fevers or chills.  She is also complaining of pain in the left buttocks that she thinks may be sciatica.  She has not had any vomiting and reports normal bowel movements.    Objective:     Past Medical History:    Abdominal distention    Abdominal pain    Abnormal uterine bleeding    Amenorrhea    Anxiety state, unspecified    Arthritis    Back pain    Belching    Bloating    Blurred vision    Chronic cough    Constipation    Diabetes mellitus (HCC)    pre diabetic    Dyspareunia    Easy bruising    H/O mammogram    benign pt stated    H/O mammogram    normal    High cholesterol    Hoarseness, chronic    Leaking of urine    Obstructive sleep apnea (adult) (pediatric)    AHI 5.3 SaO2 cole 81 % autoPAP 6-16 HME    Pain in joints    Pap smear for cervical cancer screening    wnl pt stated    Pap smear for cervical cancer screening    wnl, neg hpv    Pulmonary embolism (HCC)    Sleep disturbance    Stress    Uncomfortable fullness after meals    Wears glasses    Weight gain              Past Surgical History:   Procedure Laterality Date      ,     Cholecystectomy      Lap adjustable gastric band      Had removed in      Oophorectomy  2000    Other surgical history  03/2015    benign    Other surgical history Right 2023    R hip replacement    Removal gallbladder      Tubal ligation  2000                Social History     Socioeconomic History    Marital status:    Tobacco Use    Smoking status: Never    Smokeless tobacco: Never   Vaping Use    Vaping status: Never Used   Substance and Sexual Activity    Alcohol use: Yes    Drug use: Never    Sexual activity: Not Currently     Partners: Male   Other Topics Concern    Caffeine Concern Yes     Comment: tea occ    Exercise Yes     Comment: trys to walk on treadmill, biking     Social Drivers of Health      [facility], [facility]    Social Connections    [facility]                  Physical Exam     ED Triage Vitals [12/20/24 1633]   BP (!) 167/92   Pulse 95   Resp 20   Temp 98.6 °F (37 °C)   Temp src Oral   SpO2 97 %   O2 Device None (Room air)       Current Vitals:   Vital Signs  BP: 139/88  Pulse: 83  Resp: 18  Temp: 98.6 °F (37 °C)  Temp src: Oral    Oxygen Therapy  SpO2: 99 %  O2 Device: None (Room air)        Physical Exam  General: Alert and oriented x3 in no acute distress.  Cardiovascular: Regular rate and rhythm, no murmurs.  Respiratory: Lungs clear to auscultation.  Abdomen: Soft, nontender, no rebound or guarding, normal active bowel sounds, no CVA tenderness.  Pelvic: No lesions in the perineum, no significant vaginal discharge and cervix is normal in appearance.  Extremities: No CCE.  Skin: Warm and dry.  Neurologic: Sensory and motor exams intact in the lower extremities.    ED Course     Labs Reviewed   URINALYSIS WITH CULTURE REFLEX   VAGINITIS VAGINOSIS PCR PANEL           MDM      Patient presents with vaginal irritation and back pain.  Differential diagnosis includes but is not limited to urinary tract infection, yeast vaginitis, atrophic vaginitis,  ureterolithiasis and musculoskeletal pain.  The patient has a fairly benign pelvic exam.  Swabs were sent for a vaginitis panel and given the symptoms I will give her prescription for Diflucan to see if that will help.  She does not have evidence of a urinary tract infection on her UA.  Her CT does not show any ureteral lithiasis.  I think she should follow-up with gynecology for repeat evaluation if her vaginal symptoms persist.  She mentioned possible sciatica and requested referral to spine.  In her chart it appears that she has already seen spine about this issue.        MDM    Disposition and Plan     Clinical Impression:  1. Itching in the vaginal area    2. Sciatica of left side         Disposition:  Discharge  12/20/2024  9:48 pm    Follow-up:  Michael Gerardo MD  [address] [facility] [address]  [facility] [address]  [phone]    Follow up      Mina Darling MD  [address] [facility] [facility] [address]  [facility] [address]  [phone]    Call in 3 day(s)      Marilee Rivera MD  [address] [facility] [address]  [facility] [id number] [phone]    Call in 3 day(s)            Medications Prescribed:  Current Discharge Medication List        START taking these medications    Details   !! fluconazole 150 MG Oral Tab Take 1 tablet (150 mg total) by mouth once for 1 dose.  Qty: 1 tablet, Refills: 0       !! - Potential duplicate medications found. Please discuss with provider.              Supplementary Documentation:

## 2024-12-23 ENCOUNTER — HOSPITAL ENCOUNTER (EMERGENCY)
Age: 59
Discharge: HOME | End: 2024-12-23
Attending: EMERGENCY MEDICINE

## 2024-12-23 VITALS
OXYGEN SATURATION: 97 % | SYSTOLIC BLOOD PRESSURE: 148 MMHG | WEIGHT: 208.56 LBS | RESPIRATION RATE: 16 BRPM | HEIGHT: 59 IN | BODY MASS INDEX: 42.04 KG/M2 | DIASTOLIC BLOOD PRESSURE: 88 MMHG | TEMPERATURE: 97.5 F | HEART RATE: 83 BPM

## 2024-12-23 DIAGNOSIS — S39.012A STRAIN OF LUMBAR REGION, INITIAL ENCOUNTER: Primary | ICD-10-CM

## 2024-12-23 LAB
ALBUMIN SERPL-MCNC: 3.5 G/DL (ref 3.4–5)
ALBUMIN/GLOB SERPL: 0.9 {RATIO} (ref 1–2.4)
ALP SERPL-CCNC: 74 UNITS/L (ref 45–117)
ALT SERPL-CCNC: 19 UNITS/L
ANION GAP SERPL CALC-SCNC: 7 MMOL/L (ref 7–19)
APPEARANCE UR: CLEAR
AST SERPL-CCNC: 19 UNITS/L
BACTERIA #/AREA URNS HPF: NORMAL /HPF
BASOPHILS # BLD: 0 K/MCL (ref 0–0.3)
BASOPHILS NFR BLD: 1 %
BILIRUB SERPL-MCNC: 0.3 MG/DL (ref 0.2–1)
BILIRUB UR QL STRIP: NEGATIVE
BUN SERPL-MCNC: 13 MG/DL (ref 6–20)
BUN/CREAT SERPL: 25 (ref 7–25)
CALCIUM SERPL-MCNC: 9.5 MG/DL (ref 8.4–10.2)
CHLORIDE SERPL-SCNC: 105 MMOL/L (ref 97–110)
CO2 SERPL-SCNC: 29 MMOL/L (ref 21–32)
COLOR UR: COLORLESS
CREAT SERPL-MCNC: 0.53 MG/DL (ref 0.51–0.95)
DEPRECATED RDW RBC: 42.5 FL (ref 39–50)
EGFRCR SERPLBLD CKD-EPI 2021: >90 ML/MIN/{1.73_M2}
EOSINOPHIL # BLD: 0.1 K/MCL (ref 0–0.5)
EOSINOPHIL NFR BLD: 2 %
ERYTHROCYTE [DISTWIDTH] IN BLOOD: 12.1 % (ref 11–15)
FASTING DURATION TIME PATIENT: ABNORMAL H
GLOBULIN SER-MCNC: 3.9 G/DL (ref 2–4)
GLUCOSE SERPL-MCNC: 91 MG/DL (ref 70–99)
GLUCOSE UR STRIP-MCNC: NEGATIVE MG/DL
HCT VFR BLD CALC: 38.1 % (ref 36–46.5)
HGB BLD-MCNC: 12.6 G/DL (ref 12–15.5)
HGB UR QL STRIP: NEGATIVE
HYALINE CASTS #/AREA URNS LPF: NORMAL /LPF
IMM GRANULOCYTES # BLD AUTO: 0 K/MCL (ref 0–0.2)
IMM GRANULOCYTES # BLD: 0 %
KETONES UR STRIP-MCNC: NEGATIVE MG/DL
LEUKOCYTE ESTERASE UR QL STRIP: NEGATIVE
LIPASE SERPL-CCNC: 19 UNITS/L (ref 15–77)
LYMPHOCYTES # BLD: 4 K/MCL (ref 1–4)
LYMPHOCYTES NFR BLD: 47 %
MCH RBC QN AUTO: 31 PG (ref 26–34)
MCHC RBC AUTO-ENTMCNC: 33.1 G/DL (ref 32–36.5)
MCV RBC AUTO: 93.6 FL (ref 78–100)
MONOCYTES # BLD: 0.5 K/MCL (ref 0.3–0.9)
MONOCYTES NFR BLD: 6 %
MUCOUS THREADS URNS QL MICRO: PRESENT
NEUTROPHILS # BLD: 3.6 K/MCL (ref 1.8–7.7)
NEUTROPHILS NFR BLD: 44 %
NITRITE UR QL STRIP: NEGATIVE
NRBC BLD MANUAL-RTO: 0 /100 WBC
PH UR STRIP: 6 [PH] (ref 5–7)
PLATELET # BLD AUTO: 264 K/MCL (ref 140–450)
POTASSIUM SERPL-SCNC: 3.9 MMOL/L (ref 3.4–5.1)
PROT SERPL-MCNC: 7.4 G/DL (ref 6.4–8.2)
PROT UR STRIP-MCNC: NEGATIVE MG/DL
RAINBOW EXTRA TUBES HOLD SPECIMEN: NORMAL
RBC # BLD: 4.07 MIL/MCL (ref 4–5.2)
RBC #/AREA URNS HPF: NORMAL /HPF
SODIUM SERPL-SCNC: 137 MMOL/L (ref 135–145)
SP GR UR STRIP: 1.01 (ref 1–1.03)
SQUAMOUS #/AREA URNS HPF: NORMAL /HPF
UROBILINOGEN UR STRIP-MCNC: 0.2 MG/DL
WBC # BLD: 8.3 K/MCL (ref 4.2–11)
WBC #/AREA URNS HPF: NORMAL /HPF

## 2024-12-23 PROCEDURE — 80053 COMPREHEN METABOLIC PANEL: CPT | Performed by: EMERGENCY MEDICINE

## 2024-12-23 PROCEDURE — 81001 URINALYSIS AUTO W/SCOPE: CPT | Performed by: EMERGENCY MEDICINE

## 2024-12-23 PROCEDURE — 85025 COMPLETE CBC W/AUTO DIFF WBC: CPT | Performed by: EMERGENCY MEDICINE

## 2024-12-23 PROCEDURE — 10002800 HB RX 250 W HCPCS: Performed by: EMERGENCY MEDICINE

## 2024-12-23 PROCEDURE — 96374 THER/PROPH/DIAG INJ IV PUSH: CPT

## 2024-12-23 PROCEDURE — 99284 EMERGENCY DEPT VISIT MOD MDM: CPT

## 2024-12-23 PROCEDURE — 96375 TX/PRO/DX INJ NEW DRUG ADDON: CPT

## 2024-12-23 PROCEDURE — 83690 ASSAY OF LIPASE: CPT | Performed by: EMERGENCY MEDICINE

## 2024-12-23 RX ORDER — LIDOCAINE 50 MG/G
1 PATCH TOPICAL EVERY 24 HOURS
Qty: 30 PATCH | Refills: 0 | Status: SHIPPED | OUTPATIENT
Start: 2024-12-23

## 2024-12-23 RX ORDER — KETOROLAC TROMETHAMINE 15 MG/ML
30 INJECTION, SOLUTION INTRAMUSCULAR; INTRAVENOUS ONCE
Status: COMPLETED | OUTPATIENT
Start: 2024-12-23 | End: 2024-12-23

## 2024-12-23 RX ORDER — NAPROXEN 500 MG/1
500 TABLET ORAL 2 TIMES DAILY PRN
Qty: 14 TABLET | Refills: 0 | Status: SHIPPED | OUTPATIENT
Start: 2024-12-23 | End: 2024-12-30

## 2024-12-23 RX ORDER — ONDANSETRON 8 MG/1
8 TABLET, ORALLY DISINTEGRATING ORAL EVERY 8 HOURS PRN
Qty: 12 TABLET | Refills: 0 | Status: SHIPPED | OUTPATIENT
Start: 2024-12-23

## 2024-12-23 RX ORDER — ONDANSETRON 2 MG/ML
4 INJECTION INTRAMUSCULAR; INTRAVENOUS ONCE
Status: COMPLETED | OUTPATIENT
Start: 2024-12-23 | End: 2024-12-23

## 2024-12-23 RX ADMIN — KETOROLAC TROMETHAMINE 30 MG: 15 INJECTION, SOLUTION INTRAMUSCULAR; INTRAVENOUS at 10:49

## 2024-12-23 RX ADMIN — ONDANSETRON 4 MG: 2 INJECTION INTRAMUSCULAR; INTRAVENOUS at 10:49

## 2024-12-23 ASSESSMENT — PAIN SCALES - GENERAL: PAINLEVEL_OUTOF10: 8

## 2024-12-23 ASSESSMENT — PAIN DESCRIPTION - PAIN TYPE: TYPE: ACUTE PAIN

## 2024-12-30 ENCOUNTER — HOSPITAL ENCOUNTER (OUTPATIENT)
Facility: HOSPITAL | Age: 59
Setting detail: OBSERVATION
Discharge: HOME | End: 2024-12-31
Attending: STUDENT IN AN ORGANIZED HEALTH CARE EDUCATION/TRAINING PROGRAM | Admitting: INTERNAL MEDICINE
Payer: MEDICARE

## 2024-12-30 ENCOUNTER — APPOINTMENT (OUTPATIENT)
Dept: GENERAL RADIOLOGY | Facility: HOSPITAL | Age: 59
End: 2024-12-30
Attending: STUDENT IN AN ORGANIZED HEALTH CARE EDUCATION/TRAINING PROGRAM
Payer: MEDICARE

## 2024-12-30 ENCOUNTER — APPOINTMENT (OUTPATIENT)
Dept: CT IMAGING | Facility: HOSPITAL | Age: 59
End: 2024-12-30
Attending: STUDENT IN AN ORGANIZED HEALTH CARE EDUCATION/TRAINING PROGRAM
Payer: MEDICARE

## 2024-12-30 DIAGNOSIS — A41.89 SEPSIS DUE TO OTHER ETIOLOGY (HCC): ICD-10-CM

## 2024-12-30 DIAGNOSIS — J10.1 INFLUENZA A: Primary | ICD-10-CM

## 2024-12-30 PROBLEM — R10.9 FLANK PAIN: Status: ACTIVE | Noted: 2024-12-30

## 2024-12-30 PROBLEM — J11.1 INFLUENZA: Status: ACTIVE | Noted: 2024-12-30

## 2024-12-30 LAB
ALBUMIN SERPL-MCNC: 4.4 G/DL (ref 3.2–4.8)
ALBUMIN/GLOB SERPL: 1.5 {RATIO} (ref 1–2)
ALP LIVER SERPL-CCNC: 71 U/L
ALT SERPL-CCNC: 39 U/L
ANION GAP SERPL CALC-SCNC: 7 MMOL/L (ref 0–18)
AST SERPL-CCNC: 42 U/L (ref ?–34)
ATRIAL RATE: 106 BPM
BASOPHILS # BLD AUTO: 0.03 X10(3) UL (ref 0–0.2)
BASOPHILS NFR BLD AUTO: 0.3 %
BILIRUB SERPL-MCNC: 0.2 MG/DL (ref 0.3–1.2)
BILIRUB UR QL STRIP.AUTO: NEGATIVE
BUN BLD-MCNC: 8 MG/DL (ref 9–23)
CALCIUM BLD-MCNC: 9.8 MG/DL (ref 8.7–10.4)
CHLORIDE SERPL-SCNC: 98 MMOL/L (ref 98–112)
CLARITY UR REFRACT.AUTO: CLEAR
CO2 SERPL-SCNC: 32 MMOL/L (ref 21–32)
COLOR UR AUTO: COLORLESS
CREAT BLD-MCNC: 0.71 MG/DL
EGFRCR SERPLBLD CKD-EPI 2021: 98 ML/MIN/1.73M2 (ref 60–?)
EOSINOPHIL # BLD AUTO: 0.02 X10(3) UL (ref 0–0.7)
EOSINOPHIL NFR BLD AUTO: 0.2 %
ERYTHROCYTE [DISTWIDTH] IN BLOOD BY AUTOMATED COUNT: 12.4 %
EST. AVERAGE GLUCOSE BLD GHB EST-MCNC: 140 MG/DL (ref 68–126)
FLUAV + FLUBV RNA SPEC NAA+PROBE: NEGATIVE
FLUAV + FLUBV RNA SPEC NAA+PROBE: POSITIVE
GLOBULIN PLAS-MCNC: 2.9 G/DL (ref 2–3.5)
GLUCOSE BLD-MCNC: 111 MG/DL (ref 70–99)
GLUCOSE BLD-MCNC: 117 MG/DL (ref 70–99)
GLUCOSE UR STRIP.AUTO-MCNC: NORMAL MG/DL
HBA1C MFR BLD: 6.5 % (ref ?–5.7)
HCT VFR BLD AUTO: 34.5 %
HGB BLD-MCNC: 11.6 G/DL
IMM GRANULOCYTES # BLD AUTO: 0.06 X10(3) UL (ref 0–1)
IMM GRANULOCYTES NFR BLD: 0.6 %
KETONES UR STRIP.AUTO-MCNC: NEGATIVE MG/DL
LACTATE SERPL-SCNC: 1.8 MMOL/L (ref 0.5–2)
LACTATE SERPL-SCNC: 2.4 MMOL/L (ref 0.5–2)
LACTATE SERPL-SCNC: 2.6 MMOL/L (ref 0.5–2)
LACTATE SERPL-SCNC: 2.8 MMOL/L (ref 0.5–2)
LEUKOCYTE ESTERASE UR QL STRIP.AUTO: NEGATIVE
LYMPHOCYTES # BLD AUTO: 0.96 X10(3) UL (ref 1–4)
LYMPHOCYTES NFR BLD AUTO: 9.5 %
MCH RBC QN AUTO: 31.2 PG (ref 26–34)
MCHC RBC AUTO-ENTMCNC: 33.6 G/DL (ref 31–37)
MCV RBC AUTO: 92.7 FL
MONOCYTES # BLD AUTO: 0.47 X10(3) UL (ref 0.1–1)
MONOCYTES NFR BLD AUTO: 4.7 %
NEUTROPHILS # BLD AUTO: 8.55 X10 (3) UL (ref 1.5–7.7)
NEUTROPHILS # BLD AUTO: 8.55 X10(3) UL (ref 1.5–7.7)
NEUTROPHILS NFR BLD AUTO: 84.7 %
NITRITE UR QL STRIP.AUTO: NEGATIVE
NT-PROBNP SERPL-MCNC: 251 PG/ML (ref ?–125)
OSMOLALITY SERPL CALC.SUM OF ELEC: 283 MOSM/KG (ref 275–295)
P AXIS: 62 DEGREES
P-R INTERVAL: 132 MS
PH UR STRIP.AUTO: 6.5 [PH] (ref 5–8)
PLATELET # BLD AUTO: 235 10(3)UL (ref 150–450)
POTASSIUM SERPL-SCNC: 3.7 MMOL/L (ref 3.5–5.1)
PROT SERPL-MCNC: 7.3 G/DL (ref 5.7–8.2)
PROT UR STRIP.AUTO-MCNC: NEGATIVE MG/DL
Q-T INTERVAL: 326 MS
QRS DURATION: 92 MS
QTC CALCULATION (BEZET): 433 MS
R AXIS: 33 DEGREES
RBC # BLD AUTO: 3.72 X10(6)UL
RBC UR QL AUTO: NEGATIVE
RSV RNA SPEC NAA+PROBE: NEGATIVE
SARS-COV-2 RNA RESP QL NAA+PROBE: NOT DETECTED
SODIUM SERPL-SCNC: 137 MMOL/L (ref 136–145)
SP GR UR STRIP.AUTO: <1.005 (ref 1–1.03)
T AXIS: 55 DEGREES
TROPONIN I SERPL HS-MCNC: 21 NG/L
UROBILINOGEN UR STRIP.AUTO-MCNC: NORMAL MG/DL
VENTRICULAR RATE: 106 BPM
WBC # BLD AUTO: 10.1 X10(3) UL (ref 4–11)

## 2024-12-30 PROCEDURE — 71045 X-RAY EXAM CHEST 1 VIEW: CPT | Performed by: STUDENT IN AN ORGANIZED HEALTH CARE EDUCATION/TRAINING PROGRAM

## 2024-12-30 PROCEDURE — 99223 1ST HOSP IP/OBS HIGH 75: CPT | Performed by: INTERNAL MEDICINE

## 2024-12-30 PROCEDURE — 74177 CT ABD & PELVIS W/CONTRAST: CPT | Performed by: STUDENT IN AN ORGANIZED HEALTH CARE EDUCATION/TRAINING PROGRAM

## 2024-12-30 PROCEDURE — 71275 CT ANGIOGRAPHY CHEST: CPT | Performed by: STUDENT IN AN ORGANIZED HEALTH CARE EDUCATION/TRAINING PROGRAM

## 2024-12-30 RX ORDER — ONDANSETRON 2 MG/ML
4 INJECTION INTRAMUSCULAR; INTRAVENOUS EVERY 6 HOURS PRN
Status: DISCONTINUED | OUTPATIENT
Start: 2024-12-30 | End: 2024-12-31

## 2024-12-30 RX ORDER — KETOROLAC TROMETHAMINE 15 MG/ML
15 INJECTION, SOLUTION INTRAMUSCULAR; INTRAVENOUS ONCE
Status: COMPLETED | OUTPATIENT
Start: 2024-12-30 | End: 2024-12-30

## 2024-12-30 RX ORDER — ACETAMINOPHEN 500 MG
1000 TABLET ORAL ONCE
Status: COMPLETED | OUTPATIENT
Start: 2024-12-30 | End: 2024-12-30

## 2024-12-30 RX ORDER — ONDANSETRON 2 MG/ML
4 INJECTION INTRAMUSCULAR; INTRAVENOUS ONCE
Status: COMPLETED | OUTPATIENT
Start: 2024-12-30 | End: 2024-12-30

## 2024-12-30 RX ORDER — PANTOPRAZOLE SODIUM 40 MG/1
40 TABLET, DELAYED RELEASE ORAL
Status: DISCONTINUED | OUTPATIENT
Start: 2024-12-31 | End: 2024-12-31

## 2024-12-30 RX ORDER — DICYCLOMINE HCL 20 MG
20 TABLET ORAL 3 TIMES DAILY PRN
Status: DISCONTINUED | OUTPATIENT
Start: 2024-12-30 | End: 2024-12-31

## 2024-12-30 RX ORDER — SODIUM CHLORIDE 9 MG/ML
INJECTION, SOLUTION INTRAVENOUS CONTINUOUS
Status: DISCONTINUED | OUTPATIENT
Start: 2024-12-30 | End: 2024-12-31

## 2024-12-30 RX ORDER — TRAMADOL HYDROCHLORIDE 50 MG/1
50 TABLET ORAL EVERY 12 HOURS PRN
Status: DISCONTINUED | OUTPATIENT
Start: 2024-12-30 | End: 2024-12-31

## 2024-12-30 RX ORDER — OSELTAMIVIR PHOSPHATE 75 MG/1
75 CAPSULE ORAL ONCE
Status: COMPLETED | OUTPATIENT
Start: 2024-12-30 | End: 2024-12-30

## 2024-12-30 RX ORDER — NICOTINE POLACRILEX 4 MG
15 LOZENGE BUCCAL
Status: DISCONTINUED | OUTPATIENT
Start: 2024-12-30 | End: 2024-12-31

## 2024-12-30 RX ORDER — TRAMADOL HYDROCHLORIDE 50 MG/1
50 TABLET ORAL EVERY 12 HOURS PRN
COMMUNITY

## 2024-12-30 RX ORDER — BENZONATATE 100 MG/1
100 CAPSULE ORAL 3 TIMES DAILY PRN
Status: DISCONTINUED | OUTPATIENT
Start: 2024-12-30 | End: 2024-12-31

## 2024-12-30 RX ORDER — OSELTAMIVIR PHOSPHATE 75 MG/1
75 CAPSULE ORAL 2 TIMES DAILY
Status: DISCONTINUED | OUTPATIENT
Start: 2024-12-30 | End: 2024-12-31

## 2024-12-30 RX ORDER — ACETAMINOPHEN 500 MG
500 TABLET ORAL EVERY 4 HOURS PRN
Status: DISCONTINUED | OUTPATIENT
Start: 2024-12-30 | End: 2024-12-31

## 2024-12-30 RX ORDER — ASPIRIN 81 MG/1
81 TABLET ORAL DAILY
Status: DISCONTINUED | OUTPATIENT
Start: 2024-12-30 | End: 2024-12-31

## 2024-12-30 RX ORDER — FLURAZEPAM HCL 30 MG
30 CAPSULE ORAL NIGHTLY
Status: DISCONTINUED | OUTPATIENT
Start: 2024-12-30 | End: 2024-12-30

## 2024-12-30 RX ORDER — TEMAZEPAM 7.5 MG/1
15 CAPSULE ORAL NIGHTLY PRN
Status: DISCONTINUED | OUTPATIENT
Start: 2024-12-30 | End: 2024-12-30

## 2024-12-30 RX ORDER — TRIAMTERENE AND HYDROCHLOROTHIAZIDE 37.5; 25 MG/1; MG/1
1 TABLET ORAL DAILY
Status: DISCONTINUED | OUTPATIENT
Start: 2024-12-30 | End: 2024-12-30

## 2024-12-30 RX ORDER — PROCHLORPERAZINE EDISYLATE 5 MG/ML
5 INJECTION INTRAMUSCULAR; INTRAVENOUS EVERY 8 HOURS PRN
Status: DISCONTINUED | OUTPATIENT
Start: 2024-12-30 | End: 2024-12-31

## 2024-12-30 RX ORDER — DEXTROSE MONOHYDRATE 25 G/50ML
50 INJECTION, SOLUTION INTRAVENOUS
Status: DISCONTINUED | OUTPATIENT
Start: 2024-12-30 | End: 2024-12-31

## 2024-12-30 RX ORDER — CLONAZEPAM 0.5 MG/1
1 TABLET ORAL NIGHTLY
Status: DISCONTINUED | OUTPATIENT
Start: 2024-12-30 | End: 2024-12-31

## 2024-12-30 RX ORDER — NICOTINE POLACRILEX 4 MG
30 LOZENGE BUCCAL
Status: DISCONTINUED | OUTPATIENT
Start: 2024-12-30 | End: 2024-12-31

## 2024-12-30 RX ORDER — HEPARIN SODIUM 5000 [USP'U]/ML
5000 INJECTION, SOLUTION INTRAVENOUS; SUBCUTANEOUS EVERY 8 HOURS SCHEDULED
Status: DISCONTINUED | OUTPATIENT
Start: 2024-12-30 | End: 2024-12-31

## 2024-12-30 NOTE — ED QUICK NOTES
Orders for admission, patient is aware of plan and ready to go upstairs. Any questions, please call ED RN Maria at extension [phone]    Vaccinated? N/a  Type of COVID test sent:  COVID Suspicion level:       Titratable drug(s) infusing:   Rate: n/a    LOC at time of transport: A&Ox4    Other pertinent information: flu+    CIWA score=  NIH score=

## 2024-12-30 NOTE — ED INITIAL ASSESSMENT (HPI)
C/O wheezing to rt chest since yesterday,  some sob with pain to bilateral lateral chest wall.   Also states has been coughing all night.

## 2024-12-30 NOTE — ED PROVIDER NOTES
Patient Seen in: [facility] Emergency Department      History     Chief Complaint   Patient presents with    Shortness Of Breath     Stated Complaint: shortness of breath since yesterday, wheezing    Subjective:   HPI      The patient is a 59-year-old female presented to the emergency room with reports of shortness of breath wheezing and bodyaches.  Symptoms started yesterday.  She notes that she tried an inhaler as she thought she was wheezing.  She also notes that she has had some left-sided flank pain.  She was seen for this at [place] a week ago and her workup which included a urinalysis with along with a CT kidney stone protocol was negative.    On arrival here patient is febrile to 101 tachycardic but not hypoxic.    Objective:     Past Medical History:    Abdominal distention    Abdominal pain    Abnormal uterine bleeding    Amenorrhea    Anxiety state, unspecified    Arthritis    Back pain    Belching    Bloating    Blurred vision    Chronic cough    Constipation    Diabetes mellitus (HCC)    pre diabetic    Dyspareunia    Easy bruising    H/O mammogram    benign pt stated    H/O mammogram    normal    High cholesterol    Hoarseness, chronic    Leaking of urine    Obstructive sleep apnea (adult) (pediatric)    AHI 5.3 SaO2 cole 81 % autoPAP 6-16 HME    Pain in joints    Pap smear for cervical cancer screening    wnl pt stated    Pap smear for cervical cancer screening    wnl, neg hpv    Pulmonary embolism (HCC)    Sleep disturbance    Stress    Uncomfortable fullness after meals    Wears glasses    Weight gain              Past Surgical History:   Procedure Laterality Date      ,     Cholecystectomy      Lap adjustable gastric band  2004    Had removed in     Oophorectomy      Other surgical history  2015    benign    Other surgical history Right     R hip replacement    Removal gallbladder      Tubal ligation                  Social History      Socioeconomic History    Marital status:    Tobacco Use    Smoking status: Never    Smokeless tobacco: Never   Vaping Use    Vaping status: Never Used   Substance and Sexual Activity    Alcohol use: Yes    Drug use: Never    Sexual activity: Not Currently     Partners: Male   Other Topics Concern    Caffeine Concern Yes     Comment: tea occ    Exercise Yes     Comment: trys to walk on treadmill, biking     Social Drivers of Health      [facility]    Social Connections    Received from [facility]    [facility]                  Physical Exam     ED Triage Vitals [12/30/24 0952]   /74   Pulse 120   Resp 16   Temp (!) 101.2 °F (38.4 °C)   Temp src Temporal   SpO2 100 %   O2 Device None (Room air)       Current Vitals:   Vital Signs  BP: 118/72  Pulse: 97  Resp: 18  Temp: 99 °F (37.2 °C)  Temp src: Oral    Oxygen Therapy  SpO2: 100 %  O2 Device: None (Room air)        Physical Exam  Vitals and nursing note reviewed.   Constitutional:       General: She is not in acute distress.     Appearance: Normal appearance. She is ill-appearing.   HENT:      Head: Normocephalic.      Nose: Nose normal.      Mouth/Throat:      Mouth: Mucous membranes are moist.   Eyes:      Extraocular Movements: Extraocular movements intact.      Pupils: Pupils are equal, round, and reactive to light.   Cardiovascular:      Rate and Rhythm: Normal rate and regular rhythm.      Pulses: Normal pulses.   Pulmonary:      Effort: Tachypnea present.      Breath sounds: Examination of the right-upper field reveals wheezing. Wheezing present.   Abdominal:      General: Abdomen is flat. Bowel sounds are normal. There is no distension.      Palpations: Abdomen is soft.      Tenderness: There is no abdominal tenderness. There is no right CVA tenderness, left CVA tenderness, guarding or rebound.      Hernia: No hernia is present.   Musculoskeletal:          General: No swelling or tenderness. Normal range of motion.      Cervical back: Normal range of motion.      Comments: Mild pitting bilateral edema   Skin:     General: Skin is warm and dry.   Neurological:      Mental Status: She is alert and oriented to person, place, and time. Mental status is at baseline.      Motor: Weakness: 1day.   Psychiatric:         Mood and Affect: Mood normal.             ED Course     Labs Reviewed   CBC WITH DIFFERENTIAL WITH PLATELET - Abnormal; Notable for the following components:       Result Value    RBC 3.72 (*)     HGB 11.6 (*)     HCT 34.5 (*)     Neutrophil Absolute Prelim 8.55 (*)     Neutrophil Absolute 8.55 (*)     Lymphocyte Absolute 0.96 (*)     All other components within normal limits   COMP METABOLIC PANEL (14) - Abnormal; Notable for the following components:    Glucose 111 (*)     BUN 8 (*)     AST 42 (*)     Bilirubin, Total 0.2 (*)     All other components within normal limits   LACTIC ACID, PLASMA - Abnormal; Notable for the following components:    Lactic Acid 2.6 (*)     All other components within normal limits   LACTIC ACID REFLEX POST POSTIVE - Abnormal; Notable for the following components:    Lactic Acid 2.8 (*)     All other components within normal limits   PRO BETA NATRIURETIC PEPTIDE - Abnormal; Notable for the following components:    Pro-Beta Natriuretic Peptide 251 (*)     All other components within normal limits   URINALYSIS WITH CULTURE REFLEX - Abnormal; Notable for the following components:    Urine Color Colorless (*)     Spec Gravity <1.005 (*)     All other components within normal limits   SARS-COV-2/FLU A AND B/RSV BY PCR (GENEXPERT) - Abnormal; Notable for the following components:    Influenza A by PCR Positive (*)     All other components within normal limits    Narrative:     This test is intended for the qualitative detection and differentiation of SARS-CoV-2, influenza A, influenza B, and respiratory syncytial virus (RSV) viral RNA in  nasopharyngeal or nares swabs from individuals suspected of respiratory viral infection consistent with COVID-19 by their healthcare provider. Signs and symptoms of respiratory viral infection due to SARS-CoV-2, influenza, and RSV can be similar.    Test performed using the Xpert Xpress SARS-CoV-2/FLU/RSV (real time RT-PCR)  assay on the GeneXpert instrument, [employer], [place] [address].   This test is being used under the Food and Drug Administration's Emergency Use Authorization.    The authorized Fact Sheet for Healthcare Providers for this assay is available upon request from the laboratory.   TROPONIN I HIGH SENSITIVITY - Normal   LACTIC ACID REFLEX POST POSITIVE [id number]   RAINBOW DRAW LAVENDER   RAINBOW DRAW LIGHT GREEN   RAINBOW DRAW BLUE   RAINBOW DRAW GOLD   BLOOD CULTURE   BLOOD CULTURE     EKG    Rate, intervals and axes as noted on EKG Report.  Rate: 106  Rhythm: Sinus Rhythm  Reading: Normal sinus rhythm, normal intervals, no ST elevations or depressions or T wave inversions suggestive of acute ischemia                XR CHEST AP PORTABLE  (CPT=71045)    Result Date: 12/30/2024  CONCLUSION:  Suspicion for mild pulmonary vascular congestion without andreia pulmonary edema.  Please correlate clinically.   LOCATION:  [facility]      Dictated by (CST): Katerina Costa DO on 12/30/2024 at 11:00 AM     Finalized by (CST): Katerina Cotsa DO on 12/30/2024 at 11:00 AM             [facility]          Admission disposition: 12/30/2024  1:56 PM           Medical Decision Making  The differential includes the following  Respiratory infection such as COVID-19, influenza or RSV    Pertinent comorbidities include  As listed above    Pertinent social history includes  As listed above    Labs  Lactic 2.6, repeat 2.8  White blood cell count normal  Troponin negative  BNP slightly elevated 200  Influenza A is positive    Imaging studies  I reviewed the images and my independent interpreation after review is potential evidence of some  developing edema on the right side of the chest. Additionaly, I reviewd the radiology report that states the following mild pulmonary vascular congestion without andreia pulmonary edema    External data reviewed    Discussion of management with external providers  [facility]    ER course  Patient febrile to 101 on arrival was given a gram of Tylenol and still febrile therefore given Toradol.  She has some mild pitting bilaterally and chest x-ray with some pulmonary vascular congestion and not overt edema.    Patient did not receive 30ml/kg of fluids despite having: elevated Lactate. The reason for doing so is: a concern for fluid overload. 1000mL of fluids were given instead      Patient has tested positive for influenza A.  Her repeat lactic unfortunately has increased.  Fever has finally trended downward but she still slightly tachycardic.  I discussed admission with the patient.  She has been ordered Tamiflu.  We are obtaining a urinalysis.  [facility]ist recommending CT angio to rule out PE given her prior history of a PE earlier this year.  Will additionally scan her abdomen given her continued complaints of left flank and back pain.    Disposition and Plan     Clinical Impression:  1. Influenza A    2. Sepsis due to other etiology (HCC)         Disposition:  Admit  12/30/2024  1:56 pm    Follow-up:  No follow-up provider specified.        Medications Prescribed:  Current Discharge Medication List        START taking these medications    Details   Naloxone HCl 4 MG/0.1ML Nasal Liquid 4 mg by Intranasal route as needed (May repeat as needed in other nostril if symptoms persist). If patient remains unresponsive, repeat dose in other nostril 2-5 minutes after first dose.  Qty: 1 kit, Refills: 0                 Supplementary Documentation:         Hospital Problems       Present on Admission  Date Reviewed: 11/15/2024            ICD-10-CM Noted POA    Flank pain R10.9 12/30/2024 Unknown    Influenza  J11.1 12/30/2024 Unknown

## 2024-12-30 NOTE — H&P
[facility]     Joss Carmen Patient Status:  Emergency    1965 MRN [id number]   Location [facility] EMERGENCY DEPARTMENT Attending Yokasta Han MD   Hosp Day # 0 PCP Michael Gerardo MD     Chief Complaint: fevers, SOB    Subjective:    History of Present Illness:     Joss Carmen is a 59 year old female with PMhx of DM, DL, PE presents with SOB and fevers. Pt states symptoms started over the past few days. She has had fevers, body aches and SOB. No cough. She denies any known sick contacts. She denies any CP. She does have some flank pain. She did not take her flu shot this year. In ED she was found to have Flu. Pt states she also had PE earlier this year after hip fracture. She was on blood thinners for 6 months but now has been off.     History/Other:    Past Medical History:  Past Medical History:    Abdominal distention    Abdominal pain    Abnormal uterine bleeding    Amenorrhea    Anxiety state, unspecified    Arthritis    Back pain    Belching    Bloating    Blurred vision    Chronic cough    Constipation    Diabetes mellitus (HCC)    pre diabetic    Dyspareunia    Easy bruising    H/O mammogram    benign pt stated    H/O mammogram    normal    High cholesterol    Hoarseness, chronic    Leaking of urine    Obstructive sleep apnea (adult) (pediatric)    AHI 5.3 SaO2 cole 81 % autoPAP 6-16 HME    Pain in joints    Pap smear for cervical cancer screening    wnl pt stated    Pap smear for cervical cancer screening    wnl, neg hpv    Pulmonary embolism (HCC)    Sleep disturbance    Stress    Uncomfortable fullness after meals    Wears glasses    Weight gain     Past Surgical History:   Past Surgical History:   Procedure Laterality Date      ,     Cholecystectomy      Lap adjustable gastric band  2004    Had removed in     Oophorectomy      Other surgical history  2015    benign    Other surgical history Right     R hip  replacement    Removal gallbladder      Tubal ligation  2000      Family History:   Family History   Problem Relation Age of Onset    Heart Disorder Father         CHF    Diabetes Father     Hypertension Father     Heart Attack Father     Hypertension Mother     Colon Polyps Mother     Lung Disorder Maternal Grandfather     Diabetes Brother     Cancer Maternal Aunt 40        ovarian     Social History:    reports that she has never smoked. She has never used smokeless tobacco. She reports current alcohol use. She reports that she does not use drugs.     Allergies: Allergies[1]    Medications:  Medications Ordered Prior to Encounter[2]    Review of Systems:   A comprehensive review of systems was completed.    Pertinent positives and negatives noted in the HPI.    Objective:   Physical Exam:    /72   Pulse 99   Temp 99 °F (37.2 °C) (Oral)   Resp 18   Wt 201 lb (91.2 kg)   LMP 03/28/2015 (Approximate)   SpO2 100%   BMI 40.60 kg/m²   General: No acute distress, Alert  Respiratory: decreased BS  Cardiovascular: S1, S2. Regular rate and rhythm  Abdomen: Soft, Non-tender, non-distended, positive bowel sounds  Neuro: No new focal deficits  Extremities: No edema      Results:    Labs:      Labs Last 24 Hours:    Recent Labs   Lab 12/30/24  1014   RBC 3.72*   HGB 11.6*   HCT 34.5*   MCV 92.7   MCH 31.2   MCHC 33.6   RDW 12.4   NEPRELIM 8.55*   WBC 10.1   .0       Recent Labs   Lab 12/30/24  1014   *   BUN 8*   CREATSERUM 0.71   EGFRCR 98   CA 9.8   ALB 4.4      K 3.7   CL 98   CO2 32.0   ALKPHO 71   AST 42*   ALT 39   BILT 0.2*   TP 7.3       No results found for: \"PT\", \"INR\"    Recent Labs   Lab 12/30/24  1014   TROPHS 21       Recent Labs   Lab 12/30/24  1014   PBNP 251*       No results for input(s): \"PCT\" in the last 168 hours.    Imaging: Imaging data reviewed in Epic.    Assessment & Plan:      #SOB  #Influenza  Contiunue supportive care  Tamiflu  O2 as needed  Check CTA given PE  history    #Flank Pain  Check UA and CT Ab/P  Recent CT without renal stone  Gentle IVF    #Lactic Acidosis  IVF  Repeat LA    #Ess HTN  Resume home meds            Plan of care discussed with patient, ED Physician     Leonardo Chang MD    Supplementary Documentation:     The  [employer] Cures Act makes medical notes like these available to patients in the interest of transparency. Please be advised this is a medical document. Medical documents are intended to carry relevant information, facts as evident, and the clinical opinion of the practitioner. The medical note is intended as peer to peer communication and may appear blunt or direct. It is written in medical language and may contain abbreviations or verbiage that are unfamiliar.                                       [1]   Allergies  Allergen Reactions    Morphine ANAPHYLAXIS, OTHER (SEE COMMENTS) and SHORTNESS OF BREATH     Derivatives SOB    Following a surgical procedure.   Pt. States felt SOB.  Derivatives SOB  Pt. States felt SOB.      Hydromorphone OTHER (SEE COMMENTS)     States not allergic      Metformin OTHER (SEE COMMENTS)     weakness   [2]   No current facility-administered medications on file prior to encounter.     Current Outpatient Medications on File Prior to Encounter   Medication Sig Dispense Refill    [] fluconazole 150 MG Oral Tab Take 1 tablet (150 mg total) by mouth once for 1 dose. 1 tablet 0    Triamterene (DYRENIUM OR)       traZODone 50 MG Oral Tab       topiramate 50 MG Oral Tab       topiramate 25 MG Oral Tab       temazepam 15 MG Oral Cap TK ONE C PO HS      SUMAtriptan Succinate 100 MG Oral Tab TAKE 1 TABLET BY MOUTH 2 TIMES PER DAY WITH AT LEAST 2 HOURS BETWEEN DOSES AS NEEDED      propylthiouracil 50 MG Oral Tab       predniSONE 50 MG Oral Tab Take 1 tablet (50 mg total) by mouth daily.      predniSONE 20 MG Oral Tab Take 2 tablets (40 mg total) by mouth daily.      methylPREDNISolone 4 MG Oral Tablet Therapy Pack  Take 1 tablet (4 mg total) by mouth As Directed. FOLLOW DIRECTIONS ON PACKAGING      metFORMIN  MG Oral Tablet 24 Hr       methIMAzole 5 MG Oral Tab Take 0.5 tablets (2.5 mg total) by mouth daily.      meclizine 25 MG Oral Tab       hydroCHLOROthiazide 25 MG Oral Tab  (Patient not taking: Reported on 12/20/2024)      furosemide 20 MG Oral Tab TAKE 1 TABLET BY MOUTH EVERY MORNING X 5 DAYS (Patient not taking: Reported on 12/20/2024)      flurazepam 30 MG Oral Cap TK ONE C PO Q NIGHT      fluconazole 150 MG Oral Tab TAKE 1 TABLET BY MOUTH EVERY OTHER DAY FOR 3 DOSES AS NEEDED FOR YEAST INFECTION      dicyclomine 20 MG Oral Tab Take 1 tablet (20 mg total) by mouth 3 (three) times daily as needed.      3 DAY VAGINAL 2 % Vaginal Cream APPLY 1 APPLICATORFUL VAGINALLY DAILY X 3 DAYS      cetirizine 10 MG Oral Tab Take 1 tablet (10 mg total) by mouth daily.      azithromycin 250 MG Oral Tab  (Patient not taking: Reported on 12/20/2024)      azelastine 0.1 % Nasal Solution 2 sprays by Nasal route 2 (two) times daily.      lidocaine 5 % External Patch Place 2 patches onto the skin daily. 14 each 0    amoxicillin clavulanate 500-125 MG Oral Tab  (Patient not taking: Reported on 8/25/2024)      ELIQUIS 5 MG Oral Tab Take 1 tablet (5 mg total) by mouth 2 (two) times daily. (Patient not taking: Reported on 8/25/2024)      atorvastatin 10 MG Oral Tab Take 1 tablet (10 mg total) by mouth daily. (Patient not taking: Reported on 12/20/2024)      dabigatran 150 MG Oral Cap Take 1 capsule (150 mg total) by mouth 2 (two) times daily. (Patient not taking: Reported on 12/20/2024)      docusate sodium 100 MG Oral Cap Take 1 capsule (100 mg total) by mouth 2 (two) times daily as needed. (Patient not taking: Reported on 8/25/2024)      famotidine 20 MG Oral Tab Take 1 tablet (20 mg total) by mouth 2 (two) times daily. (Patient not taking: Reported on 8/25/2024)      FEROSUL 325 (65 Fe) MG Oral Tab Take 1 tablet (325 mg total) by mouth  2 (two) times daily with meals.      fluticasone propionate 50 MCG/ACT Nasal Suspension       hyoscyamine sulfate 0.125 MG Oral Tablet Dispersible       XARELTO 20 MG Oral Tab Take 1 tablet (20 mg total) by mouth daily with food. (Patient not taking: Reported on 8/25/2024)      simvastatin 20 MG Oral Tab Take 1 tablet (20 mg total) by mouth daily. (Patient not taking: Reported on 12/20/2024)      sulfamethoxazole-trimethoprim -160 MG Oral Tab per tablet Take 1 tablet by mouth 2 (two) times daily. (Patient not taking: Reported on 8/25/2024)      mometasone 0.1 % External Ointment Apply 1 Application topically daily. 1 each 0    sucralfate 1 g Oral Tab Take 1 tablet (1 g total) by mouth 4 (four) times daily before meals and nightly. (Patient not taking: Reported on 8/25/2024) 120 tablet 0    linaCLOtide (LINZESS) 290 MCG Oral Cap Take 1 capsule (290 mcg total) by mouth daily. 90 capsule 3    Esomeprazole Magnesium 40 MG Oral Capsule Delayed Release Take 1 capsule (40 mg total) by mouth every morning before breakfast. 30 minutes before breakfast. 90 capsule 3    aspirin-acetaminophen-caffeine 250-250-65 MG Oral Tab Every 6 Hours as needed for Headache (Patient not taking: Reported on 8/25/2024)      Cholecalciferol (VITAMIN D3) 1.25 MG (50387 UT) Oral Cap Take 1 capsule by mouth.      ondansetron 4 MG Oral Tablet Dispersible PLACE 1 TABLET BY MOUTH AND LET DISSOLVE EVERY 8 HOURS AS NEEDED FOR NAUSEAS AND VOMITING      traMADol 50 MG Oral Tab Take 1 tablet (50 mg total) by mouth every 12 (twelve) hours as needed.      Meloxicam 15 MG Oral Tab Take 1 tablet (15 mg total) by mouth daily.      Calcium Ascorbate 500 MG Oral Tab Take by mouth.      Ibuprofen 200 MG Oral Cap 4 capsules (800 mg total).      aspirin 81 MG Oral Tab Take 1 tablet (81 mg total) by mouth daily.      Triamterene-HCTZ 37.5-25 MG Oral Tab Take 1 tablet by mouth as needed.      Probiotic Product (VSL#3) Oral Cap Take by mouth.      ClonazePAM 1  MG Oral Tab Take 1 tablet (1 mg total) by mouth nightly as needed for Anxiety.

## 2024-12-31 VITALS
DIASTOLIC BLOOD PRESSURE: 60 MMHG | HEART RATE: 91 BPM | OXYGEN SATURATION: 94 % | WEIGHT: 201 LBS | BODY MASS INDEX: 41 KG/M2 | TEMPERATURE: 100 F | SYSTOLIC BLOOD PRESSURE: 112 MMHG | RESPIRATION RATE: 19 BRPM

## 2024-12-31 LAB
ANION GAP SERPL CALC-SCNC: 8 MMOL/L (ref 0–18)
BASOPHILS # BLD AUTO: 0.02 X10(3) UL (ref 0–0.2)
BASOPHILS NFR BLD AUTO: 0.3 %
BUN BLD-MCNC: 7 MG/DL (ref 9–23)
CALCIUM BLD-MCNC: 8.9 MG/DL (ref 8.7–10.4)
CHLORIDE SERPL-SCNC: 99 MMOL/L (ref 98–112)
CO2 SERPL-SCNC: 29 MMOL/L (ref 21–32)
CREAT BLD-MCNC: 0.74 MG/DL
EGFRCR SERPLBLD CKD-EPI 2021: 93 ML/MIN/1.73M2 (ref 60–?)
EOSINOPHIL # BLD AUTO: 0.04 X10(3) UL (ref 0–0.7)
EOSINOPHIL NFR BLD AUTO: 0.6 %
ERYTHROCYTE [DISTWIDTH] IN BLOOD BY AUTOMATED COUNT: 13 %
GLUCOSE BLD-MCNC: 115 MG/DL (ref 70–99)
GLUCOSE BLD-MCNC: 118 MG/DL (ref 70–99)
HCT VFR BLD AUTO: 31.3 %
HGB BLD-MCNC: 10.5 G/DL
IMM GRANULOCYTES # BLD AUTO: 0.03 X10(3) UL (ref 0–1)
IMM GRANULOCYTES NFR BLD: 0.4 %
LYMPHOCYTES # BLD AUTO: 1.26 X10(3) UL (ref 1–4)
LYMPHOCYTES NFR BLD AUTO: 18.4 %
MCH RBC QN AUTO: 31.2 PG (ref 26–34)
MCHC RBC AUTO-ENTMCNC: 33.5 G/DL (ref 31–37)
MCV RBC AUTO: 92.9 FL
MONOCYTES # BLD AUTO: 0.84 X10(3) UL (ref 0.1–1)
MONOCYTES NFR BLD AUTO: 12.3 %
NEUTROPHILS # BLD AUTO: 4.66 X10 (3) UL (ref 1.5–7.7)
NEUTROPHILS # BLD AUTO: 4.66 X10(3) UL (ref 1.5–7.7)
NEUTROPHILS NFR BLD AUTO: 68 %
OSMOLALITY SERPL CALC.SUM OF ELEC: 281 MOSM/KG (ref 275–295)
PLATELET # BLD AUTO: 186 10(3)UL (ref 150–450)
POTASSIUM SERPL-SCNC: 3.9 MMOL/L (ref 3.5–5.1)
RBC # BLD AUTO: 3.37 X10(6)UL
SODIUM SERPL-SCNC: 136 MMOL/L (ref 136–145)
WBC # BLD AUTO: 6.9 X10(3) UL (ref 4–11)

## 2024-12-31 PROCEDURE — 99239 HOSP IP/OBS DSCHRG MGMT >30: CPT | Performed by: INTERNAL MEDICINE

## 2024-12-31 RX ORDER — OSELTAMIVIR PHOSPHATE 75 MG/1
75 CAPSULE ORAL 2 TIMES DAILY
Qty: 8 CAPSULE | Refills: 0 | Status: SHIPPED | OUTPATIENT
Start: 2024-12-31 | End: 2025-01-10 | Stop reason: ALTCHOICE

## 2024-12-31 RX ORDER — BENZONATATE 100 MG/1
100 CAPSULE ORAL 3 TIMES DAILY PRN
Qty: 30 CAPSULE | Refills: 0 | Status: SHIPPED | OUTPATIENT
Start: 2024-12-31

## 2024-12-31 NOTE — PROGRESS NOTES
NURSING ADMISSION NOTE      Patient admitted via Cart  Oriented to room.  Safety precautions initiated.  Bed in low position.  Call light in reach.  Patient is alert and oriented x 4. Refused Cardiac monitoring. Completed Admission navigator with patient. Started on IVF and aspirin given. Patient requesting to leave tomorrow as soon as MD discusses results from CT scan. Dr. Chang notified. Pt agreeable to the current POC.

## 2024-12-31 NOTE — PLAN OF CARE
Pt from home w/ spouse  Aox4  VSS on RA  afebrile  Refusing tele, refusing heparin  Electrolyte non-cardiac replacement  continent  QID accucheck, Pt weary about receiving Insulin  Up self, call light within reach  Carb controlled diet  Receiving IVF and Tamiflu  Pt updated on current POC, no questions at this time    Problem: Diabetes/Glucose Control  Goal: Glucose maintained within prescribed range  Description: INTERVENTIONS:  - Monitor Blood Glucose as ordered  - Assess for signs and symptoms of hyperglycemia and hypoglycemia  - Administer ordered medications to maintain glucose within target range  - Assess barriers to adequate nutritional intake and initiate nutrition consult as needed  - Instruct patient on self management of diabetes  Outcome: Progressing     Problem: Patient/Family Goals  Goal: Patient/Family Long Term Goal  Description: Patient's Long Term Goal: DC    Interventions:  - Tamiflu  - See additional Care Plan goals for specific interventions  Outcome: Progressing  Goal: Patient/Family Short Term Goal  Description: Patient's Short Term Goal: 12/30: Sleep    Interventions:   - Cough PRN  - Cluster care  - See additional Care Plan goals for specific interventions  Outcome: Progressing     Problem: RESPIRATORY - ADULT  Goal: Achieves optimal ventilation and oxygenation  Description: INTERVENTIONS:  - Assess for changes in respiratory status  - Assess for changes in mentation and behavior  - Position to facilitate oxygenation and minimize respiratory effort  - Oxygen supplementation based on oxygen saturation or ABGs  - Provide Smoking Cessation handout, if applicable  - Encourage broncho-pulmonary hygiene including cough, deep breathe, Incentive Spirometry  - Assess the need for suctioning and perform as needed  - Assess and instruct to report SOB or any respiratory difficulty  - Respiratory Therapy support as indicated  - Manage/alleviate anxiety  - Monitor for signs/symptoms of CO2  retention  Outcome: Progressing

## 2024-12-31 NOTE — DISCHARGE SUMMARY
[facility] HOSPITALIST  DISCHARGE SUMMARY     Joss Carmen Patient Status:  Observation    1965 MRN [id number]   Location [facility] 5NW-A Attending Leonardo Chang MD   Hosp Day # 0 PCP Michael Gerardo MD     Date of Admission: 2024  Date of Discharge:   2024      Discharge Disposition: Home or Self Care    Discharge Diagnosis:  Influenza  Lactic Acidosis  Ess HTN    History of Present Illness: Joss Carmen is a 59 year old female with PMhx of DM, DL, PE presents with SOB and fevers. Pt states symptoms started over the past few days. She has had fevers, body aches and SOB. No cough. She denies any known sick contacts. She denies any CP. She does have some flank pain. She did not take her flu shot this year. In ED she was found to have Flu. Pt states she also had PE earlier this year after hip fracture. She was on blood thinners for 6 months but now has been off.     Brief Synopsis: Pt was admitted and given supportive care with hydration with improvement in her lactic acid. She will finish tamiflu as outpatient.     Lace+ Score: 66  59-90 High Risk  29-58 Medium Risk  0-28   Low Risk       TCM Follow-Up Recommendation:  LACE > 58: High Risk of readmission after discharge from the hospital.      Procedures during hospitalization:   none    Incidental or significant findings and recommendations (brief descriptions):  none    Lab/Test results pending at Discharge:   none    Consultants:  none    Discharge Medication List:     Discharge Medications        START taking these medications        Instructions Prescription details   benzonatate 100 MG Caps  Commonly known as: Tessalon      Take 1 capsule (100 mg total) by mouth 3 (three) times daily as needed for cough.   Quantity: 30 capsule  Refills: 0     Naloxone HCl 4 MG/0.1ML Liqd      4 mg by Intranasal route as needed (May repeat as needed in other nostril if symptoms persist). If patient remains unresponsive, repeat dose in other  nostril 2-5 minutes after first dose.   Quantity: 1 kit  Refills: 0     oseltamivir 75 MG Caps  Commonly known as: Tamiflu      Take 1 capsule (75 mg total) by mouth 2 (two) times daily for 4 days.   Stop taking on: January 4, 2025  Quantity: 8 capsule  Refills: 0            CONTINUE taking these medications        Instructions Prescription details   aspirin 81 MG Tabs      Take 1 tablet (81 mg total) by mouth daily.   Refills: 0     aspirin-acetaminophen-caffeine 250-250-65 MG Tabs  Commonly known as: Excedrin migraine      Take 2 tablets by mouth every 6 (six) hours as needed for Pain.   Refills: 0     Calcium Ascorbate 500 MG Tabs      Take 1 tablet by mouth once daily.   Refills: 0     cetirizine 10 MG Tabs  Commonly known as: ZyrTEC      Take 1 tablet (10 mg total) by mouth daily.   Refills: 0     clonazePAM 1 MG Tabs  Commonly known as: KlonoPIN      Take 1 tablet (1 mg total) by mouth nightly as needed for Anxiety.   Refills: 0     FeroSul 325 (65 Fe) MG Tabs  Generic drug: Ferrous Sulfate      Take 1 tablet (325 mg total) by mouth 2 (two) times daily with meals.   Refills: 0     fluticasone propionate 50 MCG/ACT Susp  Commonly known as: Flonase      2 sprays by Nasal route daily.   Refills: 0     hyoscyamine sulfate 0.125 MG Tbdp  Commonly known as: LEVSIN      Take 1 tablet (0.125 mg total) by mouth every 4 (four) hours as needed.   Refills: 0     Ibuprofen 200 MG Caps      Take 2 capsules (400 mg total) by mouth 2 (two) times daily as needed (pain).   Refills: 0     SUMAtriptan Succinate 100 MG Tabs  Commonly known as: IMITREX      TAKE 1 TABLET BY MOUTH 2 TIMES PER DAY WITH AT LEAST 2 HOURS BETWEEN DOSES AS NEEDED   Refills: 0     traMADol 50 MG Tabs  Commonly known as: Ultram      Take 1 tablet (50 mg total) by mouth every 12 (twelve) hours as needed for Pain.   Refills: 0               Where to Get Your Medications        These medications were sent to [id number] - [facility] [address] [facility] [address], [phone], [phone]  [address], [facility] [address]      Hours: 24-hours Phone: [phone]   benzonatate 100 MG Caps  Naloxone HCl 4 MG/0.1ML Liqd  oseltamivir 75 MG Caps         [place]    Follow-up appointment:   No follow-up provider specified.  Appointments for Next 30 Days 2024 - 2025      None            Vital signs:  Temp:  [98.6 °F (37 °C)-101.5 °F (38.6 °C)] 100.2 °F (37.9 °C)  Pulse:  [] 105  Resp:  [16-18] 18  BP: (118-147)/(55-78) 136/69  SpO2:  [89 %-100 %] 89 %    Physical Exam:    General: No acute distress   Lungs: clear to auscultation  Cardiovascular: S1, S2  Abdomen: Soft      -----------------------------------------------------------------------------------------------  PATIENT DISCHARGE INSTRUCTIONS: See electronic chart    Leonardo Chang MD    Total time spent on discharge plannin minutes     The  [employer] Cures Act makes medical notes like these available to patients in the interest of transparency. Please be advised this is a medical document. Medical documents are intended to carry relevant information, facts as evident, and the clinical opinion of the practitioner. The medical note is intended as peer to peer communication and may appear blunt or direct. It is written in medical language and may contain abbreviations or verbiage that are unfamiliar.

## 2024-12-31 NOTE — PROGRESS NOTES
NURSING DISCHARGE NOTE    Discharged Home via Wheelchair.  Accompanied by Spouse and Support staff  Belongings Taken by patient/family.      Discharge instructions explained to patient at bedside. IV removed. Diabetes education provided. Patient's belongings taken by patient. No further questions at this time.

## 2025-01-10 ENCOUNTER — HOSPITAL ENCOUNTER (EMERGENCY)
Age: 60
Discharge: HOME | End: 2025-01-11
Attending: EMERGENCY MEDICINE
Payer: MEDICARE

## 2025-01-10 ENCOUNTER — APPOINTMENT (OUTPATIENT)
Dept: GENERAL RADIOLOGY | Age: 60
End: 2025-01-10
Payer: MEDICARE

## 2025-01-10 VITALS
HEIGHT: 59 IN | SYSTOLIC BLOOD PRESSURE: 134 MMHG | DIASTOLIC BLOOD PRESSURE: 81 MMHG | OXYGEN SATURATION: 98 % | TEMPERATURE: 99 F | RESPIRATION RATE: 18 BRPM | BODY MASS INDEX: 40.72 KG/M2 | WEIGHT: 202 LBS | HEART RATE: 87 BPM

## 2025-01-10 DIAGNOSIS — R07.9 CHEST PAIN WITH LOW RISK FOR CARDIAC ETIOLOGY: Primary | ICD-10-CM

## 2025-01-10 DIAGNOSIS — K21.9 GASTROESOPHAGEAL REFLUX DISEASE WITHOUT ESOPHAGITIS: ICD-10-CM

## 2025-01-10 LAB
ALBUMIN SERPL-MCNC: 4.7 G/DL (ref 3.2–4.8)
ALBUMIN/GLOB SERPL: 1.6 {RATIO} (ref 1–2)
ALP LIVER SERPL-CCNC: 67 U/L
ALT SERPL-CCNC: 17 U/L
ANION GAP SERPL CALC-SCNC: 5 MMOL/L (ref 0–18)
AST SERPL-CCNC: 15 U/L (ref ?–34)
BASOPHILS # BLD AUTO: 0.02 X10(3) UL (ref 0–0.2)
BASOPHILS NFR BLD AUTO: 0.2 %
BILIRUB SERPL-MCNC: 0.3 MG/DL (ref 0.3–1.2)
BUN BLD-MCNC: 15 MG/DL (ref 9–23)
CALCIUM BLD-MCNC: 9.8 MG/DL (ref 8.7–10.4)
CHLORIDE SERPL-SCNC: 102 MMOL/L (ref 98–112)
CO2 SERPL-SCNC: 31 MMOL/L (ref 21–32)
CREAT BLD-MCNC: 0.9 MG/DL
D DIMER PPP FEU-MCNC: <0.27 UG/ML FEU (ref ?–0.59)
EGFRCR SERPLBLD CKD-EPI 2021: 74 ML/MIN/1.73M2 (ref 60–?)
EOSINOPHIL # BLD AUTO: 0.08 X10(3) UL (ref 0–0.7)
EOSINOPHIL NFR BLD AUTO: 0.7 %
ERYTHROCYTE [DISTWIDTH] IN BLOOD BY AUTOMATED COUNT: 12.5 %
GLOBULIN PLAS-MCNC: 3 G/DL (ref 2–3.5)
GLUCOSE BLD-MCNC: 100 MG/DL (ref 70–99)
HCT VFR BLD AUTO: 37.1 %
HGB BLD-MCNC: 12.9 G/DL
IMM GRANULOCYTES # BLD AUTO: 0.05 X10(3) UL (ref 0–1)
IMM GRANULOCYTES NFR BLD: 0.4 %
LIPASE SERPL-CCNC: 26 U/L (ref 12–53)
LYMPHOCYTES # BLD AUTO: 3.52 X10(3) UL (ref 1–4)
LYMPHOCYTES NFR BLD AUTO: 31.3 %
MCH RBC QN AUTO: 32 PG (ref 26–34)
MCHC RBC AUTO-ENTMCNC: 34.8 G/DL (ref 31–37)
MCV RBC AUTO: 92.1 FL
MONOCYTES # BLD AUTO: 0.72 X10(3) UL (ref 0.1–1)
MONOCYTES NFR BLD AUTO: 6.4 %
NEUTROPHILS # BLD AUTO: 6.87 X10 (3) UL (ref 1.5–7.7)
NEUTROPHILS # BLD AUTO: 6.87 X10(3) UL (ref 1.5–7.7)
NEUTROPHILS NFR BLD AUTO: 61 %
OSMOLALITY SERPL CALC.SUM OF ELEC: 287 MOSM/KG (ref 275–295)
PLATELET # BLD AUTO: 349 10(3)UL (ref 150–450)
POTASSIUM SERPL-SCNC: 3.9 MMOL/L (ref 3.5–5.1)
PROT SERPL-MCNC: 7.7 G/DL (ref 5.7–8.2)
RBC # BLD AUTO: 4.03 X10(6)UL
SODIUM SERPL-SCNC: 138 MMOL/L (ref 136–145)
TROPONIN I SERPL HS-MCNC: <3 NG/L
TROPONIN I SERPL HS-MCNC: <3 NG/L
WBC # BLD AUTO: 11.3 X10(3) UL (ref 4–11)

## 2025-01-10 PROCEDURE — 84484 ASSAY OF TROPONIN QUANT: CPT | Performed by: EMERGENCY MEDICINE

## 2025-01-10 PROCEDURE — 93005 ELECTROCARDIOGRAM TRACING: CPT

## 2025-01-10 PROCEDURE — 83690 ASSAY OF LIPASE: CPT | Performed by: EMERGENCY MEDICINE

## 2025-01-10 PROCEDURE — 99285 EMERGENCY DEPT VISIT HI MDM: CPT

## 2025-01-10 PROCEDURE — 71045 X-RAY EXAM CHEST 1 VIEW: CPT

## 2025-01-10 PROCEDURE — 93010 ELECTROCARDIOGRAM REPORT: CPT

## 2025-01-10 PROCEDURE — 85025 COMPLETE CBC W/AUTO DIFF WBC: CPT | Performed by: EMERGENCY MEDICINE

## 2025-01-10 PROCEDURE — 96375 TX/PRO/DX INJ NEW DRUG ADDON: CPT

## 2025-01-10 PROCEDURE — 96374 THER/PROPH/DIAG INJ IV PUSH: CPT

## 2025-01-10 PROCEDURE — 80053 COMPREHEN METABOLIC PANEL: CPT | Performed by: EMERGENCY MEDICINE

## 2025-01-10 PROCEDURE — 85379 FIBRIN DEGRADATION QUANT: CPT | Performed by: EMERGENCY MEDICINE

## 2025-01-10 PROCEDURE — S0028 INJECTION, FAMOTIDINE, 20 MG: HCPCS | Performed by: EMERGENCY MEDICINE

## 2025-01-10 RX ORDER — OMEPRAZOLE 40 MG/1
40 CAPSULE, DELAYED RELEASE ORAL DAILY
Qty: 30 CAPSULE | Refills: 0 | Status: SHIPPED | OUTPATIENT
Start: 2025-01-10 | End: 2025-01-14

## 2025-01-10 RX ORDER — ASPIRIN 81 MG/1
243 TABLET, CHEWABLE ORAL ONCE
Status: COMPLETED | OUTPATIENT
Start: 2025-01-10 | End: 2025-01-10

## 2025-01-10 RX ORDER — ONDANSETRON 2 MG/ML
4 INJECTION INTRAMUSCULAR; INTRAVENOUS ONCE
Status: COMPLETED | OUTPATIENT
Start: 2025-01-10 | End: 2025-01-10

## 2025-01-10 RX ORDER — FAMOTIDINE 10 MG/ML
20 INJECTION, SOLUTION INTRAVENOUS ONCE
Status: COMPLETED | OUTPATIENT
Start: 2025-01-10 | End: 2025-01-10

## 2025-01-10 RX ORDER — NITROGLYCERIN 0.4 MG/1
0.4 TABLET SUBLINGUAL ONCE
Status: COMPLETED | OUTPATIENT
Start: 2025-01-10 | End: 2025-01-10

## 2025-01-11 LAB
ATRIAL RATE: 88 BPM
P AXIS: 59 DEGREES
P-R INTERVAL: 142 MS
Q-T INTERVAL: 352 MS
QRS DURATION: 72 MS
QTC CALCULATION (BEZET): 425 MS
R AXIS: 14 DEGREES
T AXIS: 36 DEGREES
VENTRICULAR RATE: 88 BPM

## 2025-01-11 NOTE — ED INITIAL ASSESSMENT (HPI)
Pt to ED with intermittent right sided CP x1 week, today pain is radiating to right arm and feeling nauseated. +SOB and feeling fatigued. Pt recently discharged from hospital due to sepsis and flu.

## 2025-01-11 NOTE — ED PROVIDER NOTES
Patient Seen in: [facility] In [place]      History     Chief Complaint   Patient presents with    Chest Pain Angina     Stated Complaint: intermittent chest pain radiating to arm x1 week, nausea    Subjective:   HPI      59-year-old female history of prediabetes, GERD on omeprazole 20 mg daily, pulmonary hypertension, high cholesterol, anxiety, PE after hip fracture on anticoagulation for 6 months now off presents to ED with intermittent chest pain for the last week, worse today at 5 PM.  She describes it as a \"burning, like a toothache \"in the right upper chest to the medial shoulder worse just laying there.  She reports that it is not worse on exertion.  She states that she was just admitted for the flu and got out of the hospital on 12/31 and she has been coughing a lot, wonders if it could hurt from coughing.  She states has been eating Jell-O and soup drinking lots of fluids and feels kind of bloated with mild upper abdominal discomfort.  She states she took 81 mg of aspirin at home.  She states she had an echo a year and a half ago that was normal.    Objective:     Past Medical History:    Abdominal distention    Abdominal pain    Abnormal uterine bleeding    Amenorrhea    Anxiety state, unspecified    Arthritis    Back pain    Belching    Bloating    Blurred vision    Chronic cough    Constipation    Diabetes mellitus (HCC)    pre diabetic    Dyspareunia    Easy bruising    H/O mammogram    benign pt stated    H/O mammogram    normal    High cholesterol    Hoarseness, chronic    Leaking of urine    Obstructive sleep apnea (adult) (pediatric)    AHI 5.3 SaO2 cole 81 % autoPAP 6-16 HME    Pain in joints    Pap smear for cervical cancer screening    wnl pt stated    Pap smear for cervical cancer screening    wnl, neg hpv    Pulmonary embolism (HCC)    Sleep disturbance    Stress    Uncomfortable fullness after meals    Wears glasses    Weight gain              Past Surgical History:    Procedure Laterality Date      ,     Cholecystectomy      Lap adjustable gastric band  2004    Had removed in     Oophorectomy  2000    Other surgical history  2015    benign    Other surgical history Right     R hip replacement    Removal gallbladder      Tubal ligation                  Social History     Socioeconomic History    Marital status:    Tobacco Use    Smoking status: Never    Smokeless tobacco: Never   Vaping Use    Vaping status: Never Used   Substance and Sexual Activity    Alcohol use: Yes    Drug use: Never    Sexual activity: Not Currently     Partners: Male   Other Topics Concern    Caffeine Concern Yes     Comment: tea occ    Exercise Yes     Comment: trys to walk on treadmill, biking     Social Drivers of Health     Food Insecurity: No Food Insecurity (2024)    Food Insecurity     Food Insecurity: Never true   Transportation Needs: No Transportation Needs (2024)    Transportation Needs     Lack of Transportation: No    [facility], [facility]    Social Connections   Housing Stability: Low Risk  (2024)    Housing Stability     Housing Instability: No                  Physical Exam     ED Triage Vitals [01/10/25 2126]   /82   Pulse 92   Resp 22   Temp 98.6 °F (37 °C)   Temp src Oral   SpO2 96 %   O2 Device None (Room air)       Current Vitals:   Vital Signs  BP: 134/81  Pulse: 87  Resp: 18  Temp: 98.6 °F (37 °C)  Temp src: Oral    Oxygen Therapy  SpO2: 98 %  O2 Device: None (Room air)        Physical Exam  Vital signs reviewed.  Nursing note reviewed.  Constitutional: Alert, no significant distress  Head: Normocephalic, atraumatic  Mouth: Moist  Eyes: Extraocular muscles intact, pupils equal  Cardiovascular: Regular rate and rhythm.  Tender to palpation right upper chest/pec into right medial shoulder  Pulmonary: Effort normal, breath sounds normal  Abdomen: Soft, nontender  nondistended  Skin: Warm and dry  Musculoskeletal range of motion grossly normal all extremities  Neuro: Alert, at baseline, no focal neuro deficit.  Moves all extremities against gravity  Psych: Mood normal          ED Course     Labs Reviewed   COMP METABOLIC PANEL (14) - Abnormal; Notable for the following components:       Result Value    Glucose 100 (*)     All other components within normal limits   CBC WITH DIFFERENTIAL WITH PLATELET - Abnormal; Notable for the following components:    WBC 11.3 (*)     All other components within normal limits   TROPONIN I HIGH SENSITIVITY - Normal   D-DIMER - Normal   LIPASE - Normal   TROPONIN I HIGH SENSITIVITY     EKG    Rate, intervals and axes as noted on EKG Report.  Rate: 88  Rhythm: Sinus Rhythm  Reading: No new ST-T wave abnormality                XR CHEST AP PORTABLE  (CPT=71045)    Result Date: 1/10/2025  PROCEDURE:  XR CHEST AP PORTABLE  (CPT=71045)  TECHNIQUE:  AP chest radiograph was obtained.  COMPARISON:  [facility] , XR, XR CHEST AP PORTABLE  (CPT=71045), 12/30/2024, 10:27 AM.  [facility], XR, XR CHEST AP PORTABLE  (CPT=71045), 8/25/2024, 7:32 PM.  INDICATIONS:  intermittent chest pain radiating to arm x1 week, nausea  PATIENT STATED HISTORY: (As transcribed by Technologist)  Pt c/o chest pain on and off for 1 week.    FINDINGS:  Cardiac size and pulmonary vasculature are within normal limits. No pleural effusions. No pneumothorax.  Minimal linear atelectasis or scarring within the right lung base.             CONCLUSION:  Minimal linear atelectasis or scarring within the right lung base   LOCATION:  [facility]      Dictated by (CST): Naseem Bush MD on 1/10/2025 at 9:47 PM     Finalized by (CST): Naseem Bush MD on 1/10/2025 at 9:47 PM       CTA CHEST + CT ABD (W) + CT PEL (W) (CPT=71275/65687)    Result Date: 12/30/2024  PROCEDURE:  CTA CHEST + CT ABD (W) + CT PEL (W) (CPT=71275/99124)  COMPARISON:  None.  INDICATIONS:  rule out PE  TECHNIQUE:  CT of the  chest (with CTA imaging), abdomen, and pelvis were obtained post- injection of non-ionic intravenous contrast material. Multi-planar reformatted/3-D images were created to optimize visualization of vascular anatomy.  Dose reduction techniques were used. Dose information is transmitted to the ACR (American College of Radiology) NRDR (National Radiology Data Registry) which includes the Dose Index Registry.  PATIENT STATED HISTORY:(As transcribed by Technologist)  SOB.  Hx of PE earlier this year.  Patient states he has flu that started yesterday.  Feeling weak and also abdominal pain.   CONTRAST USED:  100cc of Isovue 370  FINDINGS:   CHEST:  LUNGS:  3 mm left upper lobe lung nodule series 5, image 105 is noted. MEDIASTINUM:  No mass or adenopathy.  VICKY:  No mass or adenopathy.  CARDIAC:  No enlargement, pericardial thickening, or significant coronary artery calcification. PLEURA:  No mass or effusion.  CHEST WALL:  No mass or axillary adenopathy.  AORTA:  Aorta is atherosclerotic but not aneurysmal. VASCULATURE:  Opacification of pulmonary arteries is suboptimal, however, evaluation is adequate to the segmental level.  There is no pulmonary embolism detected.  ABDOMEN/PELVIS: LIVER:  No enlargement, atrophy, abnormal density, or significant focal lesion.  BILIARY:  There has been prior cholecystectomy. PANCREAS:  No lesion, fluid collection, ductal dilatation, or atrophy.  SPLEEN:  No enlargement or focal lesion.  KIDNEYS:  No mass, obstruction, or calcification.  ADRENALS:  No mass or enlargement.  AORTA:  No aneurysm or dissection.  RETROPERITONEUM:  No mass or adenopathy.  BOWEL/MESENTERY:  There is diverticulosis of the colon.  There is no CT evidence of diverticulitis. ABDOMINAL WALL:  No mass or hernia.  URINARY BLADDER:  No visible focal wall thickening, lesion, or calculus.  PELVIC NODES:  No adenopathy.  PELVIC ORGANS:  No visible mass.  Pelvic organs appropriate for patient age.  BONES:  No bony lesion or  fracture.            CONCLUSION:  1. There is no pulmonary embolism 2. There is diverticulosis of the colon without CT evidence of diverticulitis. 3. 3 mm left upper lobe lung nodule. 2017 guidelines from the Fleischner Society recommends the following: In low risk patients, no follow-up required.  In high risk patients, optional CT in 12 months.   LOCATION:  [facility]   Dictated by (CST): Hardy Gutierrez MD on 12/30/2024 at 5:13 PM     Finalized by (CST): Hardy Gutierrez MD on 12/30/2024 at 5:18 PM       XR CHEST AP PORTABLE  (CPT=71045)    Result Date: 12/30/2024  PROCEDURE:  XR CHEST AP PORTABLE  (CPT=71045)  TECHNIQUE:  AP chest radiograph was obtained.  COMPARISON:  [facility], XR, XR CHEST AP PORTABLE  (CPT=71045), 8/25/2024, 7:32 PM.  INDICATIONS:  shortness of breath since yesterday, wheezing, sat 98% upon arrival  PATIENT STATED HISTORY: (As transcribed by Technologist)  Patient states she has had SOB with wheezing since yesterday, Her symptoms started Sunday, today she is worse with bodaches and head aches with wakness.    FINDINGS:  Lungs are clear.  There is a mild degree of pulmonary vascular congestion without andreia pulmonary edema.  Please correlate clinically.  Normal cardiac silhouette size.  No pleural disease.            CONCLUSION:  Suspicion for mild pulmonary vascular congestion without andreia pulmonary edema.  Please correlate clinically.   LOCATION:  [facility]      Dictated by (CST): Katerina Costa DO on 12/30/2024 at 11:00 AM     Finalized by (CST): Katerina Costa DO on 12/30/2024 at 11:00 AM       CT ABDOMEN+PELVIS KIDNEYSTONE 2D RNDR(NO IV,NO ORAL)(CPT=74176)    Result Date: 12/20/2024  PROCEDURE:  CT ABDOMEN+PELVIS KIDNEYSTONE 2D RNDR(NO IV,NO ORAL)(CPT=74176)  COMPARISON:  [facility], CT, CT ABDOMEN+PELVIS KIDNEYSTONE 2D RNDR(NO IV,NO ORAL)(CPT=74176), 8/25/2024, 8:26 PM.  INDICATIONS:  left flank and abd pain  TECHNIQUE:  Unenhanced multislice CT scanning from above the kidneys to below the urinary  bladder.  2D rendering are generated on the CT scanner workstation to localize potential stones in the cranio-caudal plane.  Dose reduction techniques were used. Dose information is transmitted to the ACR (American College of Radiology) NRDR (National Radiology Data Registry) which includes the Dose Index Registry.  PATIENT STATED HISTORY: (As transcribed by Technologist)  Pelvic pain with urinary discomfort, and lower left back pain that radiates down into legs for one week. Patient also states she has nausea.    FINDINGS:  KIDNEYS:  No mass, obstruction, or calcification. BLADDER:  No mass, calculus or significant wall thickening. ADRENALS:  No mass or enlargement.  LIVER:  No enlargement, atrophy, abnormal density, or significant focal lesion.  BILIARY:  Sequelae of cholecystectomy. PANCREAS:  No lesion, fluid collection, ductal dilatation, or atrophy.  SPLEEN:  No enlargement or focal lesion.  AORTA/VASCULAR:  No aneurysm.  RETROPERITONEUM:  No mass or adenopathy.  BOWEL/MESENTERY:  No visible mass, obstruction, or bowel wall thickening.  Diverticulosis of the colon without evidence of acute diverticulitis.  The appendix is normal. ABDOMINAL WALL:  Eventration of the rectus sheath is noted. BONES:  Right hip arthroplasty is noted. PELVIC ORGANS:  Normal for age.  LUNG BASES:  No visible pulmonary or pleural disease.  OTHER:  Negative.             CONCLUSION:   1. No evidence of an acute inflammatory process.  No calculi in the kidneys.  No hydronephrosis.  2. The appendix is normal.  Diverticulosis of the colon without evidence of acute diverticulitis.  No evidence of an acute inflammatory process.     LOCATION:  [facility]   Dictated by (CST): Jacobo Spicer MD on 12/20/2024 at 9:38 PM     Finalized by (CST): Jacobo Spicer MD on 12/20/2024 at 9:41 PM             [facility]      Medical Decision Making:    Differential diagnosis before testing includes ACS, chest wall pain, GERD, PE potential life threatening  diagnosis which is a medical condition that poses a threat to life/function.     Comorbidities that add complexity to management: See HPI    I reviewed prior external ED notes including reviewed prior hospital discharge summary 12/30/2024 regarding influenza A, lactic acidosis which trended to normal was discharged the next day    Discussions of management with: Discussed with [facility] cardiology, reviewed case, low risk chest pain, troponin negative, D-dimer negative, EKG nonischemic, agrees patient safe to DC home, states that they will see patient in the office for close follow-up in 2 days    I personally reviewed the radiographs and my independent interpretation includes no lung consolidations, no pneumothorax.     Shared decision making:    Troponin negative, D-dimer negative, chest x-ray negative, EKG nonischemic.  Patient has a history of [place]'s esophagus, GERD she is describing burning pain into her chest and some abdominal discomfort which raises the suspicion for GI source given negative cardiac workup.  Given aspirin, nitro, Pepcid.  Patient reevaluated, feeling better now.  Lipase negative.  Discussed with patient I would increase her omeprazole to 40 mg daily, she has a follow-up with GI Dr. Holt on Tuesday.  I also spoke with cardiology as above and they will get her in on Monday.  Discussed with patient if second troponin negative, we will DC her home to follow-up with cardiology and GI close follow-up in the next few days.  Return to ED for any worsening.    Medical Decision Making      Disposition and Plan     Clinical Impression:  1. Chest pain with low risk for cardiac etiology    2. Gastroesophageal reflux disease without esophagitis         Disposition:  There is no disposition on file for this visit.  There is no disposition time on file for this visit.    Follow-up:  Michael Gerardo MD  [address] [address]  [phone]    Follow up in 2 day(s)      [facility]  [place]  [address] [facility] [address]  [phone]  Follow up in 2 day(s)      Lc Valencia MD  [address] [facility] [address] [phone]    Go in 4 day(s)      Lc Valencia MD  [address] [address], [facility] [address]  [facility] [address] [phone]                Medications Prescribed:  Current Discharge Medication List        START taking these medications    Details   Omeprazole 40 MG Oral Capsule Delayed Release Take 1 capsule (40 mg total) by mouth daily.  Qty: 30 capsule, Refills: 0                 Supplementary Documentation:

## 2025-01-14 ENCOUNTER — HOSPITAL ENCOUNTER (OUTPATIENT)
Dept: GENERAL RADIOLOGY | Age: 60
Discharge: HOME | End: 2025-01-14
Attending: FAMILY MEDICINE
Payer: MEDICARE

## 2025-01-14 DIAGNOSIS — R14.0 ABDOMINAL BLOATING: ICD-10-CM

## 2025-01-14 DIAGNOSIS — R10.12 LUQ ABDOMINAL PAIN: ICD-10-CM

## 2025-01-14 DIAGNOSIS — K59.09 CHRONIC CONSTIPATION: ICD-10-CM

## 2025-01-14 PROBLEM — K64.8 INTERNAL HEMORRHOIDS: Status: ACTIVE | Noted: 2025-01-14

## 2025-01-14 PROBLEM — K57.30 DIVERTICULA OF INTESTINE: Status: ACTIVE | Noted: 2023-12-06

## 2025-01-14 PROBLEM — E66.01 MORBID OBESITY (HCC): Status: ACTIVE | Noted: 2025-01-14

## 2025-01-14 PROBLEM — K29.70 GASTRITIS: Status: ACTIVE | Noted: 2023-12-17

## 2025-01-14 PROBLEM — E04.2 MULTINODULAR GOITER: Status: ACTIVE | Noted: 2025-01-14

## 2025-01-14 PROBLEM — N83.209 CYST OF OVARY: Status: ACTIVE | Noted: 2025-01-14

## 2025-01-14 PROBLEM — G47.20 SLEEP PATTERN DISTURBANCE: Status: ACTIVE | Noted: 2025-01-14

## 2025-01-14 PROBLEM — R76.8 ANTINUCLEAR FACTOR POSITIVE: Status: ACTIVE | Noted: 2025-01-14

## 2025-01-14 PROCEDURE — 74018 RADEX ABDOMEN 1 VIEW: CPT | Performed by: FAMILY MEDICINE

## 2025-01-15 NOTE — PROGRESS NOTES
Reviewed Xray results suggesting moderate amount of stool, called patient to discuss. States she took Linzess 290mcg last night after completing this x-ray without relief. She then took a dose of magnesium citrate and had multiple loose bowel movements with some overall improvement of her symptoms. She plans to take Linzess 290mcg again tonight and monitor her response. She will send a symptom update in 1 week or sooner if worsening symptoms. Answered her questions in full. She was appreciative of the phone call. Purvi-can you follow-up with patient in 1 week?

## 2025-01-26 ENCOUNTER — APPOINTMENT (OUTPATIENT)
Dept: GENERAL RADIOLOGY | Facility: HOSPITAL | Age: 60
End: 2025-01-26
Payer: MEDICARE

## 2025-01-26 ENCOUNTER — APPOINTMENT (OUTPATIENT)
Dept: CT IMAGING | Facility: HOSPITAL | Age: 60
End: 2025-01-26
Attending: EMERGENCY MEDICINE
Payer: MEDICARE

## 2025-01-26 ENCOUNTER — HOSPITAL ENCOUNTER (EMERGENCY)
Facility: HOSPITAL | Age: 60
Discharge: HOME | End: 2025-01-26
Attending: EMERGENCY MEDICINE
Payer: MEDICARE

## 2025-01-26 VITALS
RESPIRATION RATE: 22 BRPM | HEIGHT: 59 IN | HEART RATE: 75 BPM | TEMPERATURE: 98 F | SYSTOLIC BLOOD PRESSURE: 132 MMHG | WEIGHT: 204 LBS | OXYGEN SATURATION: 99 % | BODY MASS INDEX: 41.12 KG/M2 | DIASTOLIC BLOOD PRESSURE: 75 MMHG

## 2025-01-26 DIAGNOSIS — R07.9 CHEST PAIN OF UNCERTAIN ETIOLOGY: Primary | ICD-10-CM

## 2025-01-26 DIAGNOSIS — K21.00 GASTROESOPHAGEAL REFLUX DISEASE WITH ESOPHAGITIS WITHOUT HEMORRHAGE: ICD-10-CM

## 2025-01-26 LAB
ALBUMIN SERPL-MCNC: 4.8 G/DL (ref 3.2–4.8)
ALBUMIN/GLOB SERPL: 1.5 {RATIO} (ref 1–2)
ALP LIVER SERPL-CCNC: 68 U/L
ALT SERPL-CCNC: 14 U/L
ANION GAP SERPL CALC-SCNC: 10 MMOL/L (ref 0–18)
AST SERPL-CCNC: 17 U/L (ref ?–34)
ATRIAL RATE: 85 BPM
BASOPHILS # BLD AUTO: 0.03 X10(3) UL (ref 0–0.2)
BASOPHILS NFR BLD AUTO: 0.4 %
BILIRUB SERPL-MCNC: 0.3 MG/DL (ref 0.3–1.2)
BUN BLD-MCNC: 13 MG/DL (ref 9–23)
CALCIUM BLD-MCNC: 10 MG/DL (ref 8.7–10.6)
CHLORIDE SERPL-SCNC: 100 MMOL/L (ref 98–112)
CO2 SERPL-SCNC: 32 MMOL/L (ref 21–32)
CREAT BLD-MCNC: 0.69 MG/DL
D DIMER PPP FEU-MCNC: <0.27 UG/ML FEU (ref ?–0.59)
EGFRCR SERPLBLD CKD-EPI 2021: 100 ML/MIN/1.73M2 (ref 60–?)
EOSINOPHIL # BLD AUTO: 0.14 X10(3) UL (ref 0–0.7)
EOSINOPHIL NFR BLD AUTO: 1.6 %
ERYTHROCYTE [DISTWIDTH] IN BLOOD BY AUTOMATED COUNT: 12.2 %
GLOBULIN PLAS-MCNC: 3.1 G/DL (ref 2–3.5)
GLUCOSE BLD-MCNC: 101 MG/DL (ref 70–99)
HCT VFR BLD AUTO: 37.8 %
HGB BLD-MCNC: 13.1 G/DL
IMM GRANULOCYTES # BLD AUTO: 0.03 X10(3) UL (ref 0–1)
IMM GRANULOCYTES NFR BLD: 0.4 %
LYMPHOCYTES # BLD AUTO: 2.53 X10(3) UL (ref 1–4)
LYMPHOCYTES NFR BLD AUTO: 29.8 %
MCH RBC QN AUTO: 31.6 PG (ref 26–34)
MCHC RBC AUTO-ENTMCNC: 34.7 G/DL (ref 31–37)
MCV RBC AUTO: 91.1 FL
MONOCYTES # BLD AUTO: 0.47 X10(3) UL (ref 0.1–1)
MONOCYTES NFR BLD AUTO: 5.5 %
NEUTROPHILS # BLD AUTO: 5.29 X10 (3) UL (ref 1.5–7.7)
NEUTROPHILS # BLD AUTO: 5.29 X10(3) UL (ref 1.5–7.7)
NEUTROPHILS NFR BLD AUTO: 62.3 %
OSMOLALITY SERPL CALC.SUM OF ELEC: 294 MOSM/KG (ref 275–295)
P AXIS: 62 DEGREES
P-R INTERVAL: 152 MS
PLATELET # BLD AUTO: 257 10(3)UL (ref 150–450)
POTASSIUM SERPL-SCNC: 3.3 MMOL/L (ref 3.5–5.1)
PROT SERPL-MCNC: 7.9 G/DL (ref 5.7–8.2)
Q-T INTERVAL: 374 MS
QRS DURATION: 88 MS
QTC CALCULATION (BEZET): 445 MS
R AXIS: 19 DEGREES
RBC # BLD AUTO: 4.15 X10(6)UL
SODIUM SERPL-SCNC: 142 MMOL/L (ref 136–145)
T AXIS: 31 DEGREES
TROPONIN I SERPL HS-MCNC: <3 NG/L
VENTRICULAR RATE: 85 BPM
WBC # BLD AUTO: 8.5 X10(3) UL (ref 4–11)

## 2025-01-26 PROCEDURE — 71275 CT ANGIOGRAPHY CHEST: CPT | Performed by: EMERGENCY MEDICINE

## 2025-01-26 PROCEDURE — 74177 CT ABD & PELVIS W/CONTRAST: CPT | Performed by: EMERGENCY MEDICINE

## 2025-01-26 PROCEDURE — 84484 ASSAY OF TROPONIN QUANT: CPT | Performed by: EMERGENCY MEDICINE

## 2025-01-26 PROCEDURE — 85025 COMPLETE CBC W/AUTO DIFF WBC: CPT | Performed by: EMERGENCY MEDICINE

## 2025-01-26 PROCEDURE — 80053 COMPREHEN METABOLIC PANEL: CPT

## 2025-01-26 PROCEDURE — 36415 COLL VENOUS BLD VENIPUNCTURE: CPT

## 2025-01-26 PROCEDURE — 84484 ASSAY OF TROPONIN QUANT: CPT

## 2025-01-26 PROCEDURE — 71045 X-RAY EXAM CHEST 1 VIEW: CPT | Performed by: EMERGENCY MEDICINE

## 2025-01-26 PROCEDURE — 85379 FIBRIN DEGRADATION QUANT: CPT | Performed by: EMERGENCY MEDICINE

## 2025-01-26 PROCEDURE — 99285 EMERGENCY DEPT VISIT HI MDM: CPT

## 2025-01-26 PROCEDURE — 93005 ELECTROCARDIOGRAM TRACING: CPT

## 2025-01-26 PROCEDURE — 80053 COMPREHEN METABOLIC PANEL: CPT | Performed by: EMERGENCY MEDICINE

## 2025-01-26 PROCEDURE — 85025 COMPLETE CBC W/AUTO DIFF WBC: CPT

## 2025-01-26 PROCEDURE — 93010 ELECTROCARDIOGRAM REPORT: CPT

## 2025-01-26 RX ORDER — ESOMEPRAZOLE MAGNESIUM 40 MG/1
40 CAPSULE, DELAYED RELEASE ORAL
Qty: 30 CAPSULE | Refills: 0 | Status: SHIPPED | OUTPATIENT
Start: 2025-01-26 | End: 2025-02-25

## 2025-01-26 RX ORDER — SUCRALFATE ORAL 1 G/10ML
1 SUSPENSION ORAL
Qty: 280 ML | Refills: 0 | Status: SHIPPED | OUTPATIENT
Start: 2025-01-26 | End: 2025-02-02

## 2025-01-26 RX ORDER — POTASSIUM CHLORIDE 1.5 G/1.58G
20 POWDER, FOR SOLUTION ORAL ONCE
Status: COMPLETED | OUTPATIENT
Start: 2025-01-26 | End: 2025-01-26

## 2025-01-26 NOTE — ED PROVIDER NOTES
Patient Seen in: [facility] Emergency Department      History     Chief Complaint   Patient presents with    Chest Pain Angina     Stated Complaint: intermittent chest pain, dyspnea with exertion for past 2 weeks    Subjective:   HPI      59-year-old female presents for evaluation of chest pressure.  Patient has had constant right sided chest pressure just underneath her shoulder for the past 2 weeks.  Pain is not necessarily worse with breathing, eating or movement.  She feels slightly nauseous.  She has had a chronic cough for the past few weeks after developing influenza over the holidays.  No lower extremity pain or swelling.    Objective:     Past Medical History:    Abdominal distention    Abdominal pain    Abnormal uterine bleeding    Amenorrhea    Anxiety state, unspecified    Arthritis    Back pain    Belching    Bloating    Blurred vision    Chest pain on exertion    Chronic cough    Constipation    Diabetes mellitus (HCC)    pre diabetic    Dyspareunia    Easy bruising    Fatigue    Flatulence/gas pain/belching    Frequent urination    H/O mammogram    benign pt stated    H/O mammogram    normal    Heartburn    High cholesterol    Hoarseness, chronic    Indigestion    Irregular bowel habits    Leaking of urine    Loss of appetite    Nausea    Obstructive sleep apnea (adult) (pediatric)    AHI 5.3 SaO2 cole 81 % autoPAP 6-16 HME    Pain in joints    Pain with bowel movements    Pap smear for cervical cancer screening    wnl pt stated    Pap smear for cervical cancer screening    wnl, neg hpv    Pulmonary embolism (HCC)    Sleep disturbance    Stress    Uncomfortable fullness after meals    Wears glasses    Weight gain    Wheezing              Past Surgical History:   Procedure Laterality Date      ,     Cholecystectomy      Hip replacement surgery      Lap adjustable gastric band  2004    Had removed in     Oophorectomy      Other surgical history  2015    benign     Other surgical history Right 2023    R hip replacement    Removal gallbladder      Tubal ligation  2000                Social History     Socioeconomic History    Marital status:    Tobacco Use    Smoking status: Never    Smokeless tobacco: Never   Vaping Use    Vaping status: Never Used   Substance and Sexual Activity    Alcohol use: Not Currently    Drug use: Never    Sexual activity: Not Currently     Partners: Male   Other Topics Concern    Caffeine Concern Yes     Comment: tea occ    Exercise Yes     Comment: trys to walk on treadmill, biking     Social Drivers of Health     Food Insecurity: No Food Insecurity (12/30/2024)    Food Insecurity     Food Insecurity: Never true   Transportation Needs: No Transportation Needs (12/30/2024)    Transportation Needs     Lack of Transportation: No    [facility]    Social Connections   Housing Stability: Low Risk  (12/30/2024)    Housing Stability     Housing Instability: No                  Physical Exam     ED Triage Vitals   BP 01/26/25 1303 124/75   Pulse 01/26/25 1303 83   Resp 01/26/25 1303 18   Temp 01/26/25 1303 97.8 °F (36.6 °C)   Temp src 01/26/25 1303 Temporal   SpO2 01/26/25 1303 96 %   O2 Device 01/26/25 1315 None (Room air)       Current Vitals:   Vital Signs  BP: 132/75  Pulse: 75  Resp: 22  Temp: 97.8 °F (36.6 °C)  Temp src: Temporal  MAP (mmHg): 91    Oxygen Therapy  SpO2: 99 %  O2 Device: None (Room air)        Physical Exam     General: Alert, oriented, no apparent distress  HEENT:  Pupils equal reactive.  Extraocular motions intact. MMM.  Neck: Supple  Lungs: Clear to auscultation bilaterally.  Heart: Regular rate and rhythm.  Abdomen: Soft, nontender.   Skin: No rash.  No edema.  Neurologic: No focal neurologic deficits.  Normal speech pattern  Musculoskeletal: No tenderness or deformity noted.  Full range of motion throughout.      ED Course     Labs Reviewed   COMP METABOLIC  PANEL (14) - Abnormal; Notable for the following components:       Result Value    Glucose 101 (*)     Potassium 3.3 (*)     All other components within normal limits   TROPONIN I HIGH SENSITIVITY - Normal   D-DIMER - Normal   CBC WITH DIFFERENTIAL WITH PLATELET   RAINBOW DRAW LAVENDER   RAINBOW DRAW LIGHT GREEN   RAINBOW DRAW BLUE   RAINBOW DRAW GOLD     EKG    Rate, intervals and axes as noted on EKG Report.  Rate: 85  Rhythm: Sinus Rhythm  Reading: No ST elevation or depression                       MDM      Diagnosis includes pneumonia, PE, metabolic disturbance, GERD    I have independently visualized the radiology images, my focus/limited interpretation: No focal consolidation or pneumothorax    Defer to radiologist for other/incidental findings    Chemistry with slightly low potassium which was repleted.  CBC normal.  Troponin negative    CTA CHEST + CT ABD (W) + CT PEL (W) (CPT=71275/77784)    Result Date: 1/26/2025  PROCEDURE:  CTA CHEST + CT ABD (W) + CT PEL (W) (CPT=71275/04643)  COMPARISON:  [facility] , CT, CTA CHEST + CT ABD (W) + CT PEL (W) (CPT=71275/24776), 12/30/2024, 4:54 PM.  INDICATIONS:  intermittent chest pain, dyspnea with exertion for past 2 weeks, seen at PED for same at that time, sat 100%, HR 83 upon arrival  TECHNIQUE:  CT of the chest (with CTA imaging), abdomen, and pelvis were obtained post- injection of non-ionic intravenous contrast material. Multi-planar reformatted/3-D images were created to optimize visualization of vascular anatomy.  Dose reduction techniques were used. Dose information is transmitted to the ACR (American College of Radiology) NRDR (National Radiology Data Registry) which includes the Dose Index Registry.  PATIENT STATED HISTORY:(As transcribed by Technologist)  Chest pain/pressure radiating to right arm, shortness of breath. Patient notes a previous history of blood clots   CONTRAST USED:  100cc of Isovue 370  FINDINGS:   CHEST:  LUNGS:  Central airways appear  patent.  There is no bronchiectasis or peribronchial thickening present.  Mild basilar atelectasis versus scarring is favored.  There is no focal consolidation.  A tiny micro nodule of the left upper lobe measures 3 mm on image 105 which is stable. MEDIASTINUM:  No mass or adenopathy.  VICKY:  No mass or adenopathy.  CARDIAC:  No enlargement, pericardial thickening, or significant coronary artery calcification. PLEURA:  No mass or effusion.  CHEST WALL:  No mass or axillary adenopathy.  AORTA:  No aneurysm or dissection.  VASCULATURE:  There is no pulmonary embolism to the first subsegmental arterial level.  ABDOMEN/PELVIS: LIVER:  No enlargement, atrophy, abnormal density, or significant focal lesion.  BILIARY:  Postsurgical changes of cholecystectomy likely present. PANCREAS:  No lesion, fluid collection, ductal dilatation, or atrophy.  SPLEEN:  No enlargement or focal lesion.  KIDNEYS:  No mass, obstruction, or calcification.  ADRENALS:  No mass or enlargement.  AORTA:  No aneurysm or dissection.  RETROPERITONEUM:  No mass or adenopathy.  BOWEL/MESENTERY:  Normal-appearing appendix.  No free fluid is seen.  Lack of oral contrast somewhat limits assessment.  There is no evidence of obstruction.  No free fluid is seen. ABDOMINAL WALL:  No mass or hernia.  URINARY BLADDER:  Urinary bladder is decompressed which somewhat limits assessment. PELVIC NODES:  No adenopathy.  PELVIC ORGANS:  Essentially unremarkable appearance.  Streak artifact somewhat limits assessment.  Please note that uterus and ovaries are not well assessed with CT. BONES:  No bony lesion or fracture.            CONCLUSION:  There is no pulmonary embolism to the first subsegmental arterial level.  3 mm micro nodule of the left upper lobe is stable from the prior exam. According to Fleischner guidelines, solid nodules measuring less than 6 mm require no further followup in low risk patients. In high-risk patients with a history of smoking or other risk  factors, consider CT at 12 months. For nodules between 6-8 mm in low risk patients, followup at 6-12 months is recommended with consideration given to further followup at 18-24 months. In high-risk patients, a followup CT at 6-12 months and then 18-24 months is suggested. For nodules measuring greater than 8 mm, a followup CT at 3 months, PET/CT, or biopsy is recommended in all patients.    LOCATION:  [facility]   Dictated by (CST): Mauricio Yi MD on 1/26/2025 at 4:37 PM     Finalized by (CST): Mauricio Yi MD on 1/26/2025 at 4:42 PM          Suspect symptoms may be related to GERD.  Plan to increase omeprazole to twice daily and add Carafate for 1 week.    Medical Decision Making  Amount and/or Complexity of Data Reviewed  External Data Reviewed: notes.     Details: Reviewed discharge summary from 12/31/2024 when patient was admitted for influenza  Also recently seen by GI for GERD and told to schedule EGD        Disposition and Plan     Clinical Impression:  1. Chest pain of uncertain etiology    2. Gastroesophageal reflux disease with esophagitis without hemorrhage         Disposition:  Discharge  1/26/2025  4:50 pm    Follow-up:  Kika Rodriguez, APRN  [address] [place] [address] [phone]    Call            Medications Prescribed:  Current Discharge Medication List        START taking these medications    Details   !! Esomeprazole Magnesium 40 MG Oral Capsule Delayed Release Take 1 capsule (40 mg total) by mouth 2 (two) times daily before meals.  Qty: 30 capsule, Refills: 0      sucralfate 1 GM/10ML Oral Suspension Take 10 mL (1 g total) by mouth 4 (four) times daily before meals and nightly for 7 days.  Qty: 280 mL, Refills: 0      linaCLOtide (LINZESS) 290 MCG Oral Cap Take 1 capsule (290 mcg total) by mouth daily.  Qty: 90 capsule, Refills: 3       !! - Potential duplicate medications found. Please discuss with provider.              Supplementary Documentation:

## 2025-01-26 NOTE — ED INITIAL ASSESSMENT (HPI)
Pt to ER with complaints of chest pain, reports something is laying on my chest. Reports been going for 2x weeks. Reports radiating into r arm. Reports sob.

## 2025-01-29 ENCOUNTER — LAB ENCOUNTER (OUTPATIENT)
Dept: LAB | Age: 60
End: 2025-01-29
Attending: INTERNAL MEDICINE
Payer: MEDICARE

## 2025-01-29 DIAGNOSIS — E11.9 DIABETES MELLITUS (HCC): Primary | ICD-10-CM

## 2025-01-29 DIAGNOSIS — E78.5 HYPERLIPEMIA: ICD-10-CM

## 2025-01-29 DIAGNOSIS — E78.00 PURE HYPERCHOLESTEROLEMIA: ICD-10-CM

## 2025-01-29 LAB
CHOLEST SERPL-MCNC: 220 MG/DL (ref ?–200)
FASTING PATIENT LIPID ANSWER: YES
HDLC SERPL-MCNC: 77 MG/DL (ref 40–59)
LDLC SERPL CALC-MCNC: 128 MG/DL (ref ?–100)
NONHDLC SERPL-MCNC: 143 MG/DL (ref ?–130)
TRIGL SERPL-MCNC: 88 MG/DL (ref 30–149)
VLDLC SERPL CALC-MCNC: 16 MG/DL (ref 0–30)

## 2025-01-29 PROCEDURE — 80061 LIPID PANEL: CPT

## 2025-01-29 PROCEDURE — 36415 COLL VENOUS BLD VENIPUNCTURE: CPT

## 2025-01-31 ENCOUNTER — APPOINTMENT (OUTPATIENT)
Dept: OTOLARYNGOLOGY | Age: 60
End: 2025-01-31

## 2025-01-31 ENCOUNTER — TELEPHONE (OUTPATIENT)
Dept: OTOLARYNGOLOGY | Age: 60
End: 2025-01-31

## 2025-02-01 ENCOUNTER — HOSPITAL ENCOUNTER (EMERGENCY)
Age: 60
Discharge: HOME | End: 2025-02-01
Attending: EMERGENCY MEDICINE

## 2025-02-01 ENCOUNTER — APPOINTMENT (OUTPATIENT)
Dept: GENERAL RADIOLOGY | Age: 60
End: 2025-02-01
Attending: EMERGENCY MEDICINE

## 2025-02-01 VITALS
HEIGHT: 59 IN | BODY MASS INDEX: 43.91 KG/M2 | RESPIRATION RATE: 21 BRPM | WEIGHT: 217.81 LBS | TEMPERATURE: 97.9 F | HEART RATE: 80 BPM | OXYGEN SATURATION: 99 % | DIASTOLIC BLOOD PRESSURE: 95 MMHG | SYSTOLIC BLOOD PRESSURE: 118 MMHG

## 2025-02-01 DIAGNOSIS — K21.00 GASTROESOPHAGEAL REFLUX DISEASE WITH ESOPHAGITIS WITHOUT HEMORRHAGE: Primary | ICD-10-CM

## 2025-02-01 DIAGNOSIS — R07.89 ATYPICAL CHEST PAIN: ICD-10-CM

## 2025-02-01 LAB
ANION GAP SERPL CALC-SCNC: 8 MMOL/L (ref 7–19)
BASOPHILS # BLD: 0 K/MCL (ref 0–0.3)
BASOPHILS NFR BLD: 0 %
BUN SERPL-MCNC: 9 MG/DL (ref 6–20)
BUN/CREAT SERPL: 16 (ref 7–25)
CALCIUM SERPL-MCNC: 9.6 MG/DL (ref 8.4–10.2)
CHLORIDE SERPL-SCNC: 105 MMOL/L (ref 97–110)
CO2 SERPL-SCNC: 30 MMOL/L (ref 21–32)
CREAT SERPL-MCNC: 0.57 MG/DL (ref 0.51–0.95)
D DIMER PPP FEU-MCNC: 0.26 MG/L (FEU)
DEPRECATED RDW RBC: 42.1 FL (ref 39–50)
EGFRCR SERPLBLD CKD-EPI 2021: >90 ML/MIN/{1.73_M2}
EOSINOPHIL # BLD: 0 K/MCL (ref 0–0.5)
EOSINOPHIL NFR BLD: 1 %
ERYTHROCYTE [DISTWIDTH] IN BLOOD: 12.2 % (ref 11–15)
FASTING DURATION TIME PATIENT: ABNORMAL H
GLUCOSE SERPL-MCNC: 135 MG/DL (ref 70–99)
HCT VFR BLD CALC: 38.9 % (ref 36–46.5)
HGB BLD-MCNC: 12.9 G/DL (ref 12–15.5)
IMM GRANULOCYTES # BLD AUTO: 0 K/MCL (ref 0–0.2)
IMM GRANULOCYTES # BLD: 1 %
LYMPHOCYTES # BLD: 2.7 K/MCL (ref 1–4)
LYMPHOCYTES NFR BLD: 31 %
MAGNESIUM SERPL-MCNC: 2.3 MG/DL (ref 1.7–2.4)
MCH RBC QN AUTO: 31 PG (ref 26–34)
MCHC RBC AUTO-ENTMCNC: 33.2 G/DL (ref 32–36.5)
MCV RBC AUTO: 93.5 FL (ref 78–100)
MONOCYTES # BLD: 0.4 K/MCL (ref 0.3–0.9)
MONOCYTES NFR BLD: 5 %
NEUTROPHILS # BLD: 5.3 K/MCL (ref 1.8–7.7)
NEUTROPHILS NFR BLD: 62 %
NRBC BLD MANUAL-RTO: 0 /100 WBC
NT-PROBNP SERPL-MCNC: 50 PG/ML
PLATELET # BLD AUTO: 281 K/MCL (ref 140–450)
POTASSIUM SERPL-SCNC: 3.2 MMOL/L (ref 3.4–5.1)
RBC # BLD: 4.16 MIL/MCL (ref 4–5.2)
SODIUM SERPL-SCNC: 140 MMOL/L (ref 135–145)
TROPONIN I SERPL DL<=0.01 NG/ML-MCNC: <4 NG/L
WBC # BLD: 8.5 K/MCL (ref 4.2–11)

## 2025-02-01 PROCEDURE — 99285 EMERGENCY DEPT VISIT HI MDM: CPT

## 2025-02-01 PROCEDURE — 85379 FIBRIN DEGRADATION QUANT: CPT | Performed by: EMERGENCY MEDICINE

## 2025-02-01 PROCEDURE — 93010 ELECTROCARDIOGRAM REPORT: CPT | Performed by: INTERNAL MEDICINE

## 2025-02-01 PROCEDURE — 10002803 HB RX 637: Performed by: EMERGENCY MEDICINE

## 2025-02-01 PROCEDURE — 80048 BASIC METABOLIC PNL TOTAL CA: CPT | Performed by: EMERGENCY MEDICINE

## 2025-02-01 PROCEDURE — 83735 ASSAY OF MAGNESIUM: CPT | Performed by: EMERGENCY MEDICINE

## 2025-02-01 PROCEDURE — 93005 ELECTROCARDIOGRAM TRACING: CPT | Performed by: EMERGENCY MEDICINE

## 2025-02-01 PROCEDURE — 85025 COMPLETE CBC W/AUTO DIFF WBC: CPT | Performed by: EMERGENCY MEDICINE

## 2025-02-01 PROCEDURE — 10002801 HB RX 250 W/O HCPCS: Performed by: EMERGENCY MEDICINE

## 2025-02-01 PROCEDURE — 71046 X-RAY EXAM CHEST 2 VIEWS: CPT

## 2025-02-01 PROCEDURE — 83880 ASSAY OF NATRIURETIC PEPTIDE: CPT | Performed by: EMERGENCY MEDICINE

## 2025-02-01 PROCEDURE — 84484 ASSAY OF TROPONIN QUANT: CPT | Performed by: EMERGENCY MEDICINE

## 2025-02-01 PROCEDURE — 96374 THER/PROPH/DIAG INJ IV PUSH: CPT

## 2025-02-01 RX ORDER — SUCRALFATE 1 G/1
1 TABLET ORAL
Qty: 56 TABLET | Refills: 0 | Status: SHIPPED | OUTPATIENT
Start: 2025-02-01 | End: 2025-02-15

## 2025-02-01 RX ORDER — POTASSIUM CHLORIDE 1500 MG/1
40 TABLET, EXTENDED RELEASE ORAL ONCE
Status: COMPLETED | OUTPATIENT
Start: 2025-02-01 | End: 2025-02-01

## 2025-02-01 RX ORDER — FAMOTIDINE 10 MG/ML
40 INJECTION, SOLUTION INTRAVENOUS ONCE
Status: COMPLETED | OUTPATIENT
Start: 2025-02-01 | End: 2025-02-01

## 2025-02-01 RX ORDER — HYDROCODONE BITARTRATE AND ACETAMINOPHEN 5; 325 MG/1; MG/1
1-2 TABLET ORAL NIGHTLY PRN
Qty: 11 TABLET | Refills: 0 | Status: SHIPPED | OUTPATIENT
Start: 2025-02-01 | End: 2025-02-08

## 2025-02-01 RX ADMIN — FAMOTIDINE 40 MG: 10 INJECTION INTRAVENOUS at 19:26

## 2025-02-01 RX ADMIN — POTASSIUM CHLORIDE 40 MEQ: 1500 TABLET, EXTENDED RELEASE ORAL at 19:14

## 2025-02-01 RX ADMIN — Medication 30 ML: at 19:26

## 2025-02-01 ASSESSMENT — PAIN SCALES - GENERAL: PAINLEVEL_OUTOF10: 10

## 2025-02-01 ASSESSMENT — HEART SCORE
AGE: GREATER THAN 45 TO LESS THAN 65
TROPONIN: EQUAL OR LESS THAN NORMAL LIMIT
HEART SCORE: 1
HISTORY: SLIGHTLY SUSPICIOUS
EKG: NORMAL
RISK FACTORS: NO RISK FACTORS KNOWN

## 2025-02-01 ASSESSMENT — PAIN DESCRIPTION - PAIN TYPE: TYPE: ACUTE PAIN

## 2025-02-02 LAB
ATRIAL RATE (BPM): 85
P AXIS (DEGREES): 58
PR-INTERVAL (MSEC): 148
QRS-INTERVAL (MSEC): 76
QT-INTERVAL (MSEC): 376
QTC: 447
R AXIS (DEGREES): 18
RAINBOW EXTRA TUBES HOLD SPECIMEN: NORMAL
REPORT TEXT: NORMAL
T AXIS (DEGREES): 28
VENTRICULAR RATE EKG/MIN (BPM): 85

## 2025-03-18 PROBLEM — K21.00 GASTRO-ESOPHAGEAL REFLUX DISEASE WITH ESOPHAGITIS: Status: ACTIVE | Noted: 2025-03-18

## 2025-03-18 PROBLEM — R10.9 ABDOMINAL PAIN: Status: ACTIVE | Noted: 2025-03-18

## 2025-03-18 PROBLEM — K29.30 CHRONIC SUPERFICIAL GASTRITIS WITHOUT BLEEDING: Status: ACTIVE | Noted: 2025-03-18

## 2025-03-18 PROBLEM — R07.9 CHEST PAIN: Status: ACTIVE | Noted: 2025-03-18

## 2025-05-10 ENCOUNTER — APPOINTMENT (OUTPATIENT)
Dept: CT IMAGING | Age: 60
End: 2025-05-10
Attending: PHYSICIAN ASSISTANT
Payer: MEDICARE

## 2025-05-10 ENCOUNTER — HOSPITAL ENCOUNTER (EMERGENCY)
Age: 60
Discharge: HOME | End: 2025-05-10
Payer: MEDICARE

## 2025-05-10 VITALS
WEIGHT: 187 LBS | RESPIRATION RATE: 18 BRPM | DIASTOLIC BLOOD PRESSURE: 92 MMHG | HEIGHT: 64 IN | SYSTOLIC BLOOD PRESSURE: 138 MMHG | TEMPERATURE: 99 F | BODY MASS INDEX: 31.92 KG/M2 | HEART RATE: 72 BPM | OXYGEN SATURATION: 98 %

## 2025-05-10 DIAGNOSIS — R30.0 DYSURIA: Primary | ICD-10-CM

## 2025-05-10 DIAGNOSIS — R10.2 PELVIC PAIN: ICD-10-CM

## 2025-05-10 LAB
ALBUMIN SERPL-MCNC: 4.6 G/DL (ref 3.2–4.8)
ALBUMIN/GLOB SERPL: 1.6 {RATIO} (ref 1–2)
ALP LIVER SERPL-CCNC: 65 U/L (ref 46–118)
ALT SERPL-CCNC: 15 U/L (ref 10–49)
ANION GAP SERPL CALC-SCNC: 6 MMOL/L (ref 0–18)
AST SERPL-CCNC: 17 U/L (ref ?–34)
BASOPHILS # BLD AUTO: 0.03 X10(3) UL (ref 0–0.2)
BASOPHILS NFR BLD AUTO: 0.4 %
BILIRUB SERPL-MCNC: 0.4 MG/DL (ref 0.3–1.2)
BILIRUB UR QL STRIP.AUTO: NEGATIVE
BUN BLD-MCNC: 10 MG/DL (ref 9–23)
CALCIUM BLD-MCNC: 9.9 MG/DL (ref 8.7–10.6)
CHLORIDE SERPL-SCNC: 105 MMOL/L (ref 98–112)
CLARITY UR REFRACT.AUTO: CLEAR
CO2 SERPL-SCNC: 28 MMOL/L (ref 21–32)
COLOR UR AUTO: YELLOW
CREAT BLD-MCNC: 0.65 MG/DL (ref 0.55–1.02)
EGFRCR SERPLBLD CKD-EPI 2021: 101 ML/MIN/1.73M2 (ref 60–?)
EOSINOPHIL # BLD AUTO: 0.07 X10(3) UL (ref 0–0.7)
EOSINOPHIL NFR BLD AUTO: 0.9 %
ERYTHROCYTE [DISTWIDTH] IN BLOOD BY AUTOMATED COUNT: 12.3 %
GLOBULIN PLAS-MCNC: 2.8 G/DL (ref 2–3.5)
GLUCOSE BLD-MCNC: 100 MG/DL (ref 70–99)
GLUCOSE UR STRIP.AUTO-MCNC: NEGATIVE MG/DL
HCT VFR BLD AUTO: 38.7 % (ref 35–48)
HGB BLD-MCNC: 13 G/DL (ref 12–16)
IMM GRANULOCYTES # BLD AUTO: 0.01 X10(3) UL (ref 0–1)
IMM GRANULOCYTES NFR BLD: 0.1 %
KETONES UR STRIP.AUTO-MCNC: NEGATIVE MG/DL
LEUKOCYTE ESTERASE UR QL STRIP.AUTO: NEGATIVE
LYMPHOCYTES # BLD AUTO: 3.05 X10(3) UL (ref 1–4)
LYMPHOCYTES NFR BLD AUTO: 39.9 %
MCH RBC QN AUTO: 31.3 PG (ref 26–34)
MCHC RBC AUTO-ENTMCNC: 33.6 G/DL (ref 31–37)
MCV RBC AUTO: 93 FL (ref 80–100)
MONOCYTES # BLD AUTO: 0.47 X10(3) UL (ref 0.1–1)
MONOCYTES NFR BLD AUTO: 6.1 %
NEUTROPHILS # BLD AUTO: 4.02 X10 (3) UL (ref 1.5–7.7)
NEUTROPHILS # BLD AUTO: 4.02 X10(3) UL (ref 1.5–7.7)
NEUTROPHILS NFR BLD AUTO: 52.6 %
NITRITE UR QL STRIP.AUTO: NEGATIVE
OSMOLALITY SERPL CALC.SUM OF ELEC: 287 MOSM/KG (ref 275–295)
PH UR STRIP.AUTO: 5.5 [PH] (ref 5–8)
PLATELET # BLD AUTO: 258 10(3)UL (ref 150–450)
POTASSIUM SERPL-SCNC: 3.8 MMOL/L (ref 3.5–5.1)
PROT SERPL-MCNC: 7.4 G/DL (ref 5.7–8.2)
PROT UR STRIP.AUTO-MCNC: NEGATIVE MG/DL
RBC # BLD AUTO: 4.16 X10(6)UL (ref 3.8–5.3)
RBC UR QL AUTO: NEGATIVE
SODIUM SERPL-SCNC: 139 MMOL/L (ref 136–145)
SP GR UR STRIP.AUTO: <=1.005 (ref 1–1.03)
UROBILINOGEN UR STRIP.AUTO-MCNC: 0.2 MG/DL
WBC # BLD AUTO: 7.7 X10(3) UL (ref 4–11)

## 2025-05-10 PROCEDURE — 80053 COMPREHEN METABOLIC PANEL: CPT | Performed by: PHYSICIAN ASSISTANT

## 2025-05-10 PROCEDURE — 74177 CT ABD & PELVIS W/CONTRAST: CPT | Performed by: PHYSICIAN ASSISTANT

## 2025-05-10 PROCEDURE — 99284 EMERGENCY DEPT VISIT MOD MDM: CPT

## 2025-05-10 PROCEDURE — 81003 URINALYSIS AUTO W/O SCOPE: CPT

## 2025-05-10 PROCEDURE — 85025 COMPLETE CBC W/AUTO DIFF WBC: CPT | Performed by: PHYSICIAN ASSISTANT

## 2025-05-10 PROCEDURE — 81514 NFCT DS BV&VAGINITIS DNA ALG: CPT | Performed by: PHYSICIAN ASSISTANT

## 2025-05-10 PROCEDURE — 36415 COLL VENOUS BLD VENIPUNCTURE: CPT

## 2025-05-10 RX ORDER — PHENAZOPYRIDINE HYDROCHLORIDE 200 MG/1
200 TABLET, FILM COATED ORAL 3 TIMES DAILY PRN
Qty: 9 TABLET | Refills: 0 | Status: SHIPPED | OUTPATIENT
Start: 2025-05-10 | End: 2025-05-13

## 2025-05-10 NOTE — DISCHARGE INSTRUCTIONS
Referrals provided.  Please call to arrange follow-up.  If cultures dictate further treatment you will be contacted

## 2025-05-10 NOTE — ED INITIAL ASSESSMENT (HPI)
Pt to ED with pelvic pain and pressure x3 days, now radiating to lower back. +dysuria and chills.

## 2025-05-10 NOTE — ED PROVIDER NOTES
Patient Seen in: [facility] [place]      History     Chief Complaint   Patient presents with    Pelvic Pain     Stated Complaint: pelvic pain and pressure for 3 days    Subjective:   HPI    59-year-old female.  History of 2 previous C-sections, left oophorectomy, tubal ligation, cholecystectomy.  Patient explains that she has had some vaginal irritation and scant discharge for the past week.  She recently started exercising and thought this was secondary to this.  However, these last few days she has had urinary frequency, urgency.  In the last 24 hours she has developed fatigue and bilateral flank pain.      History of Present Illness               Objective:     Past Medical History:    Abdominal distention    Abdominal pain    Abnormal uterine bleeding    Amenorrhea    Anxiety state, unspecified    Arthritis    Back pain    Belching    Bloating    Blurred vision    Chest pain on exertion    Chronic cough    Constipation    Diabetes mellitus (HCC)    pre diabetic    Dyspareunia    Easy bruising    Fatigue    Flatulence/gas pain/belching    Frequent urination    H/O mammogram    benign pt stated    H/O mammogram    normal    Heartburn    High cholesterol    Hoarseness, chronic    Indigestion    Irregular bowel habits    Leaking of urine    Leg swelling    Loss of appetite    Nausea    Obstructive sleep apnea (adult) (pediatric)    AHI 5.3 SaO2 cole 81 % autoPAP 6-16 HME    Pain in joints    Pain with bowel movements    Pap smear for cervical cancer screening    wnl pt stated    Pap smear for cervical cancer screening    wnl, neg hpv    Pulmonary embolism (HCC)    Sleep disturbance    Stress    Uncomfortable fullness after meals    Wears glasses    Weight gain    Wheezing              Past Surgical History:   Procedure Laterality Date      ,     Cholecystectomy      Hip replacement surgery      Lap adjustable gastric band      Had removed in     Oophorectomy   2000    Other surgical history  03/2015    benign    Other surgical history Right 2023    R hip replacement    Removal gallbladder      Tubal ligation  2000                Social History     Socioeconomic History    Marital status:    Tobacco Use    Smoking status: Never    Smokeless tobacco: Never   Vaping Use    Vaping status: Never Used   Substance and Sexual Activity    Alcohol use: Not Currently    Drug use: Never    Sexual activity: Not Currently     Partners: Male   Other Topics Concern    Caffeine Concern Yes     Comment: tea occ    Exercise Yes     Comment: trys to walk on treadmill, biking     Social Drivers of Health     Food Insecurity: No Food Insecurity (12/30/2024)    Food Insecurity     Food Insecurity: Never true   Transportation Needs: No Transportation Needs (12/30/2024)    Transportation Needs     Lack of Transportation: No   Housing Stability: Low Risk  (12/30/2024)    Housing Stability     Housing Instability: No                                Physical Exam     ED Triage Vitals [05/10/25 1451]   BP (!) 135/92   Pulse 78   Resp 15   Temp 98.9 °F (37.2 °C)   Temp src Temporal   SpO2 99 %   O2 Device None (Room air)       Current Vitals:   Vital Signs  BP: (!) 138/92  Pulse: 72  Resp: 18  Temp: 98.9 °F (37.2 °C)  Temp src: Temporal    Oxygen Therapy  SpO2: 98 %  O2 Device: None (Room air)        Physical Exam     Physical Exam         Gen: Well appearing, well groomed, alert and aware x 3  Neck: Supple, full range of motion, no thyromegaly or lymphadenopathy.  Abdominal: Soft exam without distention.  No pain to palpation all 4 quadrants. No organomegaly.  Normal bowel sounds.  Back: Full active range of motion.  No CVA tenderness bilaterally.  Pelvic: no prolapse.  No friable tissue.  No cervical motion tenderness.  No discharge.  No rash or lesions.  Lung: No distress, RR, no retraction, breath sounds are clear bilaterally  Cardio: Regular rate and rhythm, normal S1-S2, no murmur  appreciable      ED Course     Labs Reviewed   COMP METABOLIC PANEL (14) - Abnormal; Notable for the following components:       Result Value    Glucose 100 (*)     All other components within normal limits   URINALYSIS WITH CULTURE REFLEX   CBC WITH DIFFERENTIAL WITH PLATELET   VAGINITIS VAGINOSIS PCR PANEL          Results         CT ABDOMEN+PELVIS(CONTRAST ONLY)(CPT=74177)  Result Date: 5/10/2025  PROCEDURE:  CT ABDOMEN+PELVIS (CONTRAST ONLY) (CPT=74177)  COMPARISON:  [facility] , CT, CTA CHEST + CT ABD (W) + CT PEL (W) SH(CPT=71275/80971), 1/26/2025, 4:02 PM.  INDICATIONS:  pelvic pain and pressure for 3 days  TECHNIQUE:  CT scanning was performed from the dome of the diaphragm to the pubic symphysis with non-ionic intravenous contrast material. Post contrast coronal MPR imaging was performed.  Dose reduction techniques were used. Dose information is transmitted to the ACR (American College of Radiology) NRDR (National Radiology Data Registry) which includes the Dose Index Registry.  PATIENT STATED HISTORY:(As transcribed by Technologist)  Patient having left sided abdomen and pelvic pain and pressure for 3 days.   CONTRAST USED:  100cc of Isovue 370  FINDINGS:  LIVER:  No enlargement, atrophy, abnormal density, or significant focal lesion.  BILIARY:  Cholecystectomy with expected prominence of the common bile duct. PANCREAS:  No lesion, fluid collection, ductal dilatation, or atrophy.  SPLEEN:  No enlargement or focal lesion.  KIDNEYS:  No mass, obstruction, or calcification.  There is a 3 mm calcification of the left hemipelvis which appears external to the left ureter (series 2, image 83).  No upstream collecting system dilation.  ADRENALS:  No mass or enlargement.  AORTA/VASCULAR:  No aneurysm or dissection.  RETROPERITONEUM:  No mass or adenopathy.  BOWEL/MESENTERY:  No visible mass, obstruction, or bowel wall thickening.  Descending/sigmoid colonic diverticulosis.  Normal appendix. ABDOMINAL WALL:  No mass or  hernia.  URINARY BLADDER:  No visible focal wall thickening, lesion, or calculus.  PELVIC NODES:  No adenopathy.  PELVIC ORGANS:  No visible mass.  Pelvic organs appropriate for patient age.  Intramural fibroid of the anterior fundus measures 1.6 x 1.6 cm. BONES:  No bony lesion or fracture.  Right total hip prosthesis noted. LUNG BASES:  No visible pulmonary or pleural disease.  OTHER:  Negative.             CONCLUSION:  No acute process identified within the abdomen or pelvis   LOCATION:  [facility]   Dictated by (CST): Gloria Mariano MD on 5/10/2025 at 6:02 PM     Finalized by (CST): Gloria Mariano MD on 5/10/2025 at 6:08 PM                      MDM      Nontoxic-appearing female.  Afebrile.  No tachycardia.    Benign abdominal exam.  No CVA tenderness    With female nursing chaperone a pelvic exam will be performed; no prolapse.  No friable tissue.  No cervical motion tenderness.  No discharge.  No rash or lesions.    Urinalysis    CBC benign    CMP benign     urinalysis is not suggestive of infection    Patient explains that she will frequently develop similar symptoms and is always told that there is no evidence of infection.  She has arranged follow-up with her OB but the appointment is not till July.    CONCLUSION:  No acute process identified within the abdomen or pelvis   LOCATION:  [facility]   Dictated by (CST): Gloria Mariano MD on 5/10/2025 at 6:02 PM     Finalized by (CST): Gloria Mariano MD on 5/10/2025 at 6:08 PM     Patient referred to urology and urogynecology.  Started on Pyridium        Medical Decision Making      Disposition and Plan     Clinical Impression:  1. Dysuria    2. Pelvic pain         Disposition:  There is no disposition on file for this visit.  There is no disposition time on file for this visit.    Follow-up:  Katharine Mckenna MD  [address]  [address]  [place] [address]  [phone]    Follow up      Quentin Mooer DO  [address]  [address]  [place]  [id number]  [phone]                Medications Prescribed:  Current Discharge Medication List        START taking these medications    Details   phenazopyridine 200 MG Oral Tab Take 1 tablet (200 mg total) by mouth 3 (three) times daily as needed for Pain.  Qty: 9 tablet, Refills: 0             Supplementary Documentation:

## 2025-05-12 ENCOUNTER — TELEPHONE (OUTPATIENT)
Dept: OBGYN CLINIC | Facility: CLINIC | Age: 60
End: 2025-05-12

## 2025-05-12 NOTE — TELEPHONE ENCOUNTER
Patient states she has been having some recent discomfort, requesting appointment with Dr. Rivera. Please advise

## 2025-05-12 NOTE — TELEPHONE ENCOUNTER
Jewel reaching out regarding groin/pelvic discomfort that moved to her lower back and bilateral flank pain. This has been going per patient for the last 1-2 wks. She states she noted some vaginal itching and burning with urination. She states pain reached a 7-8/10 and her  took her to the ER. (Notes in Epic) They did UA, vaginal cultures and CT scan. All normal. She was given phenazopyridine and recommendations to follow up with gyne.     She denies any blood either vaginal or rectal. No vaginal discharge, lumps or odor. Does have constipation, but ate oat meal and took laxative. She did pass some stool after that, and had a \"small\" amount of relief. She feels the phenazopyridine helped with the burning and itching she had. She took ES Tylenol 1000 mg x1 dose but claims it did nothing for the pain so she stopped taking it. She is asking to be seen ASAP.     Informed I would need to route message to Dr. Rivera. Pain precautions given. Encouraged ES Tylenol per the bottle instructions. She states understanding.

## 2025-05-13 NOTE — TELEPHONE ENCOUNTER
Spoke to Dr. Rivera, we addressed patient complaints and CT scan findings. She feels patient may best be seen by PCP since sounds to be possible muscle/skeletal and not gyne. UA normal, cultures negative and CT from gyne stand point does not appear to be source of discomfort. Can offer patient next opening if wishing to be seen.     She was called and informed of Dr. Rivera recommendations. She states understanding. She is asking for information on a Urogyne. She states ER recommended she see one as well since it could be a bladder suspension issue. Patient does deny bulging in the vaginal area. Dr. Moore information given.

## 2025-05-14 ENCOUNTER — OFFICE VISIT (OUTPATIENT)
Dept: UROLOGY | Facility: HOSPITAL | Age: 60
End: 2025-05-14
Attending: OBSTETRICS & GYNECOLOGY
Payer: MEDICARE

## 2025-05-14 VITALS — HEIGHT: 59 IN | BODY MASS INDEX: 37.7 KG/M2 | WEIGHT: 187 LBS | RESPIRATION RATE: 18 BRPM

## 2025-05-14 DIAGNOSIS — N81.84 PELVIC MUSCLE WASTING: Primary | ICD-10-CM

## 2025-05-14 DIAGNOSIS — N39.3 FEMALE STRESS INCONTINENCE: ICD-10-CM

## 2025-05-14 DIAGNOSIS — R10.2 PELVIC PAIN: ICD-10-CM

## 2025-05-14 DIAGNOSIS — R35.1 NOCTURIA: ICD-10-CM

## 2025-05-14 DIAGNOSIS — N95.2 POSTMENOPAUSAL ATROPHIC VAGINITIS: ICD-10-CM

## 2025-05-14 LAB
BLOOD URINE: NEGATIVE
CONTROL RUN WITHIN 24 HOURS?: YES
LEUKOCYTE ESTERASE URINE: NEGATIVE
NITRITE URINE: NEGATIVE

## 2025-05-14 PROCEDURE — 87086 URINE CULTURE/COLONY COUNT: CPT | Performed by: OBSTETRICS & GYNECOLOGY

## 2025-05-14 PROCEDURE — 81002 URINALYSIS NONAUTO W/O SCOPE: CPT | Performed by: OBSTETRICS & GYNECOLOGY

## 2025-05-14 PROCEDURE — 99202 OFFICE O/P NEW SF 15 MIN: CPT

## 2025-05-14 RX ORDER — ESTRADIOL 0.1 MG/G
CREAM VAGINAL
Qty: 42 G | Refills: 3 | Status: SHIPPED | OUTPATIENT
Start: 2025-05-14

## 2025-05-14 NOTE — PROGRESS NOTES
Kimberly Moore,   2025     Referred by Dr. Rivera  Pt here with self    Chief Complaint   Patient presents with    Pelvic Pain     Intermittent Pelvic pain X 2 weeks. Most bothersome.  BHARATI x 5 years not bothersome  Feels irritation, stinging, burning inside vagina x 2 weeks.      5/10/25 ua neg  5/10/25 vag panel neg  5/10/25 CT scan no acute process    HPI:  +BHARATI, minimal bother  No UUI  Nocturia x2  No prolapse sx  Not currently sexually active, n/a dyspareunia  Constipated bowels    PRIOR TREATMENTS:    Kegels    No UTIs  No gross hematuria    , c/sx2    PMH: KYLE, DM, GERD    Vitals:  Resp 18   Ht 59\"   Wt 187 lb (84.8 kg)   LMP 2015 (Approximate)   BMI 37.77 kg/m²      HISTORY:  Past Medical History[1]   Past Surgical History[2]   Family History[3]   Short Social Hx on File[4]     Allergies:  Allergies[5]    Medications:  Medications Prior to Visit[6]    Urogynecology Summary:  Urogynecology Summary  Prolapse: No  BHARATI: Yes (Reports sometimes)  Urge Incontinence: No  Nocturia Frequency: 2  Frequency: 2 - 3 hours  Incomplete emptying: No  Constipation: Yes (Bowel regimen reviewed. handut given)  Wears pad day?: 1  Wears Pad Night?: 1  Activities are limited by UI/POP?: No  Currently Sexually Active: No  Avoids sexual activity due to:  (Denies dysparunia, leakage or bulge)    Review of Systems:    A comprehensive 12 point review of systems was completed.  Pertinent positives noted in the the HPI.  No CP  No SOB    GENERAL EXAM:  GENERAL:  Alert and Oriented, and NAD  HEENT:  Normal, no lesions  LUNGS:  Normal effort  HEART:  RRR  ABDOMEN: soft, no mass, midline scar, obese  EXTREM:  Normal, no edema  SKIN:  Normal, no lesions    PELVIC EXAM:  Ext. Gen: some atrophy, no lesions  Urethra: some atrophy, nontender  Bladder:some fullness, nontender  Vagina: some atrophy  Cervix: no bleeding, no lesions, nontender  Uterus: +mobile  Adnexa:no masses, nontender  Perineum: nontender  Anus: wnl  Rectum:  defer    PELVIS FLOOR NEUROMUSCULAR FUNCTION:  Strength:  1, Unable to hold greater than 3 sec, Increased tone, and Recruitment  Perineal Sensation:  Normal      PELVIC SUPPORT:  Britt:  0  Ant:  0  Post:  0  CST:  negative  UVJ: somewhat hypermobile    Pt examined with chaperone, RN    Impression/Plan:    ICD-10-CM    1. Pelvic muscle wasting  N81.84       2. Postmenopausal atrophic vaginitis  N95.2 estradiol (ESTRACE) 0.1 MG/GM Vaginal Cream      3. Nocturia  R35.1       4. Female stress incontinence  N39.3       5. Pelvic pain  R10.2 PHYSICAL THERAPY - External Location          Discussion Items:   Urodynamics and cystoscopy for evaluation of LUTS  Nonsurgical and surgical treatments for Stress Urinary Incontinence  Pelvic muscle rehabilitation including pelvic floor PT  Topical estrogen therapy for treating UGA  Bowel routine/constipation regimen  Discussed dietary and behavioral modification, discussed pharmacologic and nonpharmacologic mgmt options for urinary symptoms. Discussed dietary & weight management with potential improvements in symptoms with weight loss.    Discussed mgmt of vulvovaginal atrophy with vaginal estrogen cream. Reviewed associated benefits, risks, alternatives, and goals. Recommend low dose twice weekly mgmt     Bowel management reviewed. Discussed using fiber daily w/ addition of miralax as needed    Discussed pelvic pain and mgmt with PFPT    Diagnostic Items:  Urine testing    Medications Discussed:  Estrace Cream  Fiber  Miralax    Treatment Plan, Non-surgical:   RN teaching/pt education done  Fiber supplement  Stimulant:  MOM, Miralax  Estrace / Premarin cream    Treatment Plan, Surgical:   None    Pt verbalizes understanding of all above discussed information. All questions answered. She agrees to plan    Return in about 3 months (around 8/14/2025) for PT f/u.    Kimberly Moore DO, FACOG, FACS      Discussion undertaken in English, info provided      The 21st Century Cures Act  makes medical notes like these available to patients in the interest of transparency. However, be advised this is a medical document. It is intended as peer to peer communication. It is written in medical language and may contain abbreviations or verbiage that are unfamiliar. It may appear blunt or direct. Medical documents are intended to carry relevant information, facts as evident, and the clinical opinion of the practitioner.          [1]   Past Medical History:   Abdominal distention    Abdominal pain    Abnormal uterine bleeding    Amenorrhea    Anxiety state, unspecified    Arthritis    Back pain    Belching    Bloating    Blurred vision    Chest pain on exertion    Chronic cough    Constipation    Diabetes mellitus (HCC)    pre diabetic    Dyspareunia    Easy bruising    Fatigue    Flatulence/gas pain/belching    Frequent urination    H/O mammogram    benign pt stated    H/O mammogram    normal    Heartburn    High cholesterol    Hoarseness, chronic    Indigestion    Irregular bowel habits    Leaking of urine    Leg swelling    Loss of appetite    Nausea    Obstructive sleep apnea (adult) (pediatric)    AHI 5.3 SaO2 cole 81 % autoPAP 6-16 HME    Pain in joints    Pain with bowel movements    Pap smear for cervical cancer screening    wnl pt stated    Pap smear for cervical cancer screening    wnl, neg hpv    Pulmonary embolism (HCC)    Sleep disturbance    Stress    Uncomfortable fullness after meals    Wears glasses    Weight gain    Wheezing   [2]   Past Surgical History:  Procedure Laterality Date      ,     Cholecystectomy      Hip replacement surgery      Lap adjustable gastric band      Had removed in     Oophorectomy      Other surgical history  2015    benign    Other surgical history Right     R hip replacement    Removal gallbladder      Tubal ligation     [3]   Family History  Problem Relation Age of Onset    Stroke Father     Heart Disorder Father          CHF    Diabetes Father     Hypertension Father     Heart Attack Father     Hypertension Mother     Colon Polyps Mother     Lung Disorder Maternal Grandfather     Diabetes Brother     Cancer Maternal Aunt 40        ovarian   [4]   Social History  Socioeconomic History    Marital status:    Tobacco Use    Smoking status: Never    Smokeless tobacco: Never   Vaping Use    Vaping status: Never Used   Substance and Sexual Activity    Alcohol use: Not Currently    Drug use: Never    Sexual activity: Not Currently     Partners: Male   Other Topics Concern    Caffeine Concern Yes     Comment: tea occ    Exercise Yes     Comment: trys to walk on treadmill, biking     Social Drivers of Health     Food Insecurity: No Food Insecurity (12/30/2024)    Food Insecurity     Food Insecurity: Never true   Transportation Needs: No Transportation Needs (12/30/2024)    Transportation Needs     Lack of Transportation: No   Housing Stability: Low Risk  (12/30/2024)    Housing Stability     Housing Instability: No   [5]   Allergies  Allergen Reactions    Morphine ANAPHYLAXIS, OTHER (SEE COMMENTS) and SHORTNESS OF BREATH     Derivatives SOB    Following a surgical procedure.   Pt. States felt SOB.  Derivatives SOB  Pt. States felt SOB.      Metformin OTHER (SEE COMMENTS)     weakness   [6]   Outpatient Medications Prior to Visit   Medication Sig Dispense Refill    Cholecalciferol (VITAMIN D3 OR) Take by mouth.      Ascorbic Acid (VITAMIN C OR) Take by mouth.      Cyanocobalamin (B-12 OR) Take by mouth.      BIOTIN OR Take by mouth.      traMADol 50 MG Oral Tab Take 1 tablet (50 mg total) by mouth every 12 (twelve) hours as needed for Pain.      SUMAtriptan Succinate 100 MG Oral Tab       cetirizine 10 MG Oral Tab Take 1 tablet (10 mg total) by mouth in the morning.      hyoscyamine sulfate 0.125 MG Oral Tablet Dispersible Take 1 tablet (0.125 mg total) by mouth every 4 (four) hours as needed.      aspirin-acetaminophen-caffeine  250-250-65 MG Oral Tab Take 2 tablets by mouth every 6 (six) hours as needed for Pain.      ClonazePAM 1 MG Oral Tab Take 1 tablet (1 mg total) by mouth nightly as needed for Anxiety.      ZINC OR Use as directed in the mouth or throat. (Patient not taking: Reported on 5/14/2025)      linaCLOtide (LINZESS) 290 MCG Oral Cap Take 1 capsule (290 mcg total) by mouth daily. (Patient not taking: Reported on 5/14/2025) 90 capsule 3    Esomeprazole Magnesium 40 MG Oral Capsule Delayed Release  (Patient not taking: Reported on 5/14/2025)      Meloxicam 15 MG Oral Tab  (Patient not taking: Reported on 5/14/2025)      ondansetron 8 MG Oral Tablet Dispersible  (Patient not taking: Reported on 5/14/2025)      famotidine 40 MG Oral Tab Take 1 tablet (40 mg total) by mouth daily. (Patient not taking: Reported on 5/14/2025) 90 tablet 1    benzonatate 100 MG Oral Cap Take 1 capsule (100 mg total) by mouth 3 (three) times daily as needed for cough. (Patient not taking: Reported on 5/14/2025) 30 capsule 0    Calcium Ascorbate 500 MG Oral Tab Take 1 tablet by mouth once daily. (Patient not taking: Reported on 5/14/2025)      aspirin 81 MG Oral Tab Take 1 tablet (81 mg total) by mouth daily. (Patient not taking: Reported on 5/14/2025)       No facility-administered medications prior to visit.

## 2025-06-02 ENCOUNTER — OFFICE VISIT (OUTPATIENT)
Dept: ENDOCRINOLOGY CLINIC | Facility: CLINIC | Age: 60
End: 2025-06-02

## 2025-06-02 VITALS
HEART RATE: 70 BPM | BODY MASS INDEX: 38.71 KG/M2 | HEIGHT: 59 IN | SYSTOLIC BLOOD PRESSURE: 135 MMHG | DIASTOLIC BLOOD PRESSURE: 79 MMHG | WEIGHT: 192 LBS

## 2025-06-02 DIAGNOSIS — E78.5 DYSLIPIDEMIA: ICD-10-CM

## 2025-06-02 DIAGNOSIS — E04.1 THYROID NODULE: Primary | ICD-10-CM

## 2025-06-02 DIAGNOSIS — E11.69 TYPE 2 DIABETES MELLITUS WITH OTHER SPECIFIED COMPLICATION, WITHOUT LONG-TERM CURRENT USE OF INSULIN (HCC): ICD-10-CM

## 2025-06-02 DIAGNOSIS — L65.9 HAIR LOSS: ICD-10-CM

## 2025-06-02 DIAGNOSIS — E53.9 VITAMIN B DEFICIENCY: ICD-10-CM

## 2025-06-02 DIAGNOSIS — R53.83 OTHER FATIGUE: ICD-10-CM

## 2025-06-02 PROCEDURE — 99204 OFFICE O/P NEW MOD 45 MIN: CPT | Performed by: INTERNAL MEDICINE

## 2025-06-02 NOTE — H&P
New Patient Evaluation - History and Physical    CONSULT - Reason for Visit:  Thyroid nodule, Fatigue, Type 2 DM, Dyslipidemia  Requesting Physician: Self referral     CHIEF COMPLAINT:    Chief Complaint   Patient presents with    Consult     Pt is in to establish care with Endocrinologist on Thyroid        HISTORY OF PRESENT ILLNESS:   Joss Carmen is a 59 year old female who presents for evaluation of thyroid nodule    Last US July 2025    There is an enlarging TR 3 nodule in the mid upper pole the right lobe of the thyroid measuring 2.1 x 1 x 1.6 cm, previously 1.4 x 1.4 x 1.2 cm.  There is a stable lower pole TR 4   nodule measuring 1.4 x 0.8 cm.  There is a new TR 3 nodule in the midpole measuring 1.5 x 0.9 cm.   FNA  Fine-needle aspiration of right inferior thyroid nodule:  -Adequate for evaluation.  -Cytologic features consistent with benign follicular nodule with degenerative changes ([place] 2).  Personal or Family history of thyroid cancer: denies   Personal or family history of MEN: denies   Exposure to head and neck radiation before age 20: denies   Compressive symptoms (difficulty in breathing or swallowing): denies       She reports the following symptoms  Weight changes: she has been trying to loose weight by making lifestyle changes   Fatigue  Hair loss from scalp      PAST MEDICAL HISTORY:   Past Medical History[1]    PAST SURGICAL HISTORY:   Past Surgical History[2]    CURRENT MEDICATIONS:    Current Medications[3]    ALLERGIES:  Allergies[4]    SOCIAL HISTORY:    Social Hx on file[5]    FAMILY HISTORY:   Family History[6]    ASSESSMENTS:     REVIEW OF SYSTEMS:  Constitutional: Negative for:  fever,+  fatigue, cold/heat intolerance, + intentional weight changes   Eyes: Negative for:  Visual changes, proptosis, blurring  ENT: Negative for:  dysphagia, neck swelling, dysphonia  Respiratory: Negative for:  dyspnea, cough  Cardiovascular: Negative for:  chest pain, palpitations, orthopnea  GI:  Negative for:  abdominal pain, nausea, vomiting, diarrhea, constipation, bleeding  Neurology: Negative for: headache, numbness, weakness  Genito-Urinary: Negative for: dysuria, frequency  Psychiatric: Negative for:  depression, anxiety  Hematology/Lymphatics: Negative for: bruising, lower extremity edema  Endocrine: Negative for: polyuria, polydypsia  Skin: Negative for: rash, blister, cellulitis,      PHYSICAL EXAM:   Vitals:    06/02/25 1351   BP: 135/79   Pulse: 70   Weight: 192 lb (87.1 kg)   Height: 4' 11\" (1.499 m)     BMI: Body mass index is 38.78 kg/m².         General Appearance:  alert, well developed, in no acute distress  Head: Atraumatic  Eyes:  normal conjunctivae, sclera., normal sclera and normal pupils  Throat/Neck: normal sound to voice. Normal hearing, normal speech, no significant thyroid enlargement palpated   Respiratory:  Speaking in full sentences, non-labored. no increased work of breathing, no audible wheezing    Neuro: motor grossly intact, moving all extremities without difficulty  Psychiatric:  oriented to time, self, and place  Extremities:          DATA:     Pertinent data reviewed      ASSESSMENT AND PLAN:    Patient is a 59 year old female with:      thyroid nodule  -Discussed common occurrence of thyroid nodules in the population approx 50-60% of the population  -Discussed risk of malignancy is approx 3-5%, 95-97% of nodules are benign  -Discussed recommendation to perform FNA biopsy of nodules greater than 1cm in size  -Discussed that if nodule is benign then plan to follow with yearly thyroid ultrasound    No compressive symptoms  Will repeat US      2. Hair loss from scalp  Hb , TSH normal .   TT, PRL, DHEAS ordered   She should c/w a healthy lifestyle.   C/w MV  Discussed that unless there is a deficiency, biotin has not been shown to help with this in clinical trials.   Also suggest FU with a dermatologist.     3. Fatigue    - She is not on any medication classes that are  commonly associated with fatigue ( these include sedative-hypnotics, antidepressants, muscle relaxants, opioids, antihypertensives, antihistamines, and many types of antibiotics )    - No mood changes    - Laboratory studies should be considered, although their results affect management in a small percentage of patients.   Thyroid, vitamin D cbc CMP normal   Will check Vitamin B 12    Continue to work on a healthy lifestyle changes     4.  She will also like to review Type 2 DM  New diagnosis 6.5 % ( 12/2025)   She has not been on medication but has been working on lifestyle changes. In the past has t/f MTF ozempic and mounjaro due to SE  Will recheck A1c  C/w healthy lifestyle   Dyslipidemia: on cholesterol lowering medication, has also been working on a low fat diet , fasting lipid panel   Ur MA ordered  Daily feet exam  Eye exam: will schedule   Information on low carb provided               Patient verbalized a complete  understanding of all of the above and did not have any further questions.       Orders Placed This Encounter   Procedures    Testosterone Total    Dehydroepiandrosterone Sulfate    Prolactin    Vitamin B12    Hemoglobin A1C [E]    Lipid Panel [E]    Microalb/Creat Ratio, Random Urine [E]         6/2/2025  Viviana Lyn MD        Patient verbalized a complete  understanding of all of the above and did not have any further questions.          [1]   Past Medical History:   Abdominal distention    Abdominal pain    Abnormal uterine bleeding    Amenorrhea    Anxiety state, unspecified    Arthritis    Back pain    Belching    Bloating    Blurred vision    Chest pain on exertion    Chronic cough    Constipation    Diabetes mellitus (HCC)    pre diabetic    Dyspareunia    Easy bruising    Fatigue    Flatulence/gas pain/belching    Frequent urination    H/O mammogram    benign pt stated    H/O mammogram    normal    Heartburn    High cholesterol    Hoarseness, chronic    Indigestion    Irregular bowel  habits    Leaking of urine    Leg swelling    Loss of appetite    Nausea    Obstructive sleep apnea (adult) (pediatric)    AHI 5.3 SaO2 cole 81 % autoPAP 6-16 HME    Pain in joints    Pain with bowel movements    Pap smear for cervical cancer screening    wnl pt stated    Pap smear for cervical cancer screening    wnl, neg hpv    Pulmonary embolism (HCC)    Sleep disturbance    Stress    Uncomfortable fullness after meals    Wears glasses    Weight gain    Wheezing   [2]   Past Surgical History:  Procedure Laterality Date      ,     Cholecystectomy      Hip replacement surgery      Lap adjustable gastric band      Had removed in     Oophorectomy      Other surgical history  2015    benign    Other surgical history Right     R hip replacement    Removal gallbladder      Tubal ligation     [3]   Current Outpatient Medications   Medication Sig Dispense Refill    Cholecalciferol (VITAMIN D3 OR) Take by mouth.      Ascorbic Acid (VITAMIN C OR) Take by mouth.      Cyanocobalamin (B-12 OR) Take by mouth.      BIOTIN OR Take by mouth.      traMADol 50 MG Oral Tab Take 1 tablet (50 mg total) by mouth every 12 (twelve) hours as needed for Pain.      SUMAtriptan Succinate 100 MG Oral Tab       cetirizine 10 MG Oral Tab Take 1 tablet (10 mg total) by mouth in the morning.      aspirin-acetaminophen-caffeine 250-250-65 MG Oral Tab Take 2 tablets by mouth every 6 (six) hours as needed for Pain.      ClonazePAM 1 MG Oral Tab Take 1 tablet (1 mg total) by mouth nightly as needed for Anxiety.      estradiol (ESTRACE) 0.1 MG/GM Vaginal Cream Apply 1/2 gram vaginally 2 times per week. 42 g 3    ZINC OR Use as directed in the mouth or throat. (Patient not taking: Reported on 2025)      linaCLOtide (LINZESS) 290 MCG Oral Cap Take 1 capsule (290 mcg total) by mouth daily. (Patient not taking: Reported on 2025) 90 capsule 3    Esomeprazole Magnesium 40 MG Oral Capsule Delayed Release   (Patient not taking: Reported on 5/14/2025)      Meloxicam 15 MG Oral Tab  (Patient not taking: Reported on 5/14/2025)      ondansetron 8 MG Oral Tablet Dispersible  (Patient not taking: Reported on 5/14/2025)      famotidine 40 MG Oral Tab Take 1 tablet (40 mg total) by mouth daily. (Patient not taking: Reported on 5/14/2025) 90 tablet 1    benzonatate 100 MG Oral Cap Take 1 capsule (100 mg total) by mouth 3 (three) times daily as needed for cough. (Patient not taking: Reported on 5/14/2025) 30 capsule 0    hyoscyamine sulfate 0.125 MG Oral Tablet Dispersible Take 1 tablet (0.125 mg total) by mouth every 4 (four) hours as needed.      Calcium Ascorbate 500 MG Oral Tab Take 1 tablet by mouth once daily. (Patient not taking: Reported on 5/14/2025)      aspirin 81 MG Oral Tab Take 1 tablet (81 mg total) by mouth daily. (Patient not taking: Reported on 5/14/2025)     [4]   Allergies  Allergen Reactions    Morphine ANAPHYLAXIS, OTHER (SEE COMMENTS) and SHORTNESS OF BREATH     Derivatives SOB    Following a surgical procedure.   Pt. States felt SOB.  Derivatives SOB  Pt. States felt SOB.      Metformin OTHER (SEE COMMENTS)     weakness   [5]   Social History  Socioeconomic History    Marital status:    Tobacco Use    Smoking status: Never    Smokeless tobacco: Never   Vaping Use    Vaping status: Never Used   Substance and Sexual Activity    Alcohol use: Not Currently    Drug use: Never    Sexual activity: Not Currently     Partners: Male   Other Topics Concern    Caffeine Concern Yes     Comment: tea occ    Exercise Yes     Comment: trys to walk on treadmill, biking   [6]   Family History  Problem Relation Age of Onset    Stroke Father     Heart Disorder Father         CHF    Diabetes Father     Hypertension Father     Heart Attack Father     Hypertension Mother     Colon Polyps Mother     Lung Disorder Maternal Grandfather     Diabetes Brother     Cancer Maternal Aunt 40        ovarian

## 2025-06-09 ENCOUNTER — TELEPHONE (OUTPATIENT)
Dept: OTOLARYNGOLOGY | Age: 60
End: 2025-06-09

## 2025-06-10 ENCOUNTER — OFFICE VISIT (OUTPATIENT)
Dept: OTOLARYNGOLOGY | Age: 60
End: 2025-06-10

## 2025-06-10 VITALS — BODY MASS INDEX: 38.1 KG/M2 | HEIGHT: 59 IN | WEIGHT: 189 LBS

## 2025-06-10 DIAGNOSIS — H92.02 REFERRED OTALGIA OF LEFT EAR: Primary | ICD-10-CM

## 2025-06-10 DIAGNOSIS — M26.609 TMJ (TEMPOROMANDIBULAR JOINT DISORDER): ICD-10-CM

## 2025-06-10 DIAGNOSIS — L29.9 EAR ITCHING: ICD-10-CM

## 2025-06-10 PROCEDURE — 99214 OFFICE O/P EST MOD 30 MIN: CPT | Performed by: PHYSICIAN ASSISTANT

## 2025-06-10 RX ORDER — ERGOCALCIFEROL 1.25 MG/1
CAPSULE ORAL
COMMUNITY

## 2025-06-10 RX ORDER — MELOXICAM 15 MG/1
15 TABLET ORAL DAILY
COMMUNITY

## 2025-06-13 ENCOUNTER — OFFICE VISIT (OUTPATIENT)
Dept: AUDIOLOGY | Facility: CLINIC | Age: 60
End: 2025-06-13

## 2025-06-13 ENCOUNTER — OFFICE VISIT (OUTPATIENT)
Dept: OTOLARYNGOLOGY | Facility: CLINIC | Age: 60
End: 2025-06-13

## 2025-06-13 DIAGNOSIS — H91.90 HEARING LOSS, UNSPECIFIED HEARING LOSS TYPE, UNSPECIFIED LATERALITY: Primary | ICD-10-CM

## 2025-06-13 DIAGNOSIS — H90.3 SENSORINEURAL HEARING LOSS, BILATERAL: ICD-10-CM

## 2025-06-13 DIAGNOSIS — H92.09 OTALGIA, UNSPECIFIED LATERALITY: ICD-10-CM

## 2025-06-13 DIAGNOSIS — H90.3 SENSORINEURAL HEARING LOSS (SNHL), BILATERAL: Primary | ICD-10-CM

## 2025-06-13 PROCEDURE — 92557 COMPREHENSIVE HEARING TEST: CPT | Performed by: AUDIOLOGIST

## 2025-06-13 PROCEDURE — 92567 TYMPANOMETRY: CPT | Performed by: AUDIOLOGIST

## 2025-06-13 RX ORDER — CELECOXIB 200 MG/1
200 CAPSULE ORAL DAILY PRN
Qty: 30 CAPSULE | Refills: 0 | Status: SHIPPED | OUTPATIENT
Start: 2025-06-13 | End: 2025-07-13

## 2025-06-13 RX ORDER — CYCLOBENZAPRINE HCL 5 MG
5 TABLET ORAL NIGHTLY
Qty: 30 TABLET | Refills: 1 | Status: SHIPPED | OUTPATIENT
Start: 2025-06-13

## 2025-06-13 RX ORDER — BLOOD SUGAR DIAGNOSTIC
STRIP MISCELLANEOUS 3 TIMES DAILY
COMMUNITY
Start: 2025-01-30

## 2025-06-13 RX ORDER — METRONIDAZOLE 7.5 MG/G
1 GEL VAGINAL NIGHTLY
COMMUNITY
Start: 2025-05-20

## 2025-06-13 RX ORDER — FEXOFENADINE HYDROCHLORIDE 180 MG/1
180 TABLET, FILM COATED ORAL DAILY
COMMUNITY
Start: 2025-04-22

## 2025-06-13 RX ORDER — ERGOCALCIFEROL 1.25 MG/1
CAPSULE ORAL
COMMUNITY

## 2025-06-13 RX ORDER — HYDROCODONE BITARTRATE AND ACETAMINOPHEN 5; 325 MG/1; MG/1
TABLET ORAL
COMMUNITY
Start: 2025-02-01

## 2025-06-13 RX ORDER — CLOTRIMAZOLE 2 G/100G
CREAM VAGINAL
COMMUNITY
Start: 2025-05-08

## 2025-06-13 RX ORDER — NITROFURANTOIN 25; 75 MG/1; MG/1
100 CAPSULE ORAL 2 TIMES DAILY
COMMUNITY
Start: 2025-04-30

## 2025-06-13 RX ORDER — LEVOFLOXACIN 750 MG/1
750 TABLET, FILM COATED ORAL DAILY
COMMUNITY
Start: 2025-01-30

## 2025-06-13 RX ORDER — ALBUTEROL SULFATE 90 UG/1
2 INHALANT RESPIRATORY (INHALATION) EVERY 6 HOURS PRN
COMMUNITY
Start: 2025-01-30

## 2025-06-13 RX ORDER — NAPROXEN 500 MG/1
TABLET ORAL
COMMUNITY
Start: 2024-12-23

## 2025-06-13 RX ORDER — BLOOD-GLUCOSE METER
EACH MISCELLANEOUS
COMMUNITY
Start: 2025-01-30

## 2025-06-13 RX ORDER — LANCETS
1 EACH MISCELLANEOUS 3 TIMES DAILY
COMMUNITY
Start: 2025-01-30

## 2025-06-13 RX ORDER — OMEPRAZOLE 40 MG/1
CAPSULE, DELAYED RELEASE ORAL
COMMUNITY
Start: 2025-01-24

## 2025-06-13 RX ORDER — FLUCONAZOLE 150 MG/1
150 TABLET ORAL DAILY
COMMUNITY
Start: 2025-05-10

## 2025-06-13 NOTE — PROGRESS NOTES
Joss Carmen is a 59 year old female.    Chief Complaint   Patient presents with    Ear Problem     Patient is here due to pain in ears, reports irritation in ears as well. Reports itchiness in ears x 3 weeks . Reports pressure in ears          HISTORY OF PRESENT ILLNESS  1/17 She presents with a two-year history of multiple nasal and ear symptoms. She's had ringing in the left ear which started about 2 years ago also has noted nasal congestion watery eyes and headache for the same amount of time. The headaches seem to be in the left temple region. She's been diagnosed with sinus problems in the past. Thought to possibly have jaw joint issues as well. Currently on no medications for either congestion or inflammation. I did personally review her audiogram and went over her recent and previous studies with her today. This demonstrates mild sensorineural hearing loss which is unchanged over 2 years.     6/13/25 She presents with a history of issues with her ears primarily the left side.  She reports itchiness discomfort sensitivity to sound and other ear symptoms recently.  This seems to come and go.  I last saw her in 2017 and at that time I suspected that she had TMJ issues as her symptoms are very similar as to what they were then.  Feels pressure in both ears.  Last audiogram was performed in 2024.  We did review that study and she did have another audiogram performed today demonstrating essentially unchanged mild downsloping to moderate sensory hearing loss bilaterally which is symmetric with normal speech discrimination scores tympanic membrane movement and middle ear pressures.  She does grind her teeth does not have a bite guard but she will be getting one soon.  Complains of pain and tenderness in the temporal regions bilaterally as well as occipital regions and some discomfort in her cheeks in front of her ears bilaterally.      Social Hx on file[1]    Family History[2]    Past Medical  History[3]    Past Surgical History[4]      REVIEW OF SYSTEMS    System Neg/Pos Details   Constitutional Negative Fatigue, fever and weight loss.   ENMT Negative Drooling.   Eyes Negative Blurred vision and vision changes.   Respiratory Negative Dyspnea and wheezing.   Cardio Negative Chest pain, irregular heartbeat/palpitations and syncope.   GI Negative Abdominal pain and diarrhea.   Endocrine Negative Cold intolerance and heat intolerance.   Neuro Negative Tremors.   Psych Negative Anxiety and depression.   Integumentary Negative Frequent skin infections, pigment change and rash.   Hema/Lymph Negative Easy bleeding and easy bruising.           PHYSICAL EXAM    [facility] 03/28/2015 (Approximate)        Constitutional Normal Overall appearance - Normal.   Psychiatric Normal Orientation - Oriented to time, place, person & situation. Appropriate mood and affect.   Neck Exam Normal Inspection - Normal. Palpation - Normal. Parotid gland - Normal. Thyroid gland - Normal.   Eyes Normal Conjunctiva - Right: Normal, Left: Normal. Pupil - Right: Normal, Left: Normal. Fundus - Right: Normal, Left: Normal.   Neurological Normal Memory - Normal. Cranial nerves - Cranial nerves II through XII grossly intact.   Head/Face Normal Facial features - Normal. Eyebrows - Normal. Skull - Normal.        Nasopharynx Normal External nose - Normal. Lips/teeth/gums - Normal. Tonsils - Normal. Oropharynx - Normal.   Ears Normal Inspection - Right: Normal, Left: Normal. Canal - Right: Normal, Left: Normal. TM - Right: Normal, Left: Normal.   Skin Normal Inspection - Normal.   TMJ  Tender to palpation bilaterally   Lymph Detail Normal Submental. Submandibular. Anterior cervical. Posterior cervical. Supraclavicular.        Nose/Mouth/Throat Normal External nose - Normal. Lips/teeth/gums - Normal. Tonsils - Normal. Oropharynx - Normal.   Nose/Mouth/Throat Normal Nares - Right: Normal Left: Normal. Septum -Normal  Turbinates - Right: Normal, Left:  Normal.     Medications - Current[5]  ASSESSMENT AND PLAN    1. Hearing loss, unspecified hearing loss type, unspecified laterality  - Audiology Referral - [place] ([facility])    2. Otalgia, unspecified laterality  Ultimately unchanged hearing from about a year ago.  Some mild downsloping to moderate symmetric sensorineural hearing loss.  She does have significant TMJ tenderness as well as masseter tenderness a lot of her headaches and facial pain muscles of mastication.  I have recommended that she trial Celebrex cyclobenzaprine warm heat soft diet chewing both sides of mouth and that she initiate use of her bite guard ASAP.  We did discuss some issues such as the use of Botox for TMJ issues.  I will give her a physical therapy referral to initiate TMJ physiotherapy.  Return to see me in 1 month for reevaluation.  - Physical Therapy Referral - [facility] Locations        This note was prepared using Dragon Medical voice recognition dictation software. As a result errors may occur. When identified these errors have been corrected. While every attempt is made to correct errors during dictation discrepancies may still exist    Sebas Mckenzie MD    6/13/2025    4:40 PM         [1]   Social History  Socioeconomic History    Marital status:    Tobacco Use    Smoking status: Never    Smokeless tobacco: Never   Vaping Use    Vaping status: Never Used   Substance and Sexual Activity    Alcohol use: Not Currently    Drug use: Never    Sexual activity: Not Currently     Partners: Male   Other Topics Concern    Caffeine Concern Yes     Comment: tea occ    Exercise Yes     Comment: trys to walk on treadmill, biking   [2]   Family History  Problem Relation Age of Onset    Stroke Father     Heart Disorder Father         CHF    Diabetes Father     Hypertension Father     Heart Attack Father     Hypertension Mother     Colon Polyps Mother     Lung Disorder Maternal Grandfather     Diabetes Brother     Cancer Maternal  Aunt 40        ovarian   [3]   Past Medical History:   Abdominal distention    Abdominal pain    Abnormal uterine bleeding    Amenorrhea    Anxiety state, unspecified    Arthritis    Back pain    Belching    Bloating    Blurred vision    Chest pain on exertion    Chronic cough    Constipation    Diabetes mellitus (HCC)    pre diabetic    Dyspareunia    Easy bruising    Fatigue    Flatulence/gas pain/belching    Frequent urination    H/O mammogram    benign pt stated    H/O mammogram    normal    Heartburn    High cholesterol    Hoarseness, chronic    Indigestion    Irregular bowel habits    Leaking of urine    Leg swelling    Loss of appetite    Nausea    Obstructive sleep apnea (adult) (pediatric)    AHI 5.3 SaO2 cole 81 % autoPAP 6-16 HME    Pain in joints    Pain with bowel movements    Pap smear for cervical cancer screening    wnl pt stated    Pap smear for cervical cancer screening    wnl, neg hpv    Pulmonary embolism (HCC)    Sleep disturbance    Stress    Uncomfortable fullness after meals    Wears glasses    Weight gain    Wheezing   [4]   Past Surgical History:  Procedure Laterality Date      ,     Cholecystectomy      Hip replacement surgery      Lap adjustable gastric band      Had removed in     Oophorectomy      Other surgical history  2015    benign    Other surgical history Right     R hip replacement    Removal gallbladder      Tubal ligation     [5]   Current Outpatient Medications:     albuterol 108 (90 Base) MCG/ACT Inhalation Aero Soln, Inhale 2 puffs into the lungs every 6 (six) hours as needed for Wheezing or Shortness of Breath., Disp: , Rfl:     Blood Glucose Monitoring Suppl (ACCU-CHEK GUIDE) w/Device Does not apply Kit, USE TO TEST BLOOD SUGAR, Disp: , Rfl:     3 DAY VAGINAL 2 % Vaginal Cream, INSERT 1 APPLICATORFUL VAGINALLY DAILY FOR 3 DAYS, Disp: , Rfl:     ergocalciferol 1.25 MG (84113 UT) Oral Cap, every 14 (fourteen) days., Disp: , Rfl:      ALLERGY RELIEF 180 MG Oral Tab, Take 1 tablet (180 mg total) by mouth daily., Disp: , Rfl:     fluconazole 150 MG Oral Tab, Take 1 tablet (150 mg total) by mouth daily., Disp: , Rfl:     ACCU-CHEK GUIDE TEST In Vitro Strip, by In Vitro route 3 (three) times daily., Disp: , Rfl:     HYDROcodone-acetaminophen 5-325 MG Oral Tab, TAKE 1 TO 2 TABLETS BY MOUTH EVERY NIGHT AS NEEDED FOR SEVERE PAIN, Disp: , Rfl:     Accu-Chek Softclix Lancets Does not apply Misc, 1 Lancet by Finger stick route 3 (three) times daily., Disp: , Rfl:     levoFLOXacin 750 MG Oral Tab, Take 1 tablet (750 mg total) by mouth daily. FOR 10 DAYS, Disp: , Rfl:     metroNIDAZOLE 0.75 % Vaginal Gel, Place 1 Applicatorful vaginally nightly., Disp: , Rfl:     naproxen 500 MG Oral Tab, , Disp: , Rfl:     nitrofurantoin monohydrate macro 100 MG Oral Cap, Take 1 capsule (100 mg total) by mouth 2 (two) times daily., Disp: , Rfl:     Omeprazole 40 MG Oral Capsule Delayed Release, , Disp: , Rfl:     celecoxib 200 MG Oral Cap, Take 1 capsule (200 mg total) by mouth daily as needed for Pain., Disp: 30 capsule, Rfl: 0    cyclobenzaprine 5 MG Oral Tab, Take 1 tablet (5 mg total) by mouth nightly., Disp: 30 tablet, Rfl: 1    estradiol (ESTRACE) 0.1 MG/GM Vaginal Cream, Apply 1/2 gram vaginally 2 times per week., Disp: 42 g, Rfl: 3    Cholecalciferol (VITAMIN D3 OR), Take by mouth., Disp: , Rfl:     Ascorbic Acid (VITAMIN C OR), Take by mouth., Disp: , Rfl:     Cyanocobalamin (B-12 OR), Take by mouth., Disp: , Rfl:     ZINC OR, Use as directed in the mouth or throat., Disp: , Rfl:     BIOTIN OR, Take by mouth., Disp: , Rfl:     linaCLOtide (LINZESS) 290 MCG Oral Cap, Take 1 capsule (290 mcg total) by mouth daily., Disp: 90 capsule, Rfl: 3    Esomeprazole Magnesium 40 MG Oral Capsule Delayed Release, , Disp: , Rfl:     Meloxicam 15 MG Oral Tab, , Disp: , Rfl:     ondansetron 8 MG Oral Tablet Dispersible, , Disp: , Rfl:     famotidine 40 MG Oral Tab, Take 1 tablet  (40 mg total) by mouth daily., Disp: 90 tablet, Rfl: 1    benzonatate 100 MG Oral Cap, Take 1 capsule (100 mg total) by mouth 3 (three) times daily as needed for cough., Disp: 30 capsule, Rfl: 0    traMADol 50 MG Oral Tab, Take 1 tablet (50 mg total) by mouth every 12 (twelve) hours as needed for Pain., Disp: , Rfl:     SUMAtriptan Succinate 100 MG Oral Tab, , Disp: , Rfl:     cetirizine 10 MG Oral Tab, Take 1 tablet (10 mg total) by mouth in the morning., Disp: , Rfl:     hyoscyamine sulfate 0.125 MG Oral Tablet Dispersible, Take 1 tablet (0.125 mg total) by mouth every 4 (four) hours as needed., Disp: , Rfl:     aspirin-acetaminophen-caffeine 250-250-65 MG Oral Tab, Take 2 tablets by mouth every 6 (six) hours as needed for Pain., Disp: , Rfl:     Calcium Ascorbate 500 MG Oral Tab, Take 1 tablet by mouth once daily., Disp: , Rfl:     aspirin 81 MG Oral Tab, Take 1 tablet (81 mg total) by mouth daily., Disp: , Rfl:     ClonazePAM 1 MG Oral Tab, Take 1 tablet (1 mg total) by mouth nightly as needed for Anxiety., Disp: , Rfl:

## 2025-06-16 ENCOUNTER — LAB ENCOUNTER (OUTPATIENT)
Dept: LAB | Age: 60
End: 2025-06-16
Attending: INTERNAL MEDICINE
Payer: MEDICARE

## 2025-06-16 LAB
CHOLEST SERPL-MCNC: 210 MG/DL (ref ?–200)
CREAT UR-SCNC: 100.6 MG/DL
DHEA-S SERPL-MCNC: 33.6 UG/DL (ref 25.9–460.2)
EST. AVERAGE GLUCOSE BLD GHB EST-MCNC: 131 MG/DL (ref 68–126)
FASTING PATIENT LIPID ANSWER: YES
HBA1C MFR BLD: 6.2 % (ref ?–5.7)
HDLC SERPL-MCNC: 68 MG/DL (ref 40–59)
LDLC SERPL CALC-MCNC: 117 MG/DL (ref ?–100)
MICROALBUMIN UR-MCNC: <0.3 MG/DL
NONHDLC SERPL-MCNC: 142 MG/DL (ref ?–130)
PROLACTIN SERPL-MCNC: 4.4 NG/ML
TESTOST SERPL-MCNC: 21.32 NG/DL (ref ?–35.92)
TRIGL SERPL-MCNC: 144 MG/DL (ref 30–149)
VIT B12 SERPL-MCNC: 1719 PG/ML (ref 211–911)
VLDLC SERPL CALC-MCNC: 25 MG/DL (ref 0–30)

## 2025-06-16 PROCEDURE — 82607 VITAMIN B-12: CPT | Performed by: INTERNAL MEDICINE

## 2025-06-16 PROCEDURE — 82627 DEHYDROEPIANDROSTERONE: CPT | Performed by: INTERNAL MEDICINE

## 2025-06-16 PROCEDURE — 84146 ASSAY OF PROLACTIN: CPT | Performed by: INTERNAL MEDICINE

## 2025-06-16 PROCEDURE — 36415 COLL VENOUS BLD VENIPUNCTURE: CPT | Performed by: INTERNAL MEDICINE

## 2025-06-16 PROCEDURE — 80061 LIPID PANEL: CPT | Performed by: INTERNAL MEDICINE

## 2025-06-16 PROCEDURE — 84403 ASSAY OF TOTAL TESTOSTERONE: CPT | Performed by: INTERNAL MEDICINE

## 2025-06-16 PROCEDURE — 83036 HEMOGLOBIN GLYCOSYLATED A1C: CPT | Performed by: INTERNAL MEDICINE

## 2025-06-16 PROCEDURE — 82043 UR ALBUMIN QUANTITATIVE: CPT | Performed by: INTERNAL MEDICINE

## 2025-06-16 PROCEDURE — 82570 ASSAY OF URINE CREATININE: CPT | Performed by: INTERNAL MEDICINE

## 2025-06-17 ENCOUNTER — TELEPHONE (OUTPATIENT)
Dept: ENDOCRINOLOGY CLINIC | Facility: CLINIC | Age: 60
End: 2025-06-17

## 2025-06-17 DIAGNOSIS — E78.5 DYSLIPIDEMIA: Primary | ICD-10-CM

## 2025-06-17 RX ORDER — ROSUVASTATIN CALCIUM 5 MG/1
5 TABLET, COATED ORAL NIGHTLY
Qty: 90 TABLET | Refills: 1 | Status: SHIPPED | OUTPATIENT
Start: 2025-06-17

## 2025-06-17 NOTE — TELEPHONE ENCOUNTER
Patient would like to go over her results from labs and urine. Patient says their are some that show abnormal. Patient would like for Dr. Lyn to contact her regarding other concerns.  Please call at [phone],thanks.

## 2025-06-17 NOTE — TELEPHONE ENCOUNTER
Doctor Riki  Please read bellow.  Patient is concerned her lab are abnormal and is asking for feedback.  Please advise.  Thank you.    She wants to know if those are a resulting on her felling: extremely tired, exhausted, not feeling like self, pressure on one side of face and on her head, headache, and ringing in ear.    Somebody told her it could be TMJ - but thinks mostly due to abnormal labs.    She takes gummy biotin - asks if that can raise B12? (B12 is high)    Component      Latest Ref Rng 6/16/2025   Cholesterol, Total      <200 mg/dL 210 (H)    HDL Cholesterol      40 - 59 mg/dL 68 (H)    Triglycerides      30 - 149 mg/dL 144    LDL Cholesterol Calc      <100 mg/dL 117 (H)    VLDL      0 - 30 mg/dL 25    NON-HDL CHOLESTEROL      <130 mg/dL 142 (H)    Patient Fasting for Lipid? Yes    MALB URINE      mg/dL <0.30    CREATININE UR RANDOM      mg/dL 100.60    MALB/CRE CALC --    HEMOGLOBIN A1c      <5.7 % 6.2 (H)    ESTIMATED AVERAGE GLUCOSE      68 - 126 mg/dL 131 (H)    TESTOSTERONE      <=35.92 ng/dL ng/dL 21.32    DHEA SULFATE      25.9 - 460.2 ug/dL 33.6    Prolactin      No Established Reference Range for Females ng/mL 4.4    Vitamin B12      211 - 911 pg/mL 1,719 (H)      Last Office Visit (6/2/2025):

## 2025-06-17 NOTE — TELEPHONE ENCOUNTER
Dr. Lyn --    Spoke to pt. She's asking if you recommend her starting on any diabetes medication since A1c was 6.2? She doesn't want to be on metformin & no glp-1's ( (t/f before    Provided results and recommendations written below by MD, she verbalized understanding. Pt is amenable to starting rosuvasatin 5mg, RX sent to pharmacy on file. Lipid panel order placed.

## 2025-06-17 NOTE — TELEPHONE ENCOUNTER
A1c has improved from before; it was 6.5 % in Dec 2024   Hence recommend working on lifestyle changes for now  Thanks

## 2025-06-17 NOTE — TELEPHONE ENCOUNTER
Vitamin B is elevated Please stop all over the counter vitamin B abd biotin  replacement and follow up with your primary care physician for further recommendations regarding this Thanks     Vitamin D is normal DHEAS normal prolactin normal Urine protein normal A1c has improved LDL has improved but still over 100.  Elevated cholesterol increases your risk of a heart attack and stroke. Please initiate a moderate to vigorous physical activity program of at least 30-45 minutes a day, as tolerated, 3 to 4 times per week. This should include cardiovascular exercise like running/elliptical as well as some resistance or weight training. Eat a low fat, low cholesterol diet with increased intake of low fat dairy products(yogurt for example), fish, poultry, legumes, nuts, whole grains. Limit intake of fried/fast foods, saturated fats(fat that is solid at room temperature, use olive oil instead), red meats, high sodium canned foods, sweets and sugary drinks. Lose weight to maintain a healthy weight. Do not smoke and limit alcohol intake. Please let me know if you have any questions. We will recheck your cholesterol pane on a regular basis   https://healthfinder.gov/healthtopics/category/everyday-healthy-living/nutrition/heart-healthy-foods-shopping-list       If she likes we can also start her on a low dose of cholesterol lowering medication called rosuvastatin 5 mg daily   Main SE: elevation in liver enzymes and muscle cramping   Fasting lipid panel in 3 months    Thanks

## 2025-06-19 ENCOUNTER — OFFICE VISIT (OUTPATIENT)
Dept: INTERNAL MEDICINE CLINIC | Facility: CLINIC | Age: 60
End: 2025-06-19
Payer: MEDICARE

## 2025-06-19 VITALS
BODY MASS INDEX: 37.9 KG/M2 | RESPIRATION RATE: 18 BRPM | HEIGHT: 59 IN | DIASTOLIC BLOOD PRESSURE: 74 MMHG | WEIGHT: 188 LBS | HEART RATE: 78 BPM | SYSTOLIC BLOOD PRESSURE: 110 MMHG | OXYGEN SATURATION: 98 %

## 2025-06-19 DIAGNOSIS — G47.33 OSA (OBSTRUCTIVE SLEEP APNEA): ICD-10-CM

## 2025-06-19 DIAGNOSIS — E78.5 HYPERLIPIDEMIA, UNSPECIFIED HYPERLIPIDEMIA TYPE: ICD-10-CM

## 2025-06-19 DIAGNOSIS — Z51.81 ENCOUNTER FOR THERAPEUTIC DRUG MONITORING: Primary | ICD-10-CM

## 2025-06-19 DIAGNOSIS — E55.9 VITAMIN D DEFICIENCY: ICD-10-CM

## 2025-06-19 DIAGNOSIS — I10 ESSENTIAL HYPERTENSION: ICD-10-CM

## 2025-06-19 DIAGNOSIS — Z91.09 ENVIRONMENTAL ALLERGIES: ICD-10-CM

## 2025-06-19 DIAGNOSIS — E11.9 TYPE 2 DIABETES MELLITUS WITHOUT COMPLICATION, WITHOUT LONG-TERM CURRENT USE OF INSULIN (HCC): ICD-10-CM

## 2025-06-19 DIAGNOSIS — E88.819 INSULIN RESISTANCE: ICD-10-CM

## 2025-06-19 DIAGNOSIS — E53.8 B12 DEFICIENCY: ICD-10-CM

## 2025-06-19 DIAGNOSIS — E66.01 MORBID OBESITY (HCC): ICD-10-CM

## 2025-06-19 DIAGNOSIS — I27.20 MILD PULMONARY HYPERTENSION (HCC): ICD-10-CM

## 2025-06-19 DIAGNOSIS — K21.00 GASTROESOPHAGEAL REFLUX DISEASE WITH ESOPHAGITIS WITHOUT HEMORRHAGE: ICD-10-CM

## 2025-06-19 DIAGNOSIS — R73.03 PREDIABETES: ICD-10-CM

## 2025-06-19 PROCEDURE — 99204 OFFICE O/P NEW MOD 45 MIN: CPT | Performed by: NURSE PRACTITIONER

## 2025-06-19 RX ORDER — TOPIRAMATE 25 MG/1
25 TABLET, FILM COATED ORAL 2 TIMES DAILY
Qty: 120 TABLET | Refills: 2 | Status: SHIPPED | OUTPATIENT
Start: 2025-06-19

## 2025-06-19 RX ORDER — FEXOFENADINE HCL 180 MG/1
180 TABLET ORAL DAILY
Qty: 90 TABLET | Refills: 1 | Status: SHIPPED | OUTPATIENT
Start: 2025-06-19

## 2025-06-19 NOTE — PATIENT INSTRUCTIONS
Welcome to the [facility] Program!!    I can't wait to keep working with you on this journey that is life. Always unpredictable and curveballs are no doubt to happen, but I hope you know I'm here to help and guide you along the way.  No judgment zone remember!  Ok...  Next steps:  Take Topiramate 25mg daily as needed for help with food cravings.  Look up:  - contrave  - berberine 1200mg/daily  - jardiance     Try to work on the following dietary changes this first month, tailored to your height and weight specifically, and no need to be perfect:  Daily protein recommendation to start: 120 grams  Daily carbohydrate: 140g  Daily calories: 1600    1.  Drink water with meals and throughout the day, cut down on soda and/or juice if consumed. Consider flavored water options like Bubbly, Spindrift, Hint and Tamara. Or add fresh lemon, lime, cinnamon sticks, cucumber, orange to your water!  2.  Eat breakfast daily and focus on having protein with each meal, examples include: greek yogurt, cottage cheese, hard boiled egg, whole grain toast with peanut butter/almond butter/avocado.   3.  Reduce refined carbohydrates and sugars which includes items such as sweets, as well as rice, pasta, and bread. Make sure to choose whole grain options when having them with just 1 serving per meal about the size of your inner palm. I always say, try to reduce how much you eat that comes from a bag. Yes, including chips and crackers and cookies.  4.  Consume non starchy veggies daily working towards making them a good 50% of your daily food intake. Add them to lunch and dinner consistently.  5.  Optional can start a daily probiotic: VSL#3, (order on line at www.vsl3.com). Take 1 capsule daily with water for 30 days, then reduce to 1 every other day (this will reduce the cost). Capsules can be left out for 2 weeks, but then must be refrigerated.      Please download palak My Fitness Pal, LoseIt! Or MyNet Diary to monitor  daily dietary intake and you will be able to see if you are eating the right amount of calories or too much or too little which would hinder weight loss. Additionally this will help to see your daily carbohydrate and protein intake. When you set the cary up choose 1-2 lbs/week as a goal.  Keeping a paper food journal is an option as well to remain accountable for your choices- this is the start to mindful eating! A low calorie diet has been consistently shown to support weight loss.     Continue or start exercising to help establish a routine. If not already exercising begin with 1 day and progress as able with long-term goal of 30 minutes 5 days a week at a minimum. I like to say 20 min of moving your body 3 times a week and build from there. Find something you like! Onefeatube has free workouts and range in time frame!      Meditation and exercise daily can help manage and control stress and lead to better sleep. Chronic stress and interrupted sleep can make weight loss very difficult.  Exercising is one way to help with stress, but meditation using the CALM Cary or another comparable alternative can be done in your home or place of work with little time commitment. This Cary can also help work on behavior change and improve sleep. Check out the segment under Calm Masterclass and listen to The 4 Pillars of Health. A great way to begin learning about the foundation of lifestyle with practical tips to use in your every day.     Check out www.yourweightmatters.org blog for continued daily support and education along this weight loss journey!    Patient Resources:     Personal Training/Fitness Classes/Health Coaching     [facility] @ [email or web address]-[email or web address]/[email or web address]-center Full fitness center with group fitness and personal training. Discount available as client of [facility].  Health Coaching and Personal Training with Fanny Mccloud at our [facility]  Center- individual weekly coaching with option to add personal training and small group fitness classes targeted at weight loss- [phone] and/or email @ [email or web address]  360FIT [place] http://www.[email or web address]. Group Fitness [phone] and/or email [email or web address] [email or web address]  [facility] Fitness Centers with multiple locations: Dtime (www.Assembly Pharma), Fight My Monster The Kamego (www.SplitSecnd), Bizware (www.GROUNDBOOTH), The Exercise  (www.exercisecoachPlanview)     Online Fitness  Fitness  on Youtube  Sit and Be Fit - Chair exercise series Www.sitandbefit.org     Apps for on the Go Fitness  Nettie 7 Minute Workout (orange box with white 7) - free on the go HIIT training cary  G-[employer]ator [employer] Cary @ www.onepeloton.com - I LOVE this cary - and annual membership is like $200 you don't even need a ZeroPercent.us equipment, but they have 5min, 10min, 15min, etc up to 60min workouts and it feels like you are one-on-one with a  - they have multiple trainers and levels of their program! they have bodyweight sessions, variations in times of classes, great motivator to have someone doing their workout with you!     Nutrition Trackers and Tools  LoseIT! And My Fitness Pal apps and MyNetDiary  NOOM - virtual health coaching  FitFoundation (healthy meals on the go) in [place] @ www.heaoxrhrueljr5i.com  Senthil FELIX @ www.bistromd.com and Dlkqaq90 (keto and low carb plans recommended) @ www.iksbng08.com, Metabolic Meals @ www.MyMetabolicMeals.com - individual prepared meals to go  Gobble, Blue Apron, Home , Every Plate, Sunbasket- on line meal delivery programs for preparation at home  Meal Village in [place] for homemade meals to go @ www.mealvillage.com  Diet Doctor @ www.dietdoctor.com - low carb swaps  Yummly - meal prep and planning cary (www.yummly.com)     Stress Management/Behavior/Mindful Eating  CALM meditation cary  (www.calm.com)  Headspace  Am I Hungry? Mindful eating virtual  palak  Www.yourweightmatters.org - Obesity Action Coalition sponsored Blog posts daily  Motivation palak (black box with white \")- daily supportive messages sent to your phone  Aura - pretty cheap, like $70 for the year subscription - as short as 4min meditation or as long as you want, has nighttime calming music for sleep too!     Books/Video Education/Podcasts  Mindless Eating by Hardy Patel - has a free YouTube channel but also specifies education for perimenopause and menopause women  Why We Get Sick by Donis Guadarrama (a book about insulin resistance)  Free YouTube channel  Berberine 1200mg/day  Atomic Habits by Mookie London (a book about taking small steps to promote greater behavior change)   Can't Hurt Me by Ned Mackey (a book exploring the power of discipline in achieving your goals)  Your Body in Balance: The New Science of Food, Hormones, and Health by Dr. Jj Vega  The Menopause Diet Plan by Marcy Bruno and Darcy Bowman  The Complete Guide to fasting by Dr. Cruz  Sugar, Salt & Fat by Nahomy Salvador, Ph.D, R.D.  Weight Loss Surgery Will Not Treat Food Addiction by Flakita Malcolm Ph.D  The Game Changers- Anew Oncology Documentary on plant based nutrition  Fed Up - documentary about obesity (Free on Utube)  The Truth About Sugar - documentary on sugar (Free on Utube, https://youtu.be/4X3weytRO9o)  The Dr. Kumari T5 Wellness Plan by Dr. Costa Kumari MD  Fitlosophy Fitspiration - journal to better health (found at [employer] in fitness aisle)  What Happened to You?- a look at the impact trauma has on behavior written by César Morales and Dr. Jerod Kinney  Whole Again by Ryne Pereira - discovering your true self after trauma  Loan Guido talk on Anew Oncology, The Call to Courage  Podcasts: The Exam Room by the Physician's Committee, Nutrition Facts by Dr. Austin  Bright Spots and Landmines by Chuck Guido - A diabetes guide  Why  Aldo don't get ulcers - book on the effects of chronic stress!    We are here to support you with weight loss, but please remember that you still need your primary care provider for your routine health maintenance.

## 2025-06-19 NOTE — PROGRESS NOTES
[address], [address] [place] [id number]  Dept: [phone]       Date of Consult:  2025    Patient:  Joss Carmen  :      1965  MRN:      [id number]    Referring Provider: Pt has been seen in clinic 2018    Chief Complaint:    Chief Complaint   Patient presents with    Weight Problem     Pt is back to reestablCounts include [facility] beds at the [facility] for wt lose.     SUBJECTIVE     History of Present Illness:  Joss Carmen is a new patient and has been referred to me for weight loss and weight management recommendations and evaluation and treatment.       Patient is considering medications; no to bariatric surgery for weight loss.  Patient denies any history of eating disorder(s).    Pt had lab band in  - had it removed in . Pt states she only lost 22lbs in one year and had trouble with the band anyway which is why they removed it.  Pt states her endocrinologist did put her on Ozempic and Mounjaro - she did try taking the medications but noticed it made her hard to swallow and she already has a hx of thyroid nodules. Pt states her and her endocrinologist made the decision for pt to not be on GLP-1 injections. Pt states she has been able to lower her A1c on her own with diet and exercise. Pt states Metformin hurt her stomach.    Pt had been hospitalized 2024. Had hemoglobin A1c of 6.5% at that time. Pt states she saw her endocrinologist and saw diabetes educator and diabetes dietitian.     On a scale of 1 to 10, patient's desire to lose weight is a 10.  On a scale of 1 to 10, patient's belief that she can lose weight is a 8.    Patient's goal weight:  - lose 40-50lbs and maintain weight  Biggest weight loss in the past: 10-30 lb  How weight loss was achieved: diet and exercise - has to be strict  Heaviest weight ever: 215lbs (2017)  Previous use of medical weight loss medications: Ozempic and Mounjaro,  Phentermine  Barriers to weight loss: prediabetic, sensitive to meds    Physical activity: Type: stationary bike Frequency: 3-4 days/week  Sleep: 5-8 hours/night    Sleep screening: + snore, tired during the day    Past Medical History: Past Medical History[1]    OBJECTIVE     Vitals: /74   Pulse 78   Resp 18   Ht 4' 11\" (1.499 m)   Wt 188 lb (85.3 kg)   LMP 03/28/2015 (Approximate)   SpO2 98%   BMI 37.97 kg/m²      Wt Readings from Last 6 Encounters:   06/19/25 188 lb (85.3 kg)   06/02/25 192 lb (87.1 kg)   05/14/25 187 lb (84.8 kg)   05/10/25 187 lb (84.8 kg)   03/10/25 202 lb (91.6 kg)   01/26/25 204 lb (92.5 kg)      Patient Medications:  Current Medications[2]  Allergies:  Morphine and Metformin   Co-morbidities:  non-insulin dependent diabetes and GERD, KYLE, hyperlipidemia   Social History:  Reviewed  Surgical History:  Past Surgical History[3]  Family History:  Family History[4]    Typical Dietary Intake:  Breakfast AM Snack Lunch PM Snack Dinner   reviewed         Nutritional Goals Reviewed and Discussed:     Calorie-controlled diet: 1600, Limit carbohydrates to 140 gms per day, Eat 100-200 calories within 1 hour of waking up, and Eat 3-4 cups of fresh fruit or vegetables daily. Protein: 120    Behavior Modifications Reviewed and Discussed:    Eat breakfast, Eat 3 meals per day, Plan meals in advance, Read nutrition labels, Drink 64oz of water per day, Maintain a daily food journal, No drinking 30 minutes before or after meals, Utilize portion control strategies to reduce calorie intake, Identify triggers for eating and manage cues, and Eat slowly and take 20 to 30 minutes to complete each meal    BODY SCAN results:   Current body weight: 188.1lbs  Total body fat: 45.2%  Visceral fat: 16  Muscle Mass: 23.3lbs  Total body water: 41.8%     ROS:  Review of Systems   HENT:  Positive for congestion and ear pain.         Pt c/o left ear pain and congestion, TMJ symptoms on left side of face    Musculoskeletal:  Positive for arthralgias.        Right hip pain  Lower back pain   Psychiatric/Behavioral:  Positive for sleep disturbance.    All other systems reviewed and are negative.     Physical Exam:  Physical Exam  Vitals reviewed.   Constitutional:       Appearance: Normal appearance.   HENT:      Head: Normocephalic and atraumatic.      Left Ear: Tympanic membrane, ear canal and external ear normal.   Pulmonary:      Effort: Pulmonary effort is normal.   Neurological:      General: No focal deficit present.      Mental Status: She is alert and oriented to person, place, and time. Mental status is at baseline.   Psychiatric:         Mood and Affect: Mood normal.         Behavior: Behavior normal.        ASSESSMENT   Encounter Diagnosis(ses):   1. Encounter for therapeutic drug monitoring    2. KYLE (obstructive sleep apnea)    3. Hyperlipidemia, unspecified hyperlipidemia type    4. Essential hypertension    5. B12 deficiency    6. Vitamin D deficiency    7. Insulin resistance    8. Prediabetes    9. Mild pulmonary hypertension (HCC)    10. Gastroesophageal reflux disease with esophagitis without hemorrhage    11. Morbid obesity (HCC)    12. Type 2 diabetes mellitus without complication, without long-term current use of insulin (HCC)    13. Environmental allergies      PLAN   Diagnoses and all orders for this visit:    Encounter for therapeutic drug monitoring  -     topiramate 25 MG Oral Tab; Take 1 tablet (25 mg total) by mouth 2 (two) times daily.    KYLE (obstructive sleep apnea)  -     topiramate 25 MG Oral Tab; Take 1 tablet (25 mg total) by mouth 2 (two) times daily.    Hyperlipidemia, unspecified hyperlipidemia type  -     topiramate 25 MG Oral Tab; Take 1 tablet (25 mg total) by mouth 2 (two) times daily.    Essential hypertension  -     topiramate 25 MG Oral Tab; Take 1 tablet (25 mg total) by mouth 2 (two) times daily.    B12 deficiency  -     topiramate 25 MG Oral Tab; Take 1 tablet (25 mg  total) by mouth 2 (two) times daily.    Vitamin D deficiency  -     topiramate 25 MG Oral Tab; Take 1 tablet (25 mg total) by mouth 2 (two) times daily.    Insulin resistance  -     topiramate 25 MG Oral Tab; Take 1 tablet (25 mg total) by mouth 2 (two) times daily.    Prediabetes  -     topiramate 25 MG Oral Tab; Take 1 tablet (25 mg total) by mouth 2 (two) times daily.    Mild pulmonary hypertension (HCC)  -     topiramate 25 MG Oral Tab; Take 1 tablet (25 mg total) by mouth 2 (two) times daily.    Gastroesophageal reflux disease with esophagitis without hemorrhage  -     topiramate 25 MG Oral Tab; Take 1 tablet (25 mg total) by mouth 2 (two) times daily.    Morbid obesity (HCC)  Comments:  baseline BMI: 37.97 - heaviest weight: 188lbs - 6/19/2025  Orders:  -     topiramate 25 MG Oral Tab; Take 1 tablet (25 mg total) by mouth 2 (two) times daily.    Type 2 diabetes mellitus without complication, without long-term current use of insulin (HCC)  Comments:  diagnosed 12/2024  Orders:  -     topiramate 25 MG Oral Tab; Take 1 tablet (25 mg total) by mouth 2 (two) times daily.    Environmental allergies  -     fexofenadine 180 MG Oral Tab; Take 1 tablet (180 mg total) by mouth daily.  -     topiramate 25 MG Oral Tab; Take 1 tablet (25 mg total) by mouth 2 (two) times daily.      Reviewed labs.  Last A1c value was 6.2% done 6/16/2025.   Cholesterol: 210, done on 6/16/2025.  HDL Cholesterol: 68, done on 6/16/2025.  TriGlycerides 144, done on 6/16/2025.  LDL Cholesterol: 117, done on 6/16/2025.      Lab Results   Component Value Date    T4F 0.9 06/26/2024    TSH 0.437 06/26/2024      OBESITY/WEIGHT GAIN PLAN:  Medication Management:  Start medication(s): After long discussion and explanation of medications available for weight loss, mutual patient-centered decision made for pt to take Topiramate 25mg daily to prevent food cravings. Pt states she cannot take Naltrexone d/t having narcotic pain medication at times if needed  for intense back pain. Pt states she cannot handle Metformin. Pt states she cannot handle a stimulant. Pt states she cannot take GLP-1 injection medication.     Discussed:  - Recommended intensive lifestyle and behavioral modifications for weight loss.    - Discussed importance of building muscle, maintaining muscle mass, and increasing PO protein   - Educated patient on lifestyle modifications: Mediterranean/Whole Food/Plant Strong/Low Glycemic Index diet, moderate alcohol consumption, reduced sodium intake to no more than 2,400 mg/day, and at least 150-300 minutes of moderate physical activity per week. Stress can create barrier to weight loss and exercise is a great way to reduce stress.  - Discussed the importance of whole food, plant based, minimally to non-processed diet rich in fruits, vegetables, whole grains and healthy fats, and meats/seafood without hormones, steroids, or antibiotics. Avoid processed, poor quality carbohydrates, refined grains, flour, sugar. Increase protein intake and don't skip meals.  Goals for next month:  1. Keep a food log, aim for 100 grams carbs/day.   2. Drink 64 ounces of non-caloric beverages per day. No fruit juices or regular soda.  3. Aim for 150 minutes moderate exercise per week.   4. Increase fruit and vegetable servings to 5-6 per day.    5. Improve sleep and stress, both VERY important in weight loss and management.    Discussed medication options for weight loss in detail with patient, including oral and injectable agents.   Discussed therapy options and referrals available, pt declined at this time.  Discussed dietician consultation referral and appointments for one-on-one meal prepping/planning, pt declined at this time.  Discussed fitness consultation/memberships referrals with pt, pt declined at this time.    I spent 45 minutes of face to face time with patient, 30 minutes of which were spent on nutrition, exercise, sleep, stress, weight loss/behavioral counseling  and care coordination.    Follow-up: 3 month in-person    Digna Mendez, MARIA G, FNP-BC          [1]   Past Medical History:   Abdominal distention    Abdominal pain    Abnormal uterine bleeding    Amenorrhea    Anxiety state, unspecified    Arthritis    Back pain    Belching    Bloating    Blurred vision    Chest pain on exertion    Chronic cough    Constipation    Diabetes mellitus (HCC)    pre diabetic    Dyspareunia    Easy bruising    Essential hypertension    Fatigue    Flatulence/gas pain/belching    Frequent urination    H/O mammogram    benign pt stated    H/O mammogram    normal    Heartburn    High cholesterol    Hoarseness, chronic    Indigestion    Irregular bowel habits    Leaking of urine    Leg swelling    Loss of appetite    Nausea    Obstructive sleep apnea (adult) (pediatric)    AHI 5.3 SaO2 cole 81 % autoPAP 6-16 HME    Pain in joints    Pain with bowel movements    Pap smear for cervical cancer screening    wnl pt stated    Pap smear for cervical cancer screening    wnl, neg hpv    Pulmonary embolism (HCC)    Sleep disturbance    Stress    Uncomfortable fullness after meals    Wears glasses    Weight gain    Wheezing   [2]   Current Outpatient Medications   Medication Sig Dispense Refill    fexofenadine 180 MG Oral Tab Take 1 tablet (180 mg total) by mouth daily. 90 tablet 1    topiramate 25 MG Oral Tab Take 1 tablet (25 mg total) by mouth 2 (two) times daily. 120 tablet 2    Doxycycline Hyclate 100 MG Oral Tab       rosuvastatin 5 MG Oral Tab Take 1 tablet (5 mg total) by mouth nightly. 90 tablet 1    Accu-Chek Softclix Lancets Does not apply Misc 1 Lancet by Finger stick route in the morning and 1 Lancet before bedtime. 200 each 1    ACCU-CHEK GUIDE TEST In Vitro Strip 1 each by In Vitro route in the morning and 1 each before bedtime. 200 each 1    Blood Glucose Monitoring Suppl (ACCU-CHEK GUIDE) w/Device Does not apply Kit USE TO TEST BLOOD SUGAR      ergocalciferol 1.25 MG (96869 UT) Oral  Cap every 14 (fourteen) days.      ALLERGY RELIEF 180 MG Oral Tab Take 1 tablet (180 mg total) by mouth daily.      fluconazole 150 MG Oral Tab Take 1 tablet (150 mg total) by mouth daily.      HYDROcodone-acetaminophen 5-325 MG Oral Tab TAKE 1 TO 2 TABLETS BY MOUTH EVERY NIGHT AS NEEDED FOR SEVERE PAIN      levoFLOXacin 750 MG Oral Tab Take 1 tablet (750 mg total) by mouth daily. FOR 10 DAYS      metroNIDAZOLE 0.75 % Vaginal Gel Place 1 Applicatorful vaginally nightly.      naproxen 500 MG Oral Tab       nitrofurantoin monohydrate macro 100 MG Oral Cap Take 1 capsule (100 mg total) by mouth 2 (two) times daily.      Cholecalciferol (VITAMIN D3 OR) Take by mouth.      Ascorbic Acid (VITAMIN C OR) Take by mouth.      ZINC OR Use as directed in the mouth or throat.      linaCLOtide (LINZESS) 290 MCG Oral Cap Take 1 capsule (290 mcg total) by mouth daily. 90 capsule 3    Esomeprazole Magnesium 40 MG Oral Capsule Delayed Release       Meloxicam 15 MG Oral Tab       ondansetron 8 MG Oral Tablet Dispersible       benzonatate 100 MG Oral Cap Take 1 capsule (100 mg total) by mouth 3 (three) times daily as needed for cough. 30 capsule 0    traMADol 50 MG Oral Tab Take 1 tablet (50 mg total) by mouth every 12 (twelve) hours as needed for Pain.      SUMAtriptan Succinate 100 MG Oral Tab       cetirizine 10 MG Oral Tab Take 1 tablet (10 mg total) by mouth in the morning.      hyoscyamine sulfate 0.125 MG Oral Tablet Dispersible Take 1 tablet (0.125 mg total) by mouth every 4 (four) hours as needed.      aspirin-acetaminophen-caffeine 250-250-65 MG Oral Tab Take 2 tablets by mouth every 6 (six) hours as needed for Pain.      Calcium Ascorbate 500 MG Oral Tab Take 1 tablet by mouth once daily.      aspirin 81 MG Oral Tab Take 1 tablet (81 mg total) by mouth daily.      ClonazePAM 1 MG Oral Tab Take 1 tablet (1 mg total) by mouth nightly as needed for Anxiety.      ciprofloxacin-dexamethasone 0.3-0.1 % Otic Suspension INSTILL 3  DROPS INTO AFFECTED EAR 3 TIMES DAILY FOR 7 DAYS (Patient not taking: Reported on 2025)      albuterol 108 (90 Base) MCG/ACT Inhalation Aero Soln Inhale 2 puffs into the lungs every 6 (six) hours as needed for Wheezing or Shortness of Breath. (Patient not taking: Reported on 2025)      3 DAY VAGINAL 2 % Vaginal Cream INSERT 1 APPLICATORFUL VAGINALLY DAILY FOR 3 DAYS (Patient not taking: Reported on 2025)      Omeprazole 40 MG Oral Capsule Delayed Release  (Patient not taking: Reported on 2025)      celecoxib 200 MG Oral Cap Take 1 capsule (200 mg total) by mouth daily as needed for Pain. (Patient not taking: Reported on 2025) 30 capsule 0    cyclobenzaprine 5 MG Oral Tab Take 1 tablet (5 mg total) by mouth nightly. (Patient not taking: Reported on 2025) 30 tablet 1    estradiol (ESTRACE) 0.1 MG/GM Vaginal Cream Apply 1/2 gram vaginally 2 times per week. (Patient not taking: Reported on 2025) 42 g 3    Cyanocobalamin (B-12 OR) Take by mouth. (Patient not taking: Reported on 2025)      BIOTIN OR Take by mouth. (Patient not taking: Reported on 2025)      famotidine 40 MG Oral Tab Take 1 tablet (40 mg total) by mouth daily. (Patient not taking: Reported on 2025) 90 tablet 1   [3]   Past Surgical History:  Procedure Laterality Date      ,     Cholecystectomy      Hip replacement surgery      Lap adjustable gastric band      Had removed in     Oophorectomy      Other surgical history  2015    benign    Other surgical history Right     R hip replacement    Removal gallbladder      Tubal ligation     [4]   Family History  Problem Relation Age of Onset    Stroke Father     Heart Disorder Father         CHF    Diabetes Father     Hypertension Father     Heart Attack Father     Hypertension Mother     Colon Polyps Mother     Lung Disorder Maternal Grandfather     Diabetes Brother     Cancer Maternal Aunt 40        ovarian

## 2025-06-20 ENCOUNTER — TELEPHONE (OUTPATIENT)
Dept: INTERNAL MEDICINE CLINIC | Facility: CLINIC | Age: 60
End: 2025-06-20

## 2025-06-23 ENCOUNTER — TELEPHONE (OUTPATIENT)
Dept: INTERNAL MEDICINE CLINIC | Facility: CLINIC | Age: 60
End: 2025-06-23

## 2025-06-27 ENCOUNTER — APPOINTMENT (OUTPATIENT)
Dept: CT IMAGING | Age: 60
End: 2025-06-27
Attending: PHYSICIAN ASSISTANT
Payer: MEDICARE

## 2025-06-27 ENCOUNTER — HOSPITAL ENCOUNTER (EMERGENCY)
Age: 60
Discharge: HOME | End: 2025-06-27
Payer: MEDICARE

## 2025-06-27 VITALS
SYSTOLIC BLOOD PRESSURE: 134 MMHG | RESPIRATION RATE: 18 BRPM | HEART RATE: 78 BPM | HEIGHT: 59 IN | BODY MASS INDEX: 37.9 KG/M2 | TEMPERATURE: 99 F | OXYGEN SATURATION: 99 % | WEIGHT: 188 LBS | DIASTOLIC BLOOD PRESSURE: 77 MMHG

## 2025-06-27 DIAGNOSIS — J32.9 CHRONIC CONGESTION OF PARANASAL SINUS: ICD-10-CM

## 2025-06-27 DIAGNOSIS — R51.9 DAILY HEADACHE: Primary | ICD-10-CM

## 2025-06-27 PROCEDURE — 99284 EMERGENCY DEPT VISIT MOD MDM: CPT

## 2025-06-27 PROCEDURE — 70486 CT MAXILLOFACIAL W/O DYE: CPT | Performed by: PHYSICIAN ASSISTANT

## 2025-06-27 PROCEDURE — 70450 CT HEAD/BRAIN W/O DYE: CPT | Performed by: PHYSICIAN ASSISTANT

## 2025-06-27 RX ORDER — PREDNISONE 20 MG/1
40 TABLET ORAL DAILY
Qty: 10 TABLET | Refills: 0 | Status: SHIPPED | OUTPATIENT
Start: 2025-06-27 | End: 2025-07-02

## 2025-06-27 NOTE — DISCHARGE INSTRUCTIONS
Follow-up with both your allergist and the newly provided neurologist.  Take the steroid as written.  Continue your nasal spray and oral antihistamine

## 2025-06-27 NOTE — ED PROVIDER NOTES
Patient Seen in: [facility] [place]        History  Chief Complaint   Patient presents with    Headache    Sinus Problem     Stated Complaint: Sinus pressure/headache x 1 month    Subjective:   HPI            59-year-old female.  The patient explains that she has had persistent facial pain/pressure, eye pressure, ear pressure, nasal congestion and discomfort for the past 5 weeks.  She was initially seen in a walk-in care and placed on a course of amoxicillin for left ear infection.  However, the majority of her symptoms persisted.  She then followed up with ENT who, per patient, did not see any abnormalities.  She then followed up with an allergist who placed the patient on as Astelin.  She is taking oral antihistamines.  She is performing Carine Stewart.  Despite these interventions her symptoms persist.  She has not had a fever.  She denies any acute headache.  No numbness or weakness.  No visual changes.  No chest pain or shortness of breath.      Objective:     Past Medical History:    Abdominal distention    Abdominal pain    Abnormal uterine bleeding    Amenorrhea    Anxiety state, unspecified    Arthritis    Back pain    Belching    Bloating    Blurred vision    Chest pain on exertion    Chronic cough    Constipation    Diabetes mellitus (HCC)    pre diabetic    Dyspareunia    Easy bruising    Essential hypertension    Fatigue    Flatulence/gas pain/belching    Frequent urination    H/O mammogram    benign pt stated    H/O mammogram    normal    Heartburn    High cholesterol    Hoarseness, chronic    Indigestion    Irregular bowel habits    Leaking of urine    Leg swelling    Loss of appetite    Nausea    Obstructive sleep apnea (adult) (pediatric)    AHI 5.3 SaO2 cole 81 % autoPAP 6-16 HME    Pain in joints    Pain with bowel movements    Pap smear for cervical cancer screening    wnl pt stated    Pap smear for cervical cancer screening    wnl, neg hpv    Pulmonary embolism (HCC)     Sleep disturbance    Stress    Uncomfortable fullness after meals    Wears glasses    Weight gain    Wheezing              Past Surgical History:   Procedure Laterality Date      ,     Cholecystectomy      Hip replacement surgery      Lap adjustable gastric band  2004    Had removed in     Oophorectomy      Other surgical history  2015    benign    Other surgical history Right     R hip replacement    Removal gallbladder      Tubal ligation                  Social History     Socioeconomic History    Marital status:    Tobacco Use    Smoking status: Never    Smokeless tobacco: Never   Vaping Use    Vaping status: Never Used   Substance and Sexual Activity    Alcohol use: Not Currently    Drug use: Never    Sexual activity: Not Currently     Partners: Male   Other Topics Concern    Caffeine Concern Yes     Comment: tea occ    Stress Concern No    Weight Concern Yes    Special Diet No    Exercise Yes     Comment: trys to walk on treadmill, biking    Seat Belt Yes     Social Drivers of Health     Food Insecurity: No Food Insecurity (2024)    Food Insecurity     Food Insecurity: Never true   Transportation Needs: No Transportation Needs (2024)    Transportation Needs     Lack of Transportation: No   Housing Stability: Low Risk  (2024)    Housing Stability     Housing Instability: No                                Physical Exam    ED Triage Vitals [25 1520]   /77   Pulse 78   Resp 18   Temp 98.6 °F (37 °C)   Temp src Temporal   SpO2 99 %   O2 Device None (Room air)       Current Vitals:   Vital Signs  BP: 134/77  Pulse: 78  Resp: 18  Temp: 98.6 °F (37 °C)  Temp src: Temporal    Oxygen Therapy  SpO2: 99 %  O2 Device: None (Room air)            Physical Exam     Gen: Well appearing, well groomed, alert and aware x 3  Neck: Supple, full range of motion, no thyromegaly or lymphadenopathy.  Eye examination: EOMs are intact, normal conjunctival  ENT:  No injection noted to the bilateral auditory canals; no loss of landmarks. Normal nasal mucosa without audible nasal congestion.  Oropharynx is patent without evidence of erythema, exudates or deviation.  No stridor to auscultation  Lung: No distress, RR, no retraction, breath sounds are clear bilaterally  Cardio: Regular rate and rhythm, normal S1-S2, no murmur appreciable  Skin: No sign of trauma, Skin warm and dry, no induration or sign of infection.  No rash noted        ED Course  Labs Reviewed - No data to display       CT BRAIN OR HEAD (CPT=70450)  Result Date: 6/27/2025  PROCEDURE: CT BRAIN OR HEAD (CPT=70450) INDICATIONS:  Sinus pressure/headache x 1 month COMPARISON: CT 8/16/1965 TECHNIQUE: Noncontrast CT scanning is performed through the brain. Dose reduction techniques were used. Dose information is transmitted to the ACR (American College of Radiology) NRDR (National Radiology Data Registry) which includes the Dose Index Registry FINDINGS: VENTRICLES/SULCI: Ventricles and sulci are normal in size. INTRACRANIAL: There are no abnormal extraaxial fluid collections.  There is no midline shift.  There are no intraparenchymal brain abnormalities.  There is nothing specific for acute infarct.  There is no hemorrhage or mass lesion. SINUSES: No sign of acute sinusitis. MASTOIDS: No sign of acute inflammation. SKULL: No evidence for fracture or osseous abnormality. OTHER: None.     CONCLUSION: No significant midline shift or mass effect. No acute intracranial hemorrhage. If there is persistent clinical concern, then consider MRI. Electronically Verified and Signed by Attending Radiologist: Mauricio Yi MD 6/27/2025 4:08 PM Workstation: [id number]    CT SINUS (CPT=70486)  Result Date: 6/27/2025  PROCEDURE: CT SINUS (CPT=70486) INDICATIONS: Sinus pressure/headache x 1 month PATIENT STATED HISTORY: COMPARISON: There are no comparisons for this exam. TECHNIQUE: CONTRAST USED: None FINDINGS: Sinus Chambers:   Patent  without evidence of significant mucoperiosteal thickening. Nasal Cavities:   Visualized nasal cavities are patent. No significant nasal septal deviation. Developmental Anomalies:   None Ostiomeatal Complex:   Patent within the constraints of the study. Other:  The visualized mastoid air cells and middle ear cavities are clear.  The soft tissues of the face and orbits are within normal limits within the limitations of the study.     CONCLUSION: Patent paranasal sinuses. No acute imaging findings Electronically Verified and Signed by Attending Radiologist: Katarina Levi MD 6/27/2025 4:07 PM Workstation: [id number]                      [facility]     Considering the duration of symptoms we will perform a CT of the brain and sinuses to rule out underlying abscess formation, chronic infection, brain mass or other lesions.      CONCLUSION: Patent paranasal sinuses. No acute imaging findings Electronically Verified and Signed by Attending Radiologist: Katarina Levi MD 6/27/2025 4:07 PM Workstation: [id number]      CONCLUSION: No significant midline shift or mass effect. No acute intracranial hemorrhage. If there is persistent clinical concern, then consider MRI. Electronically Verified and Signed by Attending Radiologist: Mauricio Yi MD 6/27/2025 4:08 PM Workstation: [id number]    Patient placed on a 5-day steroid course.  She will follow-up with both her allergist and she was newly referred to neurology.  Medical Decision Making      Disposition and Plan     Clinical Impression:  1. Daily headache    2. Chronic congestion of paranasal sinus         Disposition:  There is no disposition on file for this visit.  There is no disposition time on file for this visit.    Follow-up:  Fleipe Sage MD  [address] [place] [address]   [address] [address]  [phone]    Follow up            Medications Prescribed:  Current Discharge Medication List        START taking these medications    Details   predniSONE 20 MG Oral Tab Take 2  tablets (40 mg total) by mouth daily for 5 days.  Qty: 10 tablet, Refills: 0                   Supplementary Documentation:

## 2025-06-27 NOTE — ED INITIAL ASSESSMENT (HPI)
Patient here with bilateral ear pressure/pain over the past five weeks. States she was started on an antibiotic initially at . States symptoms have progressed to facial pressure and congestion. Denies fever.

## 2025-07-02 ENCOUNTER — TELEPHONE (OUTPATIENT)
Dept: ENDOCRINOLOGY CLINIC | Facility: CLINIC | Age: 60
End: 2025-07-02

## 2025-07-02 NOTE — TELEPHONE ENCOUNTER
Patient requesting lab orders to be sent to [facility].  Please call once it is available to use.  Thank you.

## 2025-07-03 NOTE — ED INITIAL ASSESSMENT (HPI)
Discharge instructions provided to patient and friend. Patient and friend verbalized understanding of the instructions. IV removed. Cath tip intact. VSS. No concerns voiced. Escorted patient via wheelchair to family member awaiting in private vehicle.   Saw pediatrist on Tuesday for \"corn\" on left heel. States pain worsening and inability to bear weight on left foot. \"I think he cut to deep\"

## 2025-07-07 ENCOUNTER — APPOINTMENT (OUTPATIENT)
Dept: GENERAL RADIOLOGY | Age: 60
End: 2025-07-07
Attending: EMERGENCY MEDICINE

## 2025-07-07 ENCOUNTER — LAB ENCOUNTER (OUTPATIENT)
Dept: LAB | Age: 60
End: 2025-07-07
Attending: INTERNAL MEDICINE
Payer: MEDICARE

## 2025-07-07 ENCOUNTER — HOSPITAL ENCOUNTER (EMERGENCY)
Age: 60
Discharge: HOME | End: 2025-07-07
Attending: EMERGENCY MEDICINE

## 2025-07-07 VITALS
RESPIRATION RATE: 16 BRPM | HEIGHT: 59 IN | SYSTOLIC BLOOD PRESSURE: 155 MMHG | HEART RATE: 71 BPM | OXYGEN SATURATION: 99 % | DIASTOLIC BLOOD PRESSURE: 77 MMHG | WEIGHT: 192.9 LBS | BODY MASS INDEX: 38.89 KG/M2 | TEMPERATURE: 97.9 F

## 2025-07-07 DIAGNOSIS — G44.209 TENSION-TYPE HEADACHE, NOT INTRACTABLE, UNSPECIFIED CHRONICITY PATTERN: Primary | ICD-10-CM

## 2025-07-07 DIAGNOSIS — M54.2 NECK PAIN: ICD-10-CM

## 2025-07-07 LAB
ALBUMIN SERPL-MCNC: 3.7 G/DL (ref 3.4–5)
ALBUMIN/GLOB SERPL: 0.9 {RATIO} (ref 1–2.4)
ALP SERPL-CCNC: 69 UNITS/L (ref 45–117)
ALT SERPL-CCNC: 20 UNITS/L
ANION GAP SERPL CALC-SCNC: 6 MMOL/L (ref 7–19)
AST SERPL-CCNC: 13 UNITS/L
BASOPHILS # BLD: 0.1 K/MCL (ref 0–0.3)
BASOPHILS NFR BLD: 1 %
BILIRUB SERPL-MCNC: 0.3 MG/DL (ref 0.2–1)
BUN SERPL-MCNC: 7 MG/DL (ref 6–20)
BUN/CREAT SERPL: 12 (ref 7–25)
CALCIUM SERPL-MCNC: 9.5 MG/DL (ref 8.4–10.2)
CHLORIDE SERPL-SCNC: 103 MMOL/L (ref 97–110)
CK SERPL-CCNC: 88 UNITS/L (ref 26–192)
CO2 SERPL-SCNC: 32 MMOL/L (ref 21–32)
CREAT SERPL-MCNC: 0.57 MG/DL (ref 0.51–0.95)
CRP SERPL-MCNC: <5 MG/L
DEPRECATED RDW RBC: 43.7 FL (ref 39–50)
EGFRCR SERPLBLD CKD-EPI 2021: >90 ML/MIN/{1.73_M2}
EOSINOPHIL # BLD: 0 K/MCL (ref 0–0.5)
EOSINOPHIL NFR BLD: 0 %
ERYTHROCYTE [DISTWIDTH] IN BLOOD: 12.7 % (ref 11–15)
ERYTHROCYTE [SEDIMENTATION RATE] IN BLOOD BY WESTERGREN METHOD: 25 MM/HR (ref 0–20)
FASTING DURATION TIME PATIENT: ABNORMAL H
GLOBULIN SER-MCNC: 4 G/DL (ref 2–4)
GLUCOSE SERPL-MCNC: 92 MG/DL (ref 70–99)
HCT VFR BLD CALC: 40.2 % (ref 36–46.5)
HGB BLD-MCNC: 13.1 G/DL (ref 12–15.5)
IMM GRANULOCYTES # BLD AUTO: 0 K/MCL (ref 0–0.2)
IMM GRANULOCYTES # BLD: 0 %
LYMPHOCYTES # BLD: 4.2 K/MCL (ref 1–4)
LYMPHOCYTES NFR BLD: 43 %
MCH RBC QN AUTO: 30.3 PG (ref 26–34)
MCHC RBC AUTO-ENTMCNC: 32.6 G/DL (ref 32–36.5)
MCV RBC AUTO: 93.1 FL (ref 78–100)
MONOCYTES # BLD: 0.5 K/MCL (ref 0.3–0.9)
MONOCYTES NFR BLD: 5 %
NEUTROPHILS # BLD: 5 K/MCL (ref 1.8–7.7)
NEUTROPHILS NFR BLD: 51 %
NRBC BLD MANUAL-RTO: 0 /100 WBC
PLATELET # BLD AUTO: 280 K/MCL (ref 140–450)
POTASSIUM SERPL-SCNC: 3.8 MMOL/L (ref 3.4–5.1)
PROT SERPL-MCNC: 7.7 G/DL (ref 6.4–8.2)
RBC # BLD: 4.32 MIL/MCL (ref 4–5.2)
SODIUM SERPL-SCNC: 137 MMOL/L (ref 135–145)
TROPONIN I SERPL DL<=0.01 NG/ML-MCNC: <4 NG/L
WBC # BLD: 9.9 K/MCL (ref 4.2–11)

## 2025-07-07 PROCEDURE — 84443 ASSAY THYROID STIM HORMONE: CPT | Performed by: INTERNAL MEDICINE

## 2025-07-07 PROCEDURE — 93010 ELECTROCARDIOGRAM REPORT: CPT | Performed by: INTERNAL MEDICINE

## 2025-07-07 PROCEDURE — 84484 ASSAY OF TROPONIN QUANT: CPT | Performed by: EMERGENCY MEDICINE

## 2025-07-07 PROCEDURE — 99285 EMERGENCY DEPT VISIT HI MDM: CPT | Performed by: EMERGENCY MEDICINE

## 2025-07-07 PROCEDURE — 80053 COMPREHEN METABOLIC PANEL: CPT | Performed by: EMERGENCY MEDICINE

## 2025-07-07 PROCEDURE — 10002807 HB RX 258: Performed by: EMERGENCY MEDICINE

## 2025-07-07 PROCEDURE — 10002800 HB RX 250 W HCPCS: Performed by: EMERGENCY MEDICINE

## 2025-07-07 PROCEDURE — 10002803 HB RX 637: Performed by: EMERGENCY MEDICINE

## 2025-07-07 PROCEDURE — 86140 C-REACTIVE PROTEIN: CPT | Performed by: EMERGENCY MEDICINE

## 2025-07-07 PROCEDURE — 36415 COLL VENOUS BLD VENIPUNCTURE: CPT | Performed by: INTERNAL MEDICINE

## 2025-07-07 PROCEDURE — 71045 X-RAY EXAM CHEST 1 VIEW: CPT

## 2025-07-07 PROCEDURE — 85025 COMPLETE CBC W/AUTO DIFF WBC: CPT | Performed by: EMERGENCY MEDICINE

## 2025-07-07 PROCEDURE — A9150 MISC/EXPER NON-PRESCRIPT DRU: HCPCS | Performed by: EMERGENCY MEDICINE

## 2025-07-07 PROCEDURE — 82550 ASSAY OF CK (CPK): CPT | Performed by: EMERGENCY MEDICINE

## 2025-07-07 PROCEDURE — 93005 ELECTROCARDIOGRAM TRACING: CPT | Performed by: EMERGENCY MEDICINE

## 2025-07-07 PROCEDURE — 85652 RBC SED RATE AUTOMATED: CPT | Performed by: EMERGENCY MEDICINE

## 2025-07-07 RX ORDER — DIAZEPAM 2 MG/1
2 TABLET ORAL ONCE
Status: COMPLETED | OUTPATIENT
Start: 2025-07-07 | End: 2025-07-07

## 2025-07-07 RX ORDER — LIDOCAINE 50 MG/G
1 PATCH TOPICAL
Qty: 10 PATCH | Refills: 0 | Status: SHIPPED | OUTPATIENT
Start: 2025-07-07

## 2025-07-07 RX ORDER — KETOROLAC TROMETHAMINE 15 MG/ML
15 INJECTION, SOLUTION INTRAMUSCULAR; INTRAVENOUS ONCE
Status: COMPLETED | OUTPATIENT
Start: 2025-07-07 | End: 2025-07-07

## 2025-07-07 RX ORDER — ACETAMINOPHEN 325 MG/1
650 TABLET ORAL ONCE
Status: COMPLETED | OUTPATIENT
Start: 2025-07-07 | End: 2025-07-07

## 2025-07-07 RX ORDER — LIDOCAINE 4 G/G
1 PATCH TOPICAL ONCE
Status: DISCONTINUED | OUTPATIENT
Start: 2025-07-07 | End: 2025-07-07 | Stop reason: HOSPADM

## 2025-07-07 RX ADMIN — ACETAMINOPHEN 650 MG: 325 TABLET ORAL at 18:11

## 2025-07-07 RX ADMIN — KETOROLAC TROMETHAMINE 15 MG: 15 INJECTION, SOLUTION INTRAMUSCULAR; INTRAVENOUS at 18:10

## 2025-07-07 RX ADMIN — DIAZEPAM 2 MG: 2 TABLET ORAL at 18:11

## 2025-07-07 RX ADMIN — SODIUM CHLORIDE 1000 ML: 9 INJECTION, SOLUTION INTRAVENOUS at 18:06

## 2025-07-07 RX ADMIN — LIDOCAINE 1 PATCH: 4 PATCH TOPICAL at 18:12

## 2025-07-08 ENCOUNTER — TELEPHONE (OUTPATIENT)
Dept: OBGYN CLINIC | Facility: CLINIC | Age: 60
End: 2025-07-08

## 2025-07-08 DIAGNOSIS — Z12.31 ENCOUNTER FOR SCREENING MAMMOGRAM FOR MALIGNANT NEOPLASM OF BREAST: Primary | ICD-10-CM

## 2025-07-08 LAB
ATRIAL RATE (BPM): 61
P AXIS (DEGREES): 57
PR-INTERVAL (MSEC): 160
QRS-INTERVAL (MSEC): 76
QT-INTERVAL (MSEC): 410
QTC: 413
R AXIS (DEGREES): 20
RAINBOW EXTRA TUBES HOLD SPECIMEN: NORMAL
REPORT TEXT: NORMAL
T AXIS (DEGREES): 31
VENTRICULAR RATE EKG/MIN (BPM): 61

## 2025-07-08 NOTE — TELEPHONE ENCOUNTER
Patient was in the ER for HA  TSH is norml, hence HA are not related to thyroid  Recommend FU with PCP neurology   Hope she feels better soon!Thanks

## 2025-07-08 NOTE — TELEPHONE ENCOUNTER
Per chart review, patient already completed lab today for thyroid that was recommended to be done. Refer to 6/27/25 TE.     Component      Latest Ref Rng 7/7/2025   TSH      0.550 - 4.780 uIU/mL 0.605      Routing to provider to advise on result as pt was complaining of headaches.

## 2025-07-08 NOTE — TELEPHONE ENCOUNTER
Joss calling requesting order for Mammogram. Last Mammogram was done in 2023. She had to reschedule annual today d/t illness. She denies any lumps, pain, skin color changes, dimpling of the skin, nipple issues or discharge.     Order for screening Mammogram placed with instructs on scheduling.

## 2025-07-08 NOTE — TELEPHONE ENCOUNTER
Spoke to pt. Provided recommendations written below by MD, she verbalized understanding. States she is seeing PCP today for follow up.

## 2025-07-08 NOTE — TELEPHONE ENCOUNTER
Patient had to reschedule appointment for today 7/8 due to illness. Rescheduled for 9/11. In the meantime, patient would like an order for mammogram.

## 2025-07-23 ENCOUNTER — LAB ENCOUNTER (OUTPATIENT)
Dept: LAB | Age: 60
End: 2025-07-23
Attending: Other
Payer: MEDICARE

## 2025-07-23 ENCOUNTER — OFFICE VISIT (OUTPATIENT)
Dept: NEUROLOGY | Facility: CLINIC | Age: 60
End: 2025-07-23
Payer: MEDICARE

## 2025-07-23 VITALS
BODY MASS INDEX: 37 KG/M2 | RESPIRATION RATE: 16 BRPM | HEART RATE: 80 BPM | DIASTOLIC BLOOD PRESSURE: 62 MMHG | WEIGHT: 185 LBS | SYSTOLIC BLOOD PRESSURE: 114 MMHG

## 2025-07-23 DIAGNOSIS — R51.9 LEFT TEMPORAL HEADACHE: Primary | ICD-10-CM

## 2025-07-23 DIAGNOSIS — R20.0 NUMBNESS OF LEFT FOOT: ICD-10-CM

## 2025-07-23 DIAGNOSIS — M26.609 TMJ DYSFUNCTION: ICD-10-CM

## 2025-07-23 LAB — ERYTHROCYTE [SEDIMENTATION RATE] IN BLOOD: 26 MM/HR (ref 0–30)

## 2025-07-23 PROCEDURE — 99204 OFFICE O/P NEW MOD 45 MIN: CPT | Performed by: OTHER

## 2025-07-23 PROCEDURE — 85652 RBC SED RATE AUTOMATED: CPT | Performed by: OTHER

## 2025-07-23 PROCEDURE — 36415 COLL VENOUS BLD VENIPUNCTURE: CPT | Performed by: OTHER

## 2025-07-23 NOTE — PATIENT INSTRUCTIONS
Refill policies:     Contact your pharmacy at least 5 days prior to running out of medication and have them send an electronic request or submit request through the “request refill” option in your [facility] account.  Allow 3-5 business days for refills; controlled substances may take longer.  If your prescription is due for a refill, please make sure you have a follow up appointment scheduled with the appropriate prescribing physician.  To best provide you care, patients receiving routine medications need to be seen at least once a year.  Patients receiving narcotic/controlled substance medications need to be seen at least once every 3 months.  Patients receiving narcotic/controlled substance medications will be required to sign an Opioid Treatment Agreement/Contract.  Refills will not be refilled on weekends or holidays; on-call physicians will not refill routine medications.  No narcotics or controlled substances are refilled after noon on Fridays or by on-call physicians.  Federal Law states narcotics must be electronically prescribed.  A 30-day supply with no refills is the maximum allowed by law.  In the event that your preferred pharmacy does not have the requested medication in stock (e.g., Backordered), it is your responsibility to find another pharmacy that has the requested medication available.  We will gladly send a new prescription to that pharmacy at your request.

## 2025-07-23 NOTE — PROGRESS NOTES
HPI:    Patient ID: Joss Carmen is a 59 year old female.    HPI    History of Present Illness  Joss Carmen is a 59 year old female who presents with headache    She has experienced headaches for several years, which have recently worsened. The headaches are described as a pressure sensation primarily on the left side of her head, extending to parietal area and down the side of her face. They have become more intense and are now accompanied by ear pain, initially thought to be an earache. She has been evaluated by an ENT and a dentist, both of whom suggested she may have TMJ. The pain is localized around the jaw and extends into her neck and shoulders, making it difficult for her to chew A CT scan performed approximately a month ago in the ER did not reveal any abnormalities. She has been using sumatriptan for headache relief but discontinued it due to drowsiness. Excedrin, which she previously used, is no longer effective.    She reports numbness in her baby toe, which has persisted for two years. She reports that after her hip replacement surgery 16 months ago, she began wearing supportive footwear due to a collapsed arch. She experiences tingling and numbness in other areas, and a foot doctor suggested possible nerve damage. She has been diagnosed as prediabetic. She experiences swelling in her feet, particularly when wearing a brace for her collapsed arch. She has a history of sciatica, with pain radiating down her leg, and has attended therapy for her hip.        HISTORY:  Past Medical History[1]   Past Surgical History[2]   Family History[3]   Short Social Hx on File[4]     Review of Systems   Constitutional: Negative.    HENT: Negative.     Eyes: Negative.    Respiratory: Negative.     Cardiovascular: Negative.    Gastrointestinal: Negative.    Endocrine: Negative.    Genitourinary: Negative.    Musculoskeletal: Negative.    Skin: Negative.    Allergic/Immunologic: Negative.    Neurological:   Positive for numbness.   Hematological: Negative.    Psychiatric/Behavioral: Negative.     All other systems reviewed and are negative.         Current Medications[5]  Allergies:Allergies[6]  PHYSICAL EXAM:   Physical Exam    Blood pressure 114/62, pulse 80, resp. rate 16, weight 185 lb (83.9 kg), last menstrual period 03/28/2015, not currently breastfeeding.      General Appearance: Well nourished, well developed, no apparent distress.     HEENT: Normocephalic and atraumatic. Normal sclera. Moist mucus membrane  Neck: Normal range of motion. Neck supple.  Cardiovascular: Normal rate, regular rhythm and normal heart sounds.    Pulmonary/Chest: Effort normal and breath sounds normal.   Abdominal: Soft. Bowel sounds are normal.   Skin: dry, clean and intact  Ext: + ankle swelling left>right  Psych: normal mood and affect    Neurological:  Patient is awake, alert and oriented to person, place and time   Normal memory, attention/concentration, speech and language.    Cranial Nerves: II: Visual acuity: normal  II: Visual fields: normal  III: Pupils: equal, round, reactive to light  III,IV,VI: Extra Ocular Movements: intact  V: Facial sensation: intact  VII: Facial strength: intact  VIII: Hearing: intact  IX: Palate: intact  XI: Shoulder shrug: intact  XII: Tongue movement: normal    Motor: Normal tone. Strength is  5 out of 5 in all extremities bilaterally.  DTR: present 2+ patellar and 1+ achilles bilaterally    Sensory: Sensory examination is normal to light touch and pinprick     Coordination: Finger-to-nose test  normal bilaterally without evidence of dysmetria.    Gait: normal casual gait       TESTS/IMAGING:   CT head  FINDINGS:    VENTRICLES/SULCI: Ventricles and sulci are normal in size.  INTRACRANIAL: There are no abnormal extraaxial fluid collections.  There is no midline shift.  There are no intraparenchymal brain abnormalities.  There is nothing specific for acute infarct.  There is no hemorrhage or mass  lesion.  SINUSES: No sign of acute sinusitis.  MASTOIDS: No sign of acute inflammation.  SKULL: No evidence for fracture or osseous abnormality.  OTHER: None.    Impression   CONCLUSION: No significant midline shift or mass effect. No acute intracranial hemorrhage. If there is persistent clinical concern, then consider MRI.         ASSESSMENT/PLAN:       ICD-10-CM    1. Left temporal headache  R51.9 Sed RateLauraren (Automated) [E]      2. TMJ dysfunction  M26.609       3. Numbness of left foot  R20.0         Assessment & Plan  Headache  Left sided headache and jaw pain suspecting TMJ-related referred pain.  No typical migraine. CT Head negative    - Check ESR to rule out arterial inflammation.  - Refer to TMJ specialist for further evaluation and management.  - Consider therapy and mouth guard for TMJ management.    Left 5 th toe tingling numbness, intermittent sciatic pain  EMG/NCS LLE to assess for any radiculopathy vs neuropathy    Follow up after the test is completed         Erica Arce MD   [facility]      This note was prepared using Dragon Medical voice recognition dictation software. As a result errors may occur. When identified these errors have been corrected. While every attempt is made to correct errors during dictation discrepancies may still exist         Orders Placed This Encounter   Procedures    Sed Rate, Jeremy (Automated) [E]       Thank you for allowing us to participate in your patient's care.      Please do not hesitate to call if you have any questions.   I will continue to follow with you and will make further recommendations based on her progress.     30 total minutes spent with patient >50% of visit was spent in counseling and coordination of care      Erica Arce MD  [facility]  Diplomate American Board of General &Vascular Neurology    Meds This Visit:  Requested Prescriptions      No prescriptions requested or ordered in this  encounter       Imaging & Referrals:  None     ID#[id number]         [1]   Past Medical History:   Abdominal distention    Abdominal pain    Abnormal uterine bleeding    Amenorrhea    Anxiety state, unspecified    Arthritis    Back pain    Belching    Bloating    Blurred vision    Chest pain on exertion    Chronic cough    Constipation    Diabetes mellitus (HCC)    pre diabetic    Dyspareunia    Easy bruising    Essential hypertension    Fatigue    Flatulence/gas pain/belching    Frequent urination    H/O mammogram    benign pt stated    H/O mammogram    normal    Heartburn    High cholesterol    Hoarseness, chronic    Indigestion    Irregular bowel habits    Leaking of urine    Leg swelling    Loss of appetite    Nausea    Obstructive sleep apnea (adult) (pediatric)    AHI 5.3 SaO2 cole 81 % autoPAP 6-16 HME    Pain in joints    Pain with bowel movements    Pap smear for cervical cancer screening    wnl pt stated    Pap smear for cervical cancer screening    wnl, neg hpv    Pulmonary embolism (HCC)    Sleep disturbance    Stress    Uncomfortable fullness after meals    Wears glasses    Weight gain    Wheezing   [2]   Past Surgical History:  Procedure Laterality Date      ,     Cholecystectomy      Hip replacement surgery      Lap adjustable gastric band  2004    Had removed in     Oophorectomy  2000    Other surgical history  2015    benign    Other surgical history Right     R hip replacement    Removal gallbladder      Tubal ligation     [3]   Family History  Problem Relation Age of Onset    Stroke Father     Heart Disorder Father         CHF    Diabetes Father     Hypertension Father     Heart Attack Father     Hypertension Mother     Colon Polyps Mother     Lung Disorder Maternal Grandfather     Diabetes Brother     Cancer Maternal Aunt 40        ovarian   [4]   Social History  Socioeconomic History    Marital status:    Tobacco Use    Smoking status: Never    Smokeless  tobacco: Never   Vaping Use    Vaping status: Never Used   Substance and Sexual Activity    Alcohol use: Not Currently    Drug use: Never    Sexual activity: Not Currently     Partners: Male   Other Topics Concern    Caffeine Concern Yes     Comment: tea occ    Stress Concern No    Weight Concern Yes    Special Diet No    Exercise Yes     Comment: trys to walk on treadmill, biking    Seat Belt Yes     Social Drivers of Health     Food Insecurity: No Food Insecurity (12/30/2024)    Food Insecurity     Food Insecurity: Never true   Transportation Needs: No Transportation Needs (12/30/2024)    Transportation Needs     Lack of Transportation: No   Housing Stability: Low Risk  (12/30/2024)    Housing Stability     Housing Instability: No   [5]   Current Outpatient Medications   Medication Sig Dispense Refill    fexofenadine 180 MG Oral Tab Take 1 tablet (180 mg total) by mouth daily. 90 tablet 1    Accu-Chek Softclix Lancets Does not apply Misc 1 Lancet by Finger stick route in the morning and 1 Lancet before bedtime. 200 each 1    ACCU-CHEK GUIDE TEST In Vitro Strip 1 each by In Vitro route in the morning and 1 each before bedtime. 200 each 1    Blood Glucose Monitoring Suppl (ACCU-CHEK GUIDE) w/Device Does not apply Kit USE TO TEST BLOOD SUGAR      Cholecalciferol (VITAMIN D3 OR) Take by mouth.      Ascorbic Acid (VITAMIN C OR) Take by mouth.      ZINC OR Use as directed in the mouth or throat.      Esomeprazole Magnesium 40 MG Oral Capsule Delayed Release       Meloxicam 15 MG Oral Tab       traMADol 50 MG Oral Tab Take 1 tablet (50 mg total) by mouth every 12 (twelve) hours as needed for Pain.      hyoscyamine sulfate 0.125 MG Oral Tablet Dispersible Take 1 tablet (0.125 mg total) by mouth every 4 (four) hours as needed.      SUMAtriptan Succinate 100 MG Oral Tab  (Patient not taking: Reported on 7/23/2025)      ClonazePAM 1 MG Oral Tab Take 1 tablet (1 mg total) by mouth nightly as needed for Anxiety. (Patient not  taking: Reported on 7/23/2025)     [6]   Allergies  Allergen Reactions    Morphine ANAPHYLAXIS, OTHER (SEE COMMENTS) and SHORTNESS OF BREATH     Derivatives SOB    Following a surgical procedure.   Pt. States felt SOB.  Derivatives SOB  Pt. States felt SOB.      Metformin OTHER (SEE COMMENTS)     weakness

## 2025-07-23 NOTE — PROGRESS NOTES
{Vanishing suggested script  \"DrLian [X] would like to utilize a tool that securely records the visit conversation to help write his/her medical notes so he/she can pay closer attention to you and less time on the computer\".:[id number]}     The following individual(s) verbally consented to be recorded using ambient AI listening technology and understand that they can each withdraw their consent to this listening technology at any point by asking the clinician to turn off or pause the recording:    Patient name: Fangneyda Stephengers  Additional names:

## 2025-07-24 ENCOUNTER — TELEPHONE (OUTPATIENT)
Dept: NEUROLOGY | Facility: CLINIC | Age: 60
End: 2025-07-24

## 2025-07-28 ENCOUNTER — RESULTS FOLLOW-UP (OUTPATIENT)
Dept: NEUROLOGY | Facility: CLINIC | Age: 60
End: 2025-07-28

## 2025-07-29 ENCOUNTER — APPOINTMENT (OUTPATIENT)
Dept: GENERAL RADIOLOGY | Facility: HOSPITAL | Age: 60
End: 2025-07-29

## 2025-07-29 ENCOUNTER — HOSPITAL ENCOUNTER (EMERGENCY)
Facility: HOSPITAL | Age: 60
Discharge: HOME | End: 2025-07-30
Attending: EMERGENCY MEDICINE

## 2025-07-29 ENCOUNTER — APPOINTMENT (OUTPATIENT)
Dept: CT IMAGING | Facility: HOSPITAL | Age: 60
End: 2025-07-29
Attending: EMERGENCY MEDICINE

## 2025-07-29 DIAGNOSIS — R51.9 NONINTRACTABLE HEADACHE, UNSPECIFIED CHRONICITY PATTERN, UNSPECIFIED HEADACHE TYPE: Primary | ICD-10-CM

## 2025-07-29 LAB
ALBUMIN SERPL-MCNC: 4.6 G/DL (ref 3.2–4.8)
ALBUMIN/GLOB SERPL: 1.4 (ref 1–2)
ALP LIVER SERPL-CCNC: 67 U/L (ref 46–118)
ALT SERPL-CCNC: 15 U/L (ref 10–49)
ANION GAP SERPL CALC-SCNC: 9 MMOL/L (ref 0–18)
AST SERPL-CCNC: 19 U/L (ref ?–34)
BASOPHILS # BLD AUTO: 0.03 X10(3) UL (ref 0–0.2)
BASOPHILS NFR BLD AUTO: 0.3 %
BILIRUB SERPL-MCNC: 0.3 MG/DL (ref 0.3–1.2)
BUN BLD-MCNC: 10 MG/DL (ref 9–23)
CALCIUM BLD-MCNC: 9.8 MG/DL (ref 8.7–10.6)
CHLORIDE SERPL-SCNC: 102 MMOL/L (ref 98–112)
CO2 SERPL-SCNC: 30 MMOL/L (ref 21–32)
CREAT BLD-MCNC: 0.72 MG/DL (ref 0.55–1.02)
EGFRCR SERPLBLD CKD-EPI 2021: 96 ML/MIN/1.73M2 (ref 60–?)
EOSINOPHIL # BLD AUTO: 0.02 X10(3) UL (ref 0–0.7)
EOSINOPHIL NFR BLD AUTO: 0.2 %
ERYTHROCYTE [DISTWIDTH] IN BLOOD BY AUTOMATED COUNT: 12.7 %
GLOBULIN PLAS-MCNC: 3.2 G/DL (ref 2–3.5)
GLUCOSE BLD-MCNC: 106 MG/DL (ref 70–99)
HCT VFR BLD AUTO: 36.5 % (ref 35–48)
HGB BLD-MCNC: 12.5 G/DL (ref 12–16)
IMM GRANULOCYTES # BLD AUTO: 0.03 X10(3) UL (ref 0–1)
IMM GRANULOCYTES NFR BLD: 0.3 %
LYMPHOCYTES # BLD AUTO: 3.36 X10(3) UL (ref 1–4)
LYMPHOCYTES NFR BLD AUTO: 35.3 %
MCH RBC QN AUTO: 30.9 PG (ref 26–34)
MCHC RBC AUTO-ENTMCNC: 34.2 G/DL (ref 31–37)
MCV RBC AUTO: 90.1 FL (ref 80–100)
MONOCYTES # BLD AUTO: 0.39 X10(3) UL (ref 0.1–1)
MONOCYTES NFR BLD AUTO: 4.1 %
NEUTROPHILS # BLD AUTO: 5.69 X10 (3) UL (ref 1.5–7.7)
NEUTROPHILS # BLD AUTO: 5.69 X10(3) UL (ref 1.5–7.7)
NEUTROPHILS NFR BLD AUTO: 59.8 %
OSMOLALITY SERPL CALC.SUM OF ELEC: 291 MOSM/KG (ref 275–295)
PLATELET # BLD AUTO: 282 10(3)UL (ref 150–450)
POTASSIUM SERPL-SCNC: 4.1 MMOL/L (ref 3.5–5.1)
PROT SERPL-MCNC: 7.8 G/DL (ref 5.7–8.2)
RBC # BLD AUTO: 4.05 X10(6)UL (ref 3.8–5.3)
SODIUM SERPL-SCNC: 141 MMOL/L (ref 136–145)
TROPONIN I SERPL HS-MCNC: <3 NG/L (ref ?–34)
WBC # BLD AUTO: 9.5 X10(3) UL (ref 4–11)

## 2025-07-29 PROCEDURE — 36415 COLL VENOUS BLD VENIPUNCTURE: CPT

## 2025-07-29 PROCEDURE — 99285 EMERGENCY DEPT VISIT HI MDM: CPT

## 2025-07-29 PROCEDURE — 93005 ELECTROCARDIOGRAM TRACING: CPT

## 2025-07-29 PROCEDURE — 93010 ELECTROCARDIOGRAM REPORT: CPT

## 2025-07-29 PROCEDURE — 80053 COMPREHEN METABOLIC PANEL: CPT | Performed by: EMERGENCY MEDICINE

## 2025-07-29 PROCEDURE — 71045 X-RAY EXAM CHEST 1 VIEW: CPT

## 2025-07-29 PROCEDURE — 85025 COMPLETE CBC W/AUTO DIFF WBC: CPT | Performed by: EMERGENCY MEDICINE

## 2025-07-29 PROCEDURE — 96372 THER/PROPH/DIAG INJ SC/IM: CPT

## 2025-07-29 PROCEDURE — 70450 CT HEAD/BRAIN W/O DYE: CPT | Performed by: EMERGENCY MEDICINE

## 2025-07-29 PROCEDURE — 99284 EMERGENCY DEPT VISIT MOD MDM: CPT

## 2025-07-29 PROCEDURE — 80053 COMPREHEN METABOLIC PANEL: CPT

## 2025-07-29 PROCEDURE — 84484 ASSAY OF TROPONIN QUANT: CPT | Performed by: EMERGENCY MEDICINE

## 2025-07-29 PROCEDURE — 84484 ASSAY OF TROPONIN QUANT: CPT

## 2025-07-29 PROCEDURE — 85025 COMPLETE CBC W/AUTO DIFF WBC: CPT

## 2025-07-29 RX ORDER — KETOROLAC TROMETHAMINE 15 MG/ML
15 INJECTION, SOLUTION INTRAMUSCULAR; INTRAVENOUS ONCE
Status: COMPLETED | OUTPATIENT
Start: 2025-07-29 | End: 2025-07-29

## 2025-07-29 RX ORDER — KETOROLAC TROMETHAMINE 15 MG/ML
15 INJECTION, SOLUTION INTRAMUSCULAR; INTRAVENOUS ONCE
Status: DISCONTINUED | OUTPATIENT
Start: 2025-07-29 | End: 2025-07-29

## 2025-07-30 VITALS
HEIGHT: 59 IN | HEART RATE: 79 BPM | SYSTOLIC BLOOD PRESSURE: 164 MMHG | TEMPERATURE: 98 F | BODY MASS INDEX: 37.29 KG/M2 | OXYGEN SATURATION: 96 % | DIASTOLIC BLOOD PRESSURE: 90 MMHG | WEIGHT: 185 LBS | RESPIRATION RATE: 23 BRPM

## 2025-07-30 LAB
ATRIAL RATE: 90 BPM
P AXIS: 59 DEGREES
P-R INTERVAL: 146 MS
Q-T INTERVAL: 348 MS
QRS DURATION: 82 MS
QTC CALCULATION (BEZET): 425 MS
R AXIS: 32 DEGREES
T AXIS: 36 DEGREES
VENTRICULAR RATE: 90 BPM

## 2025-07-30 RX ORDER — IBUPROFEN 600 MG/1
600 TABLET, FILM COATED ORAL EVERY 6 HOURS PRN
Qty: 10 TABLET | Refills: 0 | Status: SHIPPED | OUTPATIENT
Start: 2025-07-30 | End: 2025-08-02

## 2025-08-01 ENCOUNTER — TELEPHONE (OUTPATIENT)
Dept: NEUROLOGY | Facility: CLINIC | Age: 60
End: 2025-08-01

## 2025-08-01 ENCOUNTER — TELEPHONE (OUTPATIENT)
Dept: OBGYN CLINIC | Facility: CLINIC | Age: 60
End: 2025-08-01

## 2025-08-25 ENCOUNTER — ORDER TRANSCRIPTION (OUTPATIENT)
Dept: ADMINISTRATIVE | Facility: HOSPITAL | Age: 60
End: 2025-08-25

## 2025-08-25 DIAGNOSIS — R20.0 NUMBNESS AND TINGLING: Primary | ICD-10-CM

## 2025-08-25 DIAGNOSIS — R20.2 TINGLING: ICD-10-CM

## 2025-08-25 DIAGNOSIS — R20.0 NUMBNESS: Primary | ICD-10-CM

## 2025-08-25 DIAGNOSIS — R20.2 NUMBNESS AND TINGLING: Primary | ICD-10-CM

## 2025-08-28 ENCOUNTER — APPOINTMENT (OUTPATIENT)
Dept: RHEUMATOLOGY | Age: 60
End: 2025-08-28

## 2025-09-03 ENCOUNTER — OFFICE VISIT (OUTPATIENT)
Dept: OTOLARYNGOLOGY | Age: 60
End: 2025-09-03

## 2025-09-03 DIAGNOSIS — R49.0 DYSPHONIA: Primary | ICD-10-CM

## 2025-11-03 ENCOUNTER — APPOINTMENT (OUTPATIENT)
Dept: OPTOMETRY | Age: 60
End: 2025-11-03

## (undated) NOTE — MR AVS SNAPSHOT
[address] [phone]               Thank you for choosing us for your health care visit with Patsy Hoffman MD.  We are glad to serve you and happy to provide you with this summary of Take 1 tablet (500 mg total) by mouth daily with breakfast.   Commonly known as:  GLUCOPHAGE-XR           topiramate 25 MG Tabs   Take 1 tablet (25 mg total) by mouth 2 (two) times daily.    Commonly known as:  TOPAMAX           TraMADol HCl 50 MG Tabs   Co active are less likely to develop some chronic diseases than adults who are inactive.      HOW TO GET STARTED: HOW TO STAY MOTIVATED:   Start activities slowly and build up over time Do what you like   Get your heart pumping – brisk walking, biking, swimmin

## (undated) NOTE — Clinical Note
Marilee - I saw Joss today with atrophy & pelvic pain. I've recommended bowel mgmt, vag estrogen, & PFPT. I will work to manage her pelvic floor sx. I appreciate the opportunity to participate in her care. Thanks, Kimberly

## (undated) NOTE — MR AVS SNAPSHOT
[address] [phone]               Thank you for choosing us for your health care visit with Jadyn Perez MD.  We are glad to serve you and happy to provide you with this summary of Current Medications          This list is accurate as of: 6/20/17  4:21 PM.  Always use your most recent med list.                JASS ASPIRIN EC LOW DOSE 81 MG Tbec   Generic drug:  aspirin   Take by mouth.            ClonazePAM 1 MG Tabs   Take 1 mg b your doctor or other care provider to review them with you.          Where to Get Your Medications      These medications were sent to [address], [address] [place] [address] AT [address] [place] [address] Drive, [phone], [phone]

## (undated) NOTE — ED AVS SNAPSHOT
Bassem Palomo   MRN: [id number]    Department:  [facility] Emergency Department in [place]   Date of Visit:  9/28/2018           Disclosure     Insurance plans vary and the physician(s) referred by the ER may not be covered by your plan.  Please contac tell this physician (or your personal doctor if your instructions are to return to your personal doctor) about any new or lasting problems. The primary care or specialist physician will see patients referred from the [facility] Emergency Department.  Yvonne Valdez

## (undated) NOTE — MR AVS SNAPSHOT
[address] [phone]               Thank you for choosing us for your health care visit with Luis Aj MD.  We are glad to serve you and happy to provide you with this summary of This list is accurate as of: 2/23/17  3:49 PM.  Always use your most recent med list.                JASS ASPIRIN EC LOW DOSE 81 MG Tbec   Generic drug:  aspirin   Take by mouth.            ClonazePAM 1 MG Tabs   Take 1 mg by mouth 2 (two) times daily as n Call the [employer] for assistance with your inactive [employer] account    If you have questions, you can call [phone] to talk to our [facility] Staff. Remember, [employer] is NOT to be used for urgent needs. For medical emergencies, dial 911.     Vi

## (undated) NOTE — MR AVS SNAPSHOT
[address]  [phone]               Thank you for choosing us for your health care visit with Bobby Chaves.  Marisol Clinton MD.  We are glad to serve you and happy to provide you with this summar Take 1 mg by mouth 2 (two) times daily as needed for Anxiety.    Commonly known as:  KLONOPIN           * ClonazePAM 2 MG Tabs   TK 1 T PO QD   Commonly known as:  Eual Port 2-ANGELIA 0.3 MG/0.3ML Soaj   Generic drug:  EPINEPHrine           ergo AUDIOLOGY - INTERNAL    Complete by:  As directed    Assoc Dx:  Ringing in the ear, left [H93.12]                 Referral Details     Referred By    Referred To    Daniele Vivar MD   [address] [address]   Phone:  [phone]

## (undated) NOTE — MR AVS SNAPSHOT
[address] [phone]               Thank you for choosing us for your health care visit with Vanesa Moore MD.  We are glad to serve you and happy to provide you with this summary of Instructions and Information about Your Health     None      Allergies as of Apr 25, 2017     Aspirin Tinitus    Morphine     Derivatives SOB                  Today's Vital Signs     BP Pulse Height Weight BMI    124/80 mmHg 84 58\" 205 lb 42.86 kg/m2 Hours:  24-hours Phone:  [phone]    - Naltrexone-Bupropion HCl ER 8-90 MG Tb12            [employer]     Call the [employer] for assistance with your inactive [employer] account    If you have questions, you can call [phone] to talk to our [employer]

## (undated) NOTE — LETTER
AUTHORIZATION FOR SURGICAL OPERATION OR OTHER PROCEDURE    1. I hereby authorize Dr. Gayle Eric and [address] [place] [address] staff assigned to my case to perform the following operation and/or procedure at the [address] [place] [address]:    [address]   ( LEEP )      _______________________________________________________________________________________________    2. My physician has explained the nature and purpose of the operation or other procedure, possible alternative methods of treatment, the risks involved, and the possibility of complication to me. I acknowledge that no guarantee has been made as to the result that may be obtained. 3.  I recognize that, during the course of this operation, or other procedure, unforseen conditions may necessitate additional or different procedure than those listed above. I, therefore, further authorize and request that the above named physician, his/her physician assistants or designees perform such procedures as are, in his/her professional opinion, necessary and desirable. 4.  Any tissue or organs removed in the operation or other procedure may be disposed of by and at the discretion of the [address] [place] [address] and [facility] AT [facility]. 5.  I understand that in the event of a medical emergency, I will be transported by local paramedics to [facility] or other hospital emergency department. 6.  I certify that I have read and fully understand the above consent to operation and/or other procedure. 7.  I acknowledge that my physician has explained sedation/analgesia administration to me including the risks and benefits. I consent to the administration of sedation/analgesia as may be necessary or desirable in the judgement of my physician. Witness signature: ___________________________________________________ Date:  ______/______/_____                    Time:  ________ A. ISAURA  PCHRISTEL        Patient Name: ______________________________________________________  (please print)      Patient signature:  ___________________________________________________             Relationship to Patient:           []  Parent    Responsible person                          []  Spouse  In case of minor or                    [] Other  _____________   Incompetent name:  __________________________________________________                               (please print)      _____________      Responsible person  In case of minor or  Incompetent signature:  _______________________________________________    Statement of Physician  My signature below affirms that prior to the time of the procedure, I have explained to the patient and/or his/her guardian, the risks and benefits involved in the proposed treatment and any reasonable alternative to the proposed treatment. I have also explained the risks and benefits involved in the refusal of the proposed treatment and have answered the patient's questions.                         Date:  ______/______/_______  Provider                      Signature:  __________________________________________________________       Time:  ___________ A.M    P.M.

## (undated) NOTE — MR AVS SNAPSHOT
Diane  [address]  [phone]               Thank you for choosing us [facility].   We are glad to serve you and happy to provide you with this summar Generic drug:  EPINEPHrine           ergocalciferol 38251 units Caps   Take 1 capsule (50,000 Units total) by mouth once a week.  For 6 months   Commonly known as:  DRISDOL/VITAMIN D2           Fluticasone Propionate 50 MCG/ACT Susp   1 spray by Nasal route Visit [email or web address] to learn more.            Visit [facility] online at  [email or web address]

## (undated) NOTE — LETTER
[address] [place] [address]  Authorization for Invasive Procedures  1.  I hereby authorize Dr. Page Koroma , my physician and whomever may be designated as the doctor's assistant, to perform the following operation and/or procedure:  Ofilia Cooks 5. I consent to the photographing of the operations or procedures to be performed for the purposes of advancing medicine, science, and/or education, provided my identity is not revealed.  If the procedure has been videotaped, the physician/surgeon will obta __________ Time: ___________    Statement of Physician  My signature below affirms that prior to the time of the procedure, I have explained to the patient and/or her legal representative, the risks and benefits involved in the proposed treatment and any r

## (undated) NOTE — MR AVS SNAPSHOT
After Visit Summary   1/26/2017    Maxine Borges    MRN: [id number]           Visit Information        Provider Department Dept Phone    1/26/2017 10:00 PM Bed1  [facility] [phone]      Allergies as of 1/26/2017  Reviewed on: 1/19/2017 not need to use this code after you have completed the sign-up process. If you do not sign up before the expiration date, you must request a new code.       [employer] Activation Code: [email or web address]  Expires: 3/28/2017  8:35 AM      Enter your Zip Code and Date